# Patient Record
Sex: FEMALE | Race: WHITE | Employment: FULL TIME | URBAN - METROPOLITAN AREA
[De-identification: names, ages, dates, MRNs, and addresses within clinical notes are randomized per-mention and may not be internally consistent; named-entity substitution may affect disease eponyms.]

---

## 2021-08-12 ENCOUNTER — HOSPITAL ENCOUNTER (INPATIENT)
Age: 61
LOS: 9 days | DRG: 870 | End: 2021-08-25
Attending: EMERGENCY MEDICINE | Admitting: INTERNAL MEDICINE
Payer: COMMERCIAL

## 2021-08-12 DIAGNOSIS — R74.01 ELEVATED TRANSAMINASE LEVEL: ICD-10-CM

## 2021-08-12 DIAGNOSIS — R41.82 ALTERED MENTAL STATUS, UNSPECIFIED ALTERED MENTAL STATUS TYPE: ICD-10-CM

## 2021-08-12 DIAGNOSIS — U07.1 2019 NOVEL CORONAVIRUS-INFECTED PNEUMONIA (NCIP): Primary | ICD-10-CM

## 2021-08-12 DIAGNOSIS — R82.4 KETONURIA: ICD-10-CM

## 2021-08-12 DIAGNOSIS — R10.12 LEFT UPPER QUADRANT ABDOMINAL PAIN: ICD-10-CM

## 2021-08-12 DIAGNOSIS — R00.0 TACHYCARDIA: ICD-10-CM

## 2021-08-12 DIAGNOSIS — J12.82 2019 NOVEL CORONAVIRUS-INFECTED PNEUMONIA (NCIP): Primary | ICD-10-CM

## 2021-08-12 DIAGNOSIS — R11.2 NAUSEA AND VOMITING, INTRACTABILITY OF VOMITING NOT SPECIFIED, UNSPECIFIED VOMITING TYPE: ICD-10-CM

## 2021-08-12 DIAGNOSIS — E86.0 DEHYDRATION: ICD-10-CM

## 2021-08-12 DIAGNOSIS — K76.0 HEPATIC STEATOSIS: ICD-10-CM

## 2021-08-12 LAB
A/G RATIO: 1.2 (ref 1.1–2.2)
ALBUMIN SERPL-MCNC: 4.2 G/DL (ref 3.4–5)
ALP BLD-CCNC: 98 U/L (ref 40–129)
ALT SERPL-CCNC: 73 U/L (ref 10–40)
AMORPHOUS: ABNORMAL /HPF
ANION GAP SERPL CALCULATED.3IONS-SCNC: 16 MMOL/L (ref 3–16)
AST SERPL-CCNC: 90 U/L (ref 15–37)
BACTERIA: ABNORMAL /HPF
BASOPHILS ABSOLUTE: 0 K/UL (ref 0–0.2)
BASOPHILS RELATIVE PERCENT: 0.7 %
BILIRUB SERPL-MCNC: 0.3 MG/DL (ref 0–1)
BILIRUBIN URINE: ABNORMAL
BLOOD, URINE: ABNORMAL
BUN BLDV-MCNC: 12 MG/DL (ref 7–20)
CALCIUM SERPL-MCNC: 8.5 MG/DL (ref 8.3–10.6)
CHLORIDE BLD-SCNC: 98 MMOL/L (ref 99–110)
CLARITY: ABNORMAL
CO2: 21 MMOL/L (ref 21–32)
COARSE CASTS, UA: ABNORMAL /LPF (ref 0–2)
COLOR: YELLOW
CREAT SERPL-MCNC: 1.1 MG/DL (ref 0.6–1.2)
EOSINOPHILS ABSOLUTE: 0 K/UL (ref 0–0.6)
EOSINOPHILS RELATIVE PERCENT: 0 %
EPITHELIAL CELLS, UA: ABNORMAL /HPF (ref 0–5)
FINE CASTS, UA: ABNORMAL /LPF (ref 0–2)
GFR AFRICAN AMERICAN: >60
GFR NON-AFRICAN AMERICAN: 51
GLOBULIN: 3.6 G/DL
GLUCOSE BLD-MCNC: 124 MG/DL (ref 70–99)
GLUCOSE URINE: NEGATIVE MG/DL
HCT VFR BLD CALC: 41.4 % (ref 36–48)
HEMOGLOBIN: 14.2 G/DL (ref 12–16)
KETONES, URINE: 40 MG/DL
LEUKOCYTE ESTERASE, URINE: NEGATIVE
LYMPHOCYTES ABSOLUTE: 0.6 K/UL (ref 1–5.1)
LYMPHOCYTES RELATIVE PERCENT: 24.5 %
MCH RBC QN AUTO: 31.6 PG (ref 26–34)
MCHC RBC AUTO-ENTMCNC: 34.3 G/DL (ref 31–36)
MCV RBC AUTO: 92.1 FL (ref 80–100)
MICROSCOPIC EXAMINATION: YES
MONOCYTES ABSOLUTE: 0.2 K/UL (ref 0–1.3)
MONOCYTES RELATIVE PERCENT: 7.1 %
NEUTROPHILS ABSOLUTE: 1.6 K/UL (ref 1.7–7.7)
NEUTROPHILS RELATIVE PERCENT: 67.7 %
NITRITE, URINE: NEGATIVE
PDW BLD-RTO: 13.6 % (ref 12.4–15.4)
PH UA: 5 (ref 5–8)
PLATELET # BLD: 157 K/UL (ref 135–450)
PMV BLD AUTO: 8.1 FL (ref 5–10.5)
POTASSIUM SERPL-SCNC: 3.8 MMOL/L (ref 3.5–5.1)
PROTEIN UA: 100 MG/DL
RBC # BLD: 4.5 M/UL (ref 4–5.2)
RBC UA: ABNORMAL /HPF (ref 0–4)
SODIUM BLD-SCNC: 135 MMOL/L (ref 136–145)
SPECIFIC GRAVITY UA: >=1.03 (ref 1–1.03)
TOTAL PROTEIN: 7.8 G/DL (ref 6.4–8.2)
URINE REFLEX TO CULTURE: YES
URINE TYPE: ABNORMAL
UROBILINOGEN, URINE: 0.2 E.U./DL
WBC # BLD: 2.4 K/UL (ref 4–11)
WBC UA: ABNORMAL /HPF (ref 0–5)

## 2021-08-12 PROCEDURE — 96361 HYDRATE IV INFUSION ADD-ON: CPT

## 2021-08-12 PROCEDURE — 80053 COMPREHEN METABOLIC PANEL: CPT

## 2021-08-12 PROCEDURE — 85025 COMPLETE CBC W/AUTO DIFF WBC: CPT

## 2021-08-12 PROCEDURE — 96374 THER/PROPH/DIAG INJ IV PUSH: CPT

## 2021-08-12 PROCEDURE — 87086 URINE CULTURE/COLONY COUNT: CPT

## 2021-08-12 PROCEDURE — 81001 URINALYSIS AUTO W/SCOPE: CPT

## 2021-08-12 PROCEDURE — 99284 EMERGENCY DEPT VISIT MOD MDM: CPT

## 2021-08-12 ASSESSMENT — PAIN DESCRIPTION - ONSET: ONSET: ON-GOING

## 2021-08-12 ASSESSMENT — PAIN DESCRIPTION - LOCATION: LOCATION: GENERALIZED

## 2021-08-12 ASSESSMENT — PAIN DESCRIPTION - DESCRIPTORS: DESCRIPTORS: ACHING

## 2021-08-12 ASSESSMENT — PAIN SCALES - GENERAL: PAINLEVEL_OUTOF10: 6

## 2021-08-12 ASSESSMENT — PAIN DESCRIPTION - FREQUENCY: FREQUENCY: INTERMITTENT

## 2021-08-12 ASSESSMENT — PAIN DESCRIPTION - PAIN TYPE: TYPE: ACUTE PAIN

## 2021-08-13 ENCOUNTER — APPOINTMENT (OUTPATIENT)
Dept: GENERAL RADIOLOGY | Age: 61
DRG: 870 | End: 2021-08-13

## 2021-08-13 ENCOUNTER — APPOINTMENT (OUTPATIENT)
Dept: CT IMAGING | Age: 61
DRG: 870 | End: 2021-08-13

## 2021-08-13 PROBLEM — U07.1 COVID-19: Status: ACTIVE | Noted: 2021-08-13

## 2021-08-13 LAB
EKG ATRIAL RATE: 119 BPM
EKG DIAGNOSIS: NORMAL
EKG P AXIS: 50 DEGREES
EKG P-R INTERVAL: 156 MS
EKG Q-T INTERVAL: 318 MS
EKG QRS DURATION: 62 MS
EKG QTC CALCULATION (BAZETT): 447 MS
EKG R AXIS: 6 DEGREES
EKG T AXIS: 30 DEGREES
EKG VENTRICULAR RATE: 119 BPM
LACTIC ACID, SEPSIS: 1.6 MMOL/L (ref 0.4–1.9)
SARS-COV-2, NAAT: DETECTED
TROPONIN: <0.01 NG/ML
URINE CULTURE, ROUTINE: NORMAL

## 2021-08-13 PROCEDURE — 96376 TX/PRO/DX INJ SAME DRUG ADON: CPT

## 2021-08-13 PROCEDURE — 96372 THER/PROPH/DIAG INJ SC/IM: CPT

## 2021-08-13 PROCEDURE — 99222 1ST HOSP IP/OBS MODERATE 55: CPT | Performed by: INTERNAL MEDICINE

## 2021-08-13 PROCEDURE — 2580000003 HC RX 258: Performed by: INTERNAL MEDICINE

## 2021-08-13 PROCEDURE — 87449 NOS EACH ORGANISM AG IA: CPT

## 2021-08-13 PROCEDURE — 96375 TX/PRO/DX INJ NEW DRUG ADDON: CPT

## 2021-08-13 PROCEDURE — 94761 N-INVAS EAR/PLS OXIMETRY MLT: CPT

## 2021-08-13 PROCEDURE — 6360000002 HC RX W HCPCS: Performed by: NURSE PRACTITIONER

## 2021-08-13 PROCEDURE — 2700000000 HC OXYGEN THERAPY PER DAY

## 2021-08-13 PROCEDURE — 96361 HYDRATE IV INFUSION ADD-ON: CPT

## 2021-08-13 PROCEDURE — 93010 ELECTROCARDIOGRAM REPORT: CPT | Performed by: INTERNAL MEDICINE

## 2021-08-13 PROCEDURE — 93005 ELECTROCARDIOGRAM TRACING: CPT | Performed by: NURSE PRACTITIONER

## 2021-08-13 PROCEDURE — 70450 CT HEAD/BRAIN W/O DYE: CPT

## 2021-08-13 PROCEDURE — 6370000000 HC RX 637 (ALT 250 FOR IP): Performed by: INTERNAL MEDICINE

## 2021-08-13 PROCEDURE — 87635 SARS-COV-2 COVID-19 AMP PRB: CPT

## 2021-08-13 PROCEDURE — G0378 HOSPITAL OBSERVATION PER HR: HCPCS

## 2021-08-13 PROCEDURE — 6360000002 HC RX W HCPCS: Performed by: INTERNAL MEDICINE

## 2021-08-13 PROCEDURE — 74177 CT ABD & PELVIS W/CONTRAST: CPT

## 2021-08-13 PROCEDURE — 2580000003 HC RX 258: Performed by: NURSE PRACTITIONER

## 2021-08-13 PROCEDURE — 84484 ASSAY OF TROPONIN QUANT: CPT

## 2021-08-13 PROCEDURE — 83605 ASSAY OF LACTIC ACID: CPT

## 2021-08-13 PROCEDURE — 96374 THER/PROPH/DIAG INJ IV PUSH: CPT

## 2021-08-13 PROCEDURE — 87324 CLOSTRIDIUM AG IA: CPT

## 2021-08-13 PROCEDURE — 71045 X-RAY EXAM CHEST 1 VIEW: CPT

## 2021-08-13 PROCEDURE — 6360000004 HC RX CONTRAST MEDICATION: Performed by: NURSE PRACTITIONER

## 2021-08-13 RX ORDER — BENZONATATE 100 MG/1
200 CAPSULE ORAL 2 TIMES DAILY PRN
COMMUNITY

## 2021-08-13 RX ORDER — 0.9 % SODIUM CHLORIDE 0.9 %
1000 INTRAVENOUS SOLUTION INTRAVENOUS ONCE
Status: COMPLETED | OUTPATIENT
Start: 2021-08-13 | End: 2021-08-13

## 2021-08-13 RX ORDER — ACETAMINOPHEN 325 MG/1
650 TABLET ORAL EVERY 6 HOURS PRN
Status: DISCONTINUED | OUTPATIENT
Start: 2021-08-13 | End: 2021-08-25 | Stop reason: HOSPADM

## 2021-08-13 RX ORDER — ONDANSETRON 4 MG/1
4 TABLET, ORALLY DISINTEGRATING ORAL EVERY 8 HOURS PRN
Status: DISCONTINUED | OUTPATIENT
Start: 2021-08-13 | End: 2021-08-25 | Stop reason: HOSPADM

## 2021-08-13 RX ORDER — MORPHINE SULFATE 4 MG/ML
4 INJECTION, SOLUTION INTRAMUSCULAR; INTRAVENOUS EVERY 30 MIN PRN
Status: DISCONTINUED | OUTPATIENT
Start: 2021-08-13 | End: 2021-08-13 | Stop reason: HOSPADM

## 2021-08-13 RX ORDER — POLYETHYLENE GLYCOL 3350 17 G/17G
17 POWDER, FOR SOLUTION ORAL DAILY PRN
Status: DISCONTINUED | OUTPATIENT
Start: 2021-08-13 | End: 2021-08-25 | Stop reason: HOSPADM

## 2021-08-13 RX ORDER — SODIUM CHLORIDE 0.9 % (FLUSH) 0.9 %
5-40 SYRINGE (ML) INJECTION EVERY 12 HOURS SCHEDULED
Status: DISCONTINUED | OUTPATIENT
Start: 2021-08-13 | End: 2021-08-25

## 2021-08-13 RX ORDER — ACETAMINOPHEN 325 MG/1
650 TABLET ORAL EVERY 6 HOURS PRN
Status: DISCONTINUED | OUTPATIENT
Start: 2021-08-13 | End: 2021-08-17 | Stop reason: SDUPTHER

## 2021-08-13 RX ORDER — MONTELUKAST SODIUM 10 MG/1
10 TABLET ORAL NIGHTLY
Status: DISCONTINUED | OUTPATIENT
Start: 2021-08-13 | End: 2021-08-24

## 2021-08-13 RX ORDER — PAROXETINE HYDROCHLORIDE 20 MG/1
40 TABLET, FILM COATED ORAL DAILY
Status: DISCONTINUED | OUTPATIENT
Start: 2021-08-13 | End: 2021-08-25

## 2021-08-13 RX ORDER — SODIUM CHLORIDE 0.9 % (FLUSH) 0.9 %
5-40 SYRINGE (ML) INJECTION PRN
Status: DISCONTINUED | OUTPATIENT
Start: 2021-08-13 | End: 2021-08-25 | Stop reason: HOSPADM

## 2021-08-13 RX ORDER — ACETAMINOPHEN 650 MG/1
650 SUPPOSITORY RECTAL EVERY 6 HOURS PRN
Status: DISCONTINUED | OUTPATIENT
Start: 2021-08-13 | End: 2021-08-25 | Stop reason: HOSPADM

## 2021-08-13 RX ORDER — SODIUM CHLORIDE 9 MG/ML
25 INJECTION, SOLUTION INTRAVENOUS PRN
Status: DISCONTINUED | OUTPATIENT
Start: 2021-08-13 | End: 2021-08-25

## 2021-08-13 RX ORDER — SODIUM CHLORIDE 9 MG/ML
INJECTION, SOLUTION INTRAVENOUS CONTINUOUS
Status: DISCONTINUED | OUTPATIENT
Start: 2021-08-13 | End: 2021-08-14

## 2021-08-13 RX ORDER — ZOLPIDEM TARTRATE 5 MG/1
10 TABLET ORAL NIGHTLY PRN
Status: DISCONTINUED | OUTPATIENT
Start: 2021-08-13 | End: 2021-08-24

## 2021-08-13 RX ORDER — VITAMIN B COMPLEX
2000 TABLET ORAL DAILY
Status: DISCONTINUED | OUTPATIENT
Start: 2021-08-14 | End: 2021-08-25

## 2021-08-13 RX ORDER — ACETAMINOPHEN 650 MG/1
650 SUPPOSITORY RECTAL EVERY 6 HOURS PRN
Status: DISCONTINUED | OUTPATIENT
Start: 2021-08-13 | End: 2021-08-17 | Stop reason: SDUPTHER

## 2021-08-13 RX ORDER — ONDANSETRON 2 MG/ML
4 INJECTION INTRAMUSCULAR; INTRAVENOUS EVERY 30 MIN PRN
Status: DISCONTINUED | OUTPATIENT
Start: 2021-08-13 | End: 2021-08-13

## 2021-08-13 RX ORDER — PANTOPRAZOLE SODIUM 40 MG/1
40 TABLET, DELAYED RELEASE ORAL
Status: DISCONTINUED | OUTPATIENT
Start: 2021-08-13 | End: 2021-08-17

## 2021-08-13 RX ORDER — ONDANSETRON 2 MG/ML
INJECTION INTRAMUSCULAR; INTRAVENOUS
Status: DISPENSED
Start: 2021-08-13 | End: 2021-08-13

## 2021-08-13 RX ORDER — METHYLPREDNISOLONE SODIUM SUCCINATE 40 MG/ML
40 INJECTION, POWDER, LYOPHILIZED, FOR SOLUTION INTRAMUSCULAR; INTRAVENOUS EVERY 12 HOURS
Status: COMPLETED | OUTPATIENT
Start: 2021-08-13 | End: 2021-08-22

## 2021-08-13 RX ORDER — ONDANSETRON 2 MG/ML
4 INJECTION INTRAMUSCULAR; INTRAVENOUS EVERY 6 HOURS PRN
Status: DISCONTINUED | OUTPATIENT
Start: 2021-08-13 | End: 2021-08-25 | Stop reason: HOSPADM

## 2021-08-13 RX ADMIN — ACETAMINOPHEN 650 MG: 325 TABLET ORAL at 09:05

## 2021-08-13 RX ADMIN — ENOXAPARIN SODIUM 30 MG: 30 INJECTION SUBCUTANEOUS at 17:18

## 2021-08-13 RX ADMIN — ENOXAPARIN SODIUM 40 MG: 40 INJECTION SUBCUTANEOUS at 09:05

## 2021-08-13 RX ADMIN — SODIUM CHLORIDE: 9 INJECTION, SOLUTION INTRAVENOUS at 14:49

## 2021-08-13 RX ADMIN — SODIUM CHLORIDE: 9 INJECTION, SOLUTION INTRAVENOUS at 06:50

## 2021-08-13 RX ADMIN — IOPAMIDOL 75 ML: 755 INJECTION, SOLUTION INTRAVENOUS at 01:41

## 2021-08-13 RX ADMIN — SODIUM CHLORIDE, PRESERVATIVE FREE 10 ML: 5 INJECTION INTRAVENOUS at 21:59

## 2021-08-13 RX ADMIN — SODIUM CHLORIDE 1000 ML: 9 INJECTION, SOLUTION INTRAVENOUS at 00:57

## 2021-08-13 RX ADMIN — METHYLPREDNISOLONE SODIUM SUCCINATE 40 MG: 40 INJECTION, POWDER, FOR SOLUTION INTRAMUSCULAR; INTRAVENOUS at 21:58

## 2021-08-13 RX ADMIN — ONDANSETRON 4 MG: 4 TABLET, ORALLY DISINTEGRATING ORAL at 14:38

## 2021-08-13 RX ADMIN — ENOXAPARIN SODIUM 30 MG: 30 INJECTION SUBCUTANEOUS at 21:59

## 2021-08-13 RX ADMIN — METHYLPREDNISOLONE SODIUM SUCCINATE 40 MG: 40 INJECTION, POWDER, FOR SOLUTION INTRAMUSCULAR; INTRAVENOUS at 10:35

## 2021-08-13 RX ADMIN — PAROXETINE HYDROCHLORIDE 40 MG: 20 TABLET, FILM COATED ORAL at 09:06

## 2021-08-13 RX ADMIN — MORPHINE SULFATE 4 MG: 4 INJECTION, SOLUTION INTRAMUSCULAR; INTRAVENOUS at 00:56

## 2021-08-13 RX ADMIN — PANTOPRAZOLE SODIUM 40 MG: 40 TABLET, DELAYED RELEASE ORAL at 06:55

## 2021-08-13 RX ADMIN — MONTELUKAST SODIUM 10 MG: 10 TABLET ORAL at 21:59

## 2021-08-13 RX ADMIN — SODIUM CHLORIDE, PRESERVATIVE FREE 10 ML: 5 INJECTION INTRAVENOUS at 09:09

## 2021-08-13 ASSESSMENT — PAIN SCALES - GENERAL
PAINLEVEL_OUTOF10: 0
PAINLEVEL_OUTOF10: 3
PAINLEVEL_OUTOF10: 0

## 2021-08-13 NOTE — ED PROVIDER NOTES
I independently performed a history and physical on Debbi Aiken. All diagnostic, treatment, and disposition decisions were made by myself in conjunction with the advanced practice provider. For further details of Claudene Kung emergency department encounter, please see the JAMES/resident's documentation. Briefly, this is a 61-year-old female with history of anxiety, depression who presents emergency department for evaluation of altered mental status. Patient was recently diagnosed with coronavirus at an urgent care. She has had nausea, vomiting. According to family she has had altered mental status. On exam, patient is tachycardic to the 110s. She is afebrile. SPO2 100% on room air. She is alert and slowly answers questions. She has no focal logical deficits. She does have 10-20 white blood cells on urinalysis however this appears contaminated. CT of the head was unremarkable. Because of continued altered mental status is likely secondary to COVID-19, she will require admission. She has no exam findings that are concerning for meningitis, no meningismus, full range of motion of the neck and she is afebrile here. EKG  The Ekg interpreted by myself in the emergency department in the absence of a cardiologist.  sinus tachycardia, pxpi=116 with a rate of 119  Axis is   Normal  QTc is  within an acceptable range  Intervals and Durations are unremarkable. No specific ST-T wave changes appreciated. Nonspecific ST flattening in lead I, aVL  No evidence of acute ischemia.    No significant change from prior EKG dated 4/16/13       Sai Abdi MD  08/13/21 5429       Sai Abdi MD  08/13/21 8811

## 2021-08-13 NOTE — PROGRESS NOTES
4 Eyes Skin Assessment     The patient is being assess for  Admission    I agree that 2 RN's have performed a thorough Head to Toe Skin Assessment on the patient. ALL assessment sites listed below have been assessed. Areas assessed by both nurses:   [x]   Head, Face, and Ears   [x]   Shoulders, Back, and Chest  [x]   Arms, Elbows, and Hands   [x]   Coccyx, Sacrum, and IschIum  [x]   Legs, Feet, and Heels        Does the Patient have Skin Breakdown?   No         Ravindra Prevention initiated:  No   Wound Care Orders initiated:  No      [facility] nurse consulted for Pressure Injury (Stage 3,4, Unstageable, DTI, NWPT, and Complex wounds), New and Established Ostomies:  No      Nurse 1 eSignature: Electronically signed by Marzena Crowder RN on 8/13/21 at 4:10 PM EDT    **SHARE this note so that the co-signing nurse is able to place an eSignature**    Nurse 2 eSignature: {Esignature:[id number]}

## 2021-08-13 NOTE — PROGRESS NOTES
Pt admitted from ED to R 331. A&Ox4. VSS- RA. Pt oriented to room and asked to call for assistance. Verbalizes understanding. Admission completed. Pt started on fluids. Denies pain or other needs. Call light within reach, bed in lowest position, wheels locked. Bed alarm on and audible.

## 2021-08-13 NOTE — H&P
MOUTH EVERY NIGHT AT BEDTIME FOR ALLERGIES 10/31/13   Dianelys Newton MD   omeprazole (PRILOSEC) 40 MG capsule TAKE ONE CAPSULE BY MOUTH EVERY DAY FOR REFLUX 10/10/13   Dianelys Newton MD       Allergies:  Erythromycin and Sulphur [colloidal sulfur]    Social History:      The patient currently lives at home    TOBACCO:   reports that she has quit smoking. She has never used smokeless tobacco.  ETOH:   reports no history of alcohol use. E-Cigarettes/Vaping Use     Questions Responses    E-Cigarette/Vaping Use Never User    Start Date     Passive Exposure     Quit Date     Counseling Given     Comments             Family History:       Reviewed in detail and negative for DM, CAD, Cancer, CVA. Positive as follows:        Problem Relation Age of Onset    Anxiety Disorder Mother     Diabetes Mother     High Blood Pressure Father     Heart Failure Father     Heart Surgery Father     Heart Attack Father     Anxiety Disorder Sister     Diabetes Sister     High Blood Pressure Brother     Stroke Maternal Grandmother        REVIEW OF SYSTEMS COMPLETED:   Pertinent positives as noted in the HPI. All other systems reviewed and negative. PHYSICAL EXAM PERFORMED:    BP (!) 156/81   Pulse 113   Temp 98.6 °F (37 °C) (Oral)   Resp 27   Ht 5' (1.524 m)   Wt 185 lb (83.9 kg)   LMP 04/15/2012   SpO2 91%   BMI 36.13 kg/m²     General appearance:  mild distress/malaise noted, appears stated age and cooperative. Lethargic but awake  HEENT:  Normal cephalic, atraumatic without obvious deformity. Pupils equal, round, and reactive to light. Extra ocular muscles intact. Conjunctivae/corneas clear. Neck: Supple, with full range of motion. No jugular venous distention. Trachea midline. Respiratory:  inc'd respiratory effort. Clear to auscultation, bilaterally without Wheezes/Rhonchi. Mild bibas rales noted  Cardiovascular:  Regular rate and rhythm with normal S1/S2 without murmurs, rubs or gallops.   Abdomen: Soft, non-tender, non-distended with normal bowel sounds. Musculoskeletal:  No clubbing, cyanosis or edema bilaterally. Full range of motion without deformity. Skin: Skin color, texture, turgor normal.  No rashes or lesions. Neurologic:  Neurovascularly intact without any focal sensory/motor deficits. Cranial nerves: II-XII intact, grossly non-focal.  Psychiatric:  Alert and oriented, thought content appropriate, normal insight  Capillary Refill: Brisk,3 seconds, normal  Peripheral Pulses: +2 palpable, equal bilaterally       Labs:     Recent Labs     08/12/21 1910   WBC 2.4*   HGB 14.2   HCT 41.4        Recent Labs     08/12/21 1910   *   K 3.8   CL 98*   CO2 21   BUN 12   CREATININE 1.1   CALCIUM 8.5     Recent Labs     08/12/21 1910   AST 90*   ALT 73*   BILITOT 0.3   ALKPHOS 98     No results for input(s): INR in the last 72 hours. Recent Labs     08/13/21  0100   TROPONINI <0.01       Urinalysis:      Lab Results   Component Value Date    NITRU Negative 08/12/2021    WBCUA 10-20 08/12/2021    BACTERIA 1+ 08/12/2021    RBCUA 3-4 08/12/2021    BLOODU TRACE-INTACT 08/12/2021    SPECGRAV >=1.030 08/12/2021    GLUCOSEU Negative 08/12/2021       Radiology:     EKG:  I have reviewed the EKG with the following interpretation: sinus tach, 119, nl axis, no gross ischemic changes noted    CT Head WO Contrast   Final Result   No acute intracranial abnormality. CT ABDOMEN PELVIS W IV CONTRAST Additional Contrast? None   Final Result   1. Mucosal thickening and enhancement of the terminal ileum, proximal colon   and sigmoid colon/rectum. Pattern is nonspecific and suggests underlying   enterocolitis. Infectious or inflammatory etiologies may be considered. 2. No pattern of bowel obstruction. 3. Hepatic steatosis. 4. Airspace changes in the lower chest likely represent underlying viral   pneumonia.          XR CHEST PORTABLE   Final Result   Perihilar opacities could represent COVID pneumonia given indication. Correlate with presentation to exclude superimposed congestive heart failure. ASSESSMENT:    Active Hospital Problems    Diagnosis Date Noted    COVID-19 [U07.1] 08/13/2021         PLAN:    Sepsis- due to covid, with tachypnea/tachycardia and +rapid test, pt was recently diagnosed on 8/11  -supportive care  -ivfs given  -lactate was wnl  -no abx given viral etiology    Acute encephalopathy- seems improved with ivfs, due to ?covid, CThead no acute abn  -tele  -neurochecks    Acute resp failure- with sats 88% ra in ER per staff, likely from covid  -iv solumedrol q12h ordered  -supportive care  -tele  -ID consulted, ?need for antivirals or mab. N/v/abd pain/diarrhea- likely from covid, CTa/p done(suggestive of enterocolitis, no obstruction, noted hepatiac steatosis)  -continue supportive care    Asthma- on home singulair    Anxiety-continued paxil      DVT Prophylaxis: lovenox bid  Diet: ADULT DIET; Regular  Code Status: Full Code    PT/OT Eval Status: not ordered    Dispo - pending improvement       Toribio Clement MD    Thank you No primary care provider on file. for the opportunity to be involved in this patient's care. If you have any questions or concerns please feel free to contact me at [phone].

## 2021-08-13 NOTE — CONSULTS
Infectious Diseases   Consult Note      Reason for Consult:  COVID    Requesting Physician:  Dr. Alexander Marroquin      Date of Admission: 8/12/2021  Subjective:   CHIEF COMPLAINT:   None given       HPI:    Kaveh Barrett is a 57yoF with history of obesity, asthma, anxiety. Not vaccinated for COVI D  She developed flu-like symptoms ~8/7/21  Had +COVID test at urgent care this week. +loose stools, cough, fever, encephalopathy prompting ED evaluation today. WBC low at 2.4,   LFTs elevated   CT head negative   CT abd pelvis with mucosal thickening of terminal ileum, prox colon and sigmoid suggesting underlying enterocolitis. Unclear if she has ever had endoscopic evaluation. CXR with perihilar opacities. She was hypoxemic and is admitted for evaluation. SO at home also has COVID    Currently 92% on RA at rest.                    Current abx:  None        Past Surgical History:       Diagnosis Date    Anxiety     Asthma     COVID-19 08/11/2021    Haemorrhoids in the puerperium, delivered, with mention of postpartum complication     Iron deficiency     Menorrhagia          Procedure Laterality Date    BREAST REDUCTION SURGERY      LAPAROSCOPY      TONSILLECTOMY      ULNA OSTEOTOMY         Social History:    TOBACCO:   reports that she has quit smoking. She has never used smokeless tobacco.  ETOH:   reports no history of alcohol use. There is no history of illicit drug use or other significant epidemiologic exposures.       Family History:       Problem Relation Age of Onset    Anxiety Disorder Mother     Diabetes Mother     High Blood Pressure Father     Heart Failure Father     Heart Surgery Father     Heart Attack Father     Anxiety Disorder Sister     Diabetes Sister     High Blood Pressure Brother     Stroke Maternal Grandmother        Current Medications:    Current Facility-Administered Medications: montelukast (SINGULAIR) tablet 10 mg, 10 mg, Oral, Nightly  pantoprazole (PROTONIX) tablet 40 mg, 40 mg, Oral, QAM AC  PARoxetine (PAXIL) tablet 40 mg, 40 mg, Oral, Daily  zolpidem (AMBIEN) tablet 10 mg, 10 mg, Oral, Nightly PRN  sodium chloride flush 0.9 % injection 5-40 mL, 5-40 mL, Intravenous, 2 times per day  sodium chloride flush 0.9 % injection 5-40 mL, 5-40 mL, Intravenous, PRN  0.9 % sodium chloride infusion, 25 mL, Intravenous, PRN  ondansetron (ZOFRAN-ODT) disintegrating tablet 4 mg, 4 mg, Oral, Q8H PRN **OR** ondansetron (ZOFRAN) injection 4 mg, 4 mg, Intravenous, Q6H PRN  polyethylene glycol (GLYCOLAX) packet 17 g, 17 g, Oral, Daily PRN  acetaminophen (TYLENOL) tablet 650 mg, 650 mg, Oral, Q6H PRN **OR** acetaminophen (TYLENOL) suppository 650 mg, 650 mg, Rectal, Q6H PRN  0.9 % sodium chloride infusion, , Intravenous, Continuous  acetaminophen (TYLENOL) tablet 650 mg, 650 mg, Oral, Q6H PRN **OR** acetaminophen (TYLENOL) suppository 650 mg, 650 mg, Rectal, Q6H PRN  methylPREDNISolone sodium (SOLU-MEDROL) injection 40 mg, 40 mg, Intravenous, Q12H  [START ON 8/14/2021] Vitamin D (CHOLECALCIFEROL) tablet 2,000 Units, 2,000 Units, Oral, Daily  enoxaparin (LOVENOX) injection 30 mg, 30 mg, Subcutaneous, BID      Allergies   Allergen Reactions    Erythromycin     Sulphur [Colloidal Sulfur]         REVIEW OF SYSTEMS:    CONSTITUTIONAL:   Per HPI   EYES:  negative for blurred vision, eye discharge, visual disturbance and icterus  HEENT:  negative for hearing loss, tinnitus, ear drainage, sinus pressure, nasal congestion, epistaxis and snoring  RESPIRATORY:   Per HPI   CARDIOVASCULAR:  negative for chest pain, palpitations, exertional chest pressure/discomfort, edema, syncope  GASTROINTESTINAL:    Had 1 non-bloody loose BM today   GENITOURINARY:  negative for frequency, dysuria, urinary incontinence, decreased urine volume, and hematuria  HEMATOLOGIC/LYMPHATIC:  negative for easy bruising, bleeding and lymphadenopathy  ALLERGIC/IMMUNOLOGIC:  negative for recurrent infections, angioedema, anaphylaxis and drug reactions  ENDOCRINE:  negative for weight changes and diabetic symptoms including polyuria, polydipsia and polyphagia  MUSCULOSKELETAL:  negative for acute joint swelling, decreased range of motion and muscle weakness  NEUROLOGICAL:  negative for headaches, slurred speech, unilateral weakness  PSYCHIATRIC/BEHAVIORAL: negative for hallucinations, behavioral problems, confusion and agitation. Objective:   PHYSICAL EXAM:      VITALS:  /79   Pulse 109   Temp 97.7 °F (36.5 °C) (Oral)   Resp 18   Ht 5' (1.524 m)   Wt 185 lb (83.9 kg)   LMP 04/15/2012   SpO2 92%   BMI 36.13 kg/m²      24HR INTAKE/OUTPUT:      Intake/Output Summary (Last 24 hours) at 8/13/2021 1505  Last data filed at 8/13/2021 0905  Gross per 24 hour   Intake 1010 ml   Output    Net 1010 ml     CONSTITUTIONAL:  Awake, alert, cooperative, no apparent distress, and appears stated age  [de-identified]: NCAT, PERRL, EOMI. Sclera white, conjunctiva full. OP with moist mucosal membranes, no thrush, tongue protrudes midline  NECK:  Supple, symmetrical, trachea midline, no adenopathy  LUNGS:  no increased work of breathing  ABDOMEN:  normal bowel sounds, soft, NT   PSYCHIATRIC: Oriented to person place and time. No obvious depression or anxiety. MUSCULOSKELETAL: No obvious misalignment or effusion of the joints. No clubbing, cyanosis of the digits. SKIN:  normal skin color, texture, turgor and no redness, warmth, or swelling.  No palpable nodules or stigmata of embolic phenomenon  NEUROLOGIC: nonfocal exam  ACCESS:   IV site ok       DATA:    Old records have been reviewed    CBC:  Recent Labs     08/12/21 1910   WBC 2.4*   RBC 4.50   HGB 14.2   HCT 41.4      MCV 92.1   MCH 31.6   MCHC 34.3   RDW 13.6      BMP:  Recent Labs     08/12/21 1910   *   K 3.8   CL 98*   CO2 21   BUN 12   CREATININE 1.1   CALCIUM 8.5   GLUCOSE 124*        Cultures:   8/12 UC NGTD  8/13 COVID NAAT +       Radiology Review: All pertinent images / reports were reviewed as a part of this visit. CT a/p   Impression   1. Mucosal thickening and enhancement of the terminal ileum, proximal colon   and sigmoid colon/rectum.  Pattern is nonspecific and suggests underlying   enterocolitis.  Infectious or inflammatory etiologies may be considered. 2. No pattern of bowel obstruction. 3. Hepatic steatosis. 4. Airspace changes in the lower chest likely represent underlying viral   pneumonia. Assessment:     Patient Active Problem List   Diagnosis    Anxiety and depression    Chest pain    COVID-19       COVID-19   Unvaccinated host  Date of symptom onset 8/7/21  Date of diagnosis and admission 8/13/21 (date of outpt +test unclear)  Severe disease with resting O2 sat 92% on RA   Increased risk of complication due to age, obesity, comorbid conditions   -start steroid   -if condition were to significantly worsen, could consider IL6 or JK inhibitor  -isolation as ordered    CT evidence of possible enterocolitis  -check C diff  -clarify timing of any prior endoscopic evaluation    Discussed with patient, questions addressed         Enoc Hurst M.D. Thank you for the opportunity to participate in the care of your patient.     Please do not hesitate to contact me:   [phone] office

## 2021-08-13 NOTE — ED PROVIDER NOTES
Evaluated by Advanced Practice Provider    EMERGENCY DEPARTMENT ENCOUNTER      CHIEFCOMPLAINT  Nausea & Vomiting (Pt dx'd with Covid yesterday and Urgent Care sent in for N/V.) and Positive For Covid-19    HPI    Michael Das is a 61 y.o. female who presents to the emergency department with complaints of nausea and vomiting. Yesterday had a COVID test and it was positive. She was sent here for nausea and vomiting. Patient can not give me much history, she is confused and loosing track of her thoughts. Starts to tell me something and wanders off. Can not discern when her symptoms started. PAST MEDICAL HISTORY    Past Medical History:   Diagnosis Date    Anxiety     Asthma     COVID-19 08/11/2021    Haemorrhoids in the puerperium, delivered, with mention of postpartum complication     Iron deficiency     Menorrhagia        SURGICAL HISTORY    Past Surgical History:   Procedure Laterality Date    BREAST REDUCTION SURGERY      LAPAROSCOPY      TONSILLECTOMY      ULNA OSTEOTOMY         CURRENT MEDICATIONS    Current Outpatient Rx   Medication Sig Dispense Refill    zolpidem (AMBIEN) 10 MG tablet Take 1 tablet by mouth nightly as needed. 90 tablet 0    piroxicam (FELDENE) 20 MG capsule Take 1 capsule by mouth daily. For pain 90 capsule 0    PARoxetine (PAXIL) 40 MG tablet TAKE ONE TABLET BY MOUTH EVERY MORNING 90 tablet 0    triamcinolone (KENALOG) 0.1 % cream Apply topically 2 times daily. 60 g 3    diazepam (VALIUM) 5 MG tablet Take 1 tablet by mouth nightly as needed for Anxiety.  30 tablet 0    montelukast (SINGULAIR) 10 MG tablet TAKE ONE TABLET BY MOUTH EVERY NIGHT AT BEDTIME FOR ALLERGIES 30 tablet 5    omeprazole (PRILOSEC) 40 MG capsule TAKE ONE CAPSULE BY MOUTH EVERY DAY FOR REFLUX 30 capsule 5       ALLERGIES    Allergies   Allergen Reactions    Erythromycin     Sulphur [Colloidal Sulfur]        FAMILY HISTORY    Family History   Problem Relation Age of Onset    Anxiety Disorder Mother     Diabetes Mother     High Blood Pressure Father     Heart Failure Father     Heart Surgery Father     Heart Attack Father     Anxiety Disorder Sister     Diabetes Sister     High Blood Pressure Brother     Stroke Maternal Grandmother        SOCIAL HISTORY    Social History     Socioeconomic History    Marital status: Single     Spouse name: None    Number of children: None    Years of education: None    Highest education level: None   Occupational History    None   Tobacco Use    Smoking status: Former Smoker    Smokeless tobacco: Never Used   Vaping Use    Vaping Use: Never used   Substance and Sexual Activity    Alcohol use: No    Drug use: No    Sexual activity: Yes     Partners: Male   Other Topics Concern    None   Social History Narrative    None     Social Determinants of Health     Financial Resource Strain:     Difficulty of Paying Living Expenses:    Food Insecurity:     Worried About Running Out of Food in the Last Year:     Ran Out of Food in the Last Year:    Transportation Needs:     Lack of Transportation (Medical):      Lack of Transportation (Non-Medical):    Physical Activity:     Days of Exercise per Week:     Minutes of Exercise per Session:    Stress:     Feeling of Stress :    Social Connections:     Frequency of Communication with Friends and Family:     Frequency of Social Gatherings with Friends and Family:     Attends Synagogue Services:     Active Member of Clubs or Organizations:     Attends Club or Organization Meetings:     Marital Status:    Intimate Partner Violence:     Fear of Current or Ex-Partner:     Emotionally Abused:     Physically Abused:     Sexually Abused:        REVIEW OF SYSTEMS    10 systems reviewed, pertinent positives per HPI otherwise noted to be negative    PHYSICAL EXAM  Physical Exam  Vitals:    08/13/21 0310   BP: (!) 140/73   Pulse: 119   Resp: 17   Temp:    SpO2: 96%       GENERAL: Patient is well-developed, obese. Awake and alert. Cooperative. Resting in bed. Confused, trailing off with her thoughts. HEENT:  Normocephalic, atraumatic. Conjunctiva appear normal. Sclera is non-icteric. External ears are normal.    NECK: Supple with normal ROM. Trachea midline  LUNGS: Equal and symmetric chest rise. Breathing is unlabored. Speaking comfortably in full sentences. Lungs are clear bilaterally to auscultation. Without wheezing, rales, or rhonchi. CADIOVASCULAR: Tachycardic rate and rhythm. Normal S1-S2 sounds. No murmurs, rubs, or gallops. Capillary refill is brisk in all 4extremities. Bilateral lower extremities are equal in size, there is no swelling observed. There is no tenderness to palpation. There is no erythema observed or warmth palpated. GI: Soft, nontender, nondistended with positive bowelsounds. No rebound tenderness, guarding or any peritoneal signs. No masses or hepatosplenomegaly. When she leaned forward she had sudden onset of LUQ abdominal pain. MUSCULOSKELETAL:  No gross deformities or trauma noted. Moving allextremities equally and appropriately. Normal ROM. SKIN: Warm/dry. Skin is intact. Norashes/lesions noted. PSYCHIATRIC: Mood and affect appropriate. Speech is clear andarticulate. NEUROLOGIC: Alert and oriented to person, place and time. Can not complete a thought/sentence when she starts, not answering questions as she trails off. Following commands. No focal motor or sensory deficits. LABS  I havereviewed all labs for this visit.    Results for orders placed or performed during the hospital encounter of 08/12/21   COVID-19, Rapid    Specimen: Nasopharyngeal Swab; Nasal   Result Value Ref Range    SARS-CoV-2, NAAT DETECTED (AA) Not Detected   CBC Auto Differential   Result Value Ref Range    WBC 2.4 (L) 4.0 - 11.0 K/uL    RBC 4.50 4.00 - 5.20 M/uL    Hemoglobin 14.2 12.0 - 16.0 g/dL    Hematocrit 41.4 36.0 - 48.0 %    MCV 92.1 80.0 - 100.0 fL    MCH 31.6 26.0 - 34.0 pg    MCHC 34.3 31.0 - 36.0 g/dL    RDW 13.6 12.4 - 15.4 %    Platelets 598 489 - 060 K/uL    MPV 8.1 5.0 - 10.5 fL    Neutrophils % 67.7 %    Lymphocytes % 24.5 %    Monocytes % 7.1 %    Eosinophils % 0.0 %    Basophils % 0.7 %    Neutrophils Absolute 1.6 (L) 1.7 - 7.7 K/uL    Lymphocytes Absolute 0.6 (L) 1.0 - 5.1 K/uL    Monocytes Absolute 0.2 0.0 - 1.3 K/uL    Eosinophils Absolute 0.0 0.0 - 0.6 K/uL    Basophils Absolute 0.0 0.0 - 0.2 K/uL   Comprehensive metabolic panel   Result Value Ref Range    Sodium 135 (L) 136 - 145 mmol/L    Potassium 3.8 3.5 - 5.1 mmol/L    Chloride 98 (L) 99 - 110 mmol/L    CO2 21 21 - 32 mmol/L    Anion Gap 16 3 - 16    Glucose 124 (H) 70 - 99 mg/dL    BUN 12 7 - 20 mg/dL    CREATININE 1.1 0.6 - 1.2 mg/dL    GFR Non- 51 (A) >60    GFR African American >60 >60    Calcium 8.5 8.3 - 10.6 mg/dL    Total Protein 7.8 6.4 - 8.2 g/dL    Albumin 4.2 3.4 - 5.0 g/dL    Albumin/Globulin Ratio 1.2 1.1 - 2.2    Total Bilirubin 0.3 0.0 - 1.0 mg/dL    Alkaline Phosphatase 98 40 - 129 U/L    ALT 73 (H) 10 - 40 U/L    AST 90 (H) 15 - 37 U/L    Globulin 3.6 g/dL   Urine, reflex to culture    Specimen: Urine, clean catch   Result Value Ref Range    Color, UA Yellow Straw/Yellow    Clarity, UA SL CLOUDY (A) Clear    Glucose, Ur Negative Negative mg/dL    Bilirubin Urine MODERATE (A) Negative    Ketones, Urine 40 (A) Negative mg/dL    Specific Gravity, UA >=1.030 1.005 - 1.030    Blood, Urine TRACE-INTACT (A) Negative    pH, UA 5.0 5.0 - 8.0    Protein,  (A) Negative mg/dL    Urobilinogen, Urine 0.2 <2.0 E.U./dL    Nitrite, Urine Negative Negative    Leukocyte Esterase, Urine Negative Negative    Microscopic Examination YES     Urine Type NotGiven     Urine Reflex to Culture Yes    Microscopic Urinalysis   Result Value Ref Range    Fine Casts, UA 3-5 (A) 0 - 2 /LPF    Coarse Casts, UA 0-2 0 - 2 /LPF    WBC, UA 10-20 (A) 0 - 5 /HPF    RBC, UA 3-4 0 - 4 /HPF    Epithelial Cells, UA 11-20 (A) 0 - 5 /HPF Bacteria, UA 1+ (A) None Seen /HPF    Amorphous, UA 1+ /HPF   Lactate, Sepsis   Result Value Ref Range    Lactic Acid, Sepsis 1.6 0.4 - 1.9 mmol/L   Troponin   Result Value Ref Range    Troponin <0.01 <0.01 ng/mL   EKG 12 Lead   Result Value Ref Range    Ventricular Rate 119 BPM    Atrial Rate 119 BPM    P-R Interval 156 ms    QRS Duration 62 ms    Q-T Interval 318 ms    QTc Calculation (Bazett) 447 ms    P Axis 50 degrees    R Axis 6 degrees    T Axis 30 degrees    Diagnosis       Sinus tachycardiaOtherwise normal ECGWhen compared with ECG of 07-MAR-2013 14:28,Previous ECG has undetermined rhythm, needs review     RADIOLOGY    CT Head WO Contrast    Result Date: 8/13/2021  EXAMINATION: CT OF THE HEAD WITHOUT CONTRAST  8/13/2021 1:12 am TECHNIQUE: CT of the head was performed without the administration of intravenous contrast. Dose modulation, iterative reconstruction, and/or weight based adjustment of the mA/kV was utilized to reduce the radiation dose to as low as reasonably achievable. COMPARISON: None. HISTORY: ORDERING SYSTEM PROVIDED HISTORY: confusion FINDINGS: BRAIN/VENTRICLES: There is no acute intracranial hemorrhage, mass effect or midline shift. No abnormal extra-axial fluid collection. The gray-white differentiation is maintained without evidence of an acute infarct. There is no evidence of hydrocephalus. ORBITS: The visualized portion of the orbits demonstrate no acute abnormality. SINUSES: The visualized paranasal sinuses and mastoid air cells demonstrate no acute abnormality. SOFT TISSUES/SKULL:  No acute abnormality of the visualized skull or soft tissues. No acute intracranial abnormality.      CT ABDOMEN PELVIS W IV CONTRAST Additional Contrast? None    Result Date: 8/13/2021  EXAMINATION: CT OF THE ABDOMEN AND PELVIS WITH CONTRAST 8/13/2021 1:40 am TECHNIQUE: CT of the abdomen and pelvis was performed with the administration of intravenous contrast. Multiplanar reformatted images are nonspecific and suggests underlying enterocolitis. Infectious or inflammatory etiologies may be considered. 2. No pattern of bowel obstruction. 3. Hepatic steatosis. 4. Airspace changes in the lower chest likely represent underlying viral pneumonia. XR CHEST PORTABLE    Result Date: 8/13/2021  EXAMINATION: ONE XRAY VIEW OF THE CHEST 8/13/2021 12:42 am COMPARISON: Chest x-ray 03/07/2013. HISTORY: ORDERING SYSTEM PROVIDED HISTORY: +COVID TECHNOLOGIST PROVIDED HISTORY: Reason for exam:->+COVID Reason for Exam: covid positive FINDINGS: Cardiomediastinal silhouette is enlarged and suboptimally evaluated due to rotated projection with low lung volumes. Perihilar opacities. Patchy left basilar opacity. No pleural effusion on this projection. No pneumothorax appreciated on this projection. Perihilar opacities could represent COVID pneumonia given indication. Correlate with presentation to exclude superimposed congestive heart failure. ED COURSE/MDM  Patient seen and evaluated. Old records reviewed. Diagnostic testing reviewed and results discussed. This patient was also seen and evaluated by Marcelo Trent MD. We thoroughly discussed thehistory, physical exam, diagnostic testing and emergency department course. Gita Belcher presented to the ED with the above noted complaints. Physical exam reveals patient to be confused/poor historian. She is alert and oriented to person, place and time. She however can not provide history. She starts to tell me something/answer a question and trails off. Can not give me specifics on why she is here. Follows commands. There is a leukopenia as WBC are low at 2.4, absolute neutrophils low at 1.6. Absolute lymphocytes low at 0.6. No further differential shift. No anemia. No significant electrolyte abnormality. No evidence of acute kidney injury. There is a transaminitis as ALT and AST are elevated at 73/90. Urinalysis is without evidence of infection.

## 2021-08-13 NOTE — ED NOTES
Called floor RN to get estimate for transfer to floor, floor RN stated at lease 30 minutes, charge nurse made aware      Mai Michaels RN  08/13/21 [phone]

## 2021-08-13 NOTE — ED NOTES
PS Infectious Disease @ 1238. Re; covid, resp failure, early in course, candidate for remdesivir/tocilizumab? No callback required.        Mitsi Guadarrama  08/13/21 [id number]

## 2021-08-14 LAB
C DIFF TOXIN/ANTIGEN: NORMAL
FERRITIN: 687.5 NG/ML (ref 15–150)
FIBRINOGEN: 304 MG/DL (ref 200–397)
VITAMIN D 25-HYDROXY: 11.4 NG/ML

## 2021-08-14 PROCEDURE — 2580000003 HC RX 258: Performed by: INTERNAL MEDICINE

## 2021-08-14 PROCEDURE — 6370000000 HC RX 637 (ALT 250 FOR IP): Performed by: NURSE PRACTITIONER

## 2021-08-14 PROCEDURE — 82306 VITAMIN D 25 HYDROXY: CPT

## 2021-08-14 PROCEDURE — 6370000000 HC RX 637 (ALT 250 FOR IP): Performed by: INTERNAL MEDICINE

## 2021-08-14 PROCEDURE — 85384 FIBRINOGEN ACTIVITY: CPT

## 2021-08-14 PROCEDURE — 6360000002 HC RX W HCPCS: Performed by: INTERNAL MEDICINE

## 2021-08-14 PROCEDURE — 82728 ASSAY OF FERRITIN: CPT

## 2021-08-14 PROCEDURE — 96376 TX/PRO/DX INJ SAME DRUG ADON: CPT

## 2021-08-14 PROCEDURE — G0378 HOSPITAL OBSERVATION PER HR: HCPCS

## 2021-08-14 PROCEDURE — 96372 THER/PROPH/DIAG INJ SC/IM: CPT

## 2021-08-14 RX ORDER — GUAIFENESIN/DEXTROMETHORPHAN 100-10MG/5
5 SYRUP ORAL EVERY 4 HOURS PRN
Status: DISCONTINUED | OUTPATIENT
Start: 2021-08-14 | End: 2021-08-25 | Stop reason: HOSPADM

## 2021-08-14 RX ORDER — GUAIFENESIN 600 MG/1
600 TABLET, EXTENDED RELEASE ORAL 2 TIMES DAILY
Status: DISCONTINUED | OUTPATIENT
Start: 2021-08-14 | End: 2021-08-18

## 2021-08-14 RX ADMIN — ENOXAPARIN SODIUM 30 MG: 30 INJECTION SUBCUTANEOUS at 20:18

## 2021-08-14 RX ADMIN — Medication 2000 UNITS: at 08:16

## 2021-08-14 RX ADMIN — SODIUM CHLORIDE, PRESERVATIVE FREE 10 ML: 5 INJECTION INTRAVENOUS at 08:16

## 2021-08-14 RX ADMIN — GUAIFENESIN 600 MG: 600 TABLET, EXTENDED RELEASE ORAL at 20:17

## 2021-08-14 RX ADMIN — METHYLPREDNISOLONE SODIUM SUCCINATE 40 MG: 40 INJECTION, POWDER, FOR SOLUTION INTRAMUSCULAR; INTRAVENOUS at 20:19

## 2021-08-14 RX ADMIN — MONTELUKAST SODIUM 10 MG: 10 TABLET ORAL at 20:17

## 2021-08-14 RX ADMIN — PAROXETINE HYDROCHLORIDE 40 MG: 20 TABLET, FILM COATED ORAL at 08:16

## 2021-08-14 RX ADMIN — ENOXAPARIN SODIUM 30 MG: 30 INJECTION SUBCUTANEOUS at 08:16

## 2021-08-14 RX ADMIN — METHYLPREDNISOLONE SODIUM SUCCINATE 40 MG: 40 INJECTION, POWDER, FOR SOLUTION INTRAMUSCULAR; INTRAVENOUS at 08:16

## 2021-08-14 RX ADMIN — SODIUM CHLORIDE, PRESERVATIVE FREE 10 ML: 5 INJECTION INTRAVENOUS at 20:20

## 2021-08-14 RX ADMIN — GUAIFENESIN AND DEXTROMETHORPHAN 5 ML: 100; 10 SYRUP ORAL at 18:37

## 2021-08-14 RX ADMIN — PANTOPRAZOLE SODIUM 40 MG: 40 TABLET, DELAYED RELEASE ORAL at 06:32

## 2021-08-14 RX ADMIN — SODIUM CHLORIDE: 9 INJECTION, SOLUTION INTRAVENOUS at 06:34

## 2021-08-14 RX ADMIN — GUAIFENESIN 600 MG: 600 TABLET, EXTENDED RELEASE ORAL at 01:20

## 2021-08-14 RX ADMIN — GUAIFENESIN 600 MG: 600 TABLET, EXTENDED RELEASE ORAL at 08:16

## 2021-08-14 NOTE — PROGRESS NOTES
Hospitalist Progress Note      PCP: No primary care provider on file. Date of Admission: 8/12/2021    Chief Complaint:   covid/dehydration    Hospital Course: reviewed     Subjective: feels better today       Medications:  Reviewed    Infusion Medications    sodium chloride      sodium chloride 75 mL/hr at 08/14/21 9463     Scheduled Medications    guaiFENesin  600 mg Oral BID    montelukast  10 mg Oral Nightly    pantoprazole  40 mg Oral QAM AC    PARoxetine  40 mg Oral Daily    sodium chloride flush  5-40 mL Intravenous 2 times per day    methylPREDNISolone  40 mg Intravenous Q12H    Vitamin D  2,000 Units Oral Daily    enoxaparin  30 mg Subcutaneous BID     PRN Meds: guaiFENesin-dextromethorphan, benzocaine-menthol, zolpidem, sodium chloride flush, sodium chloride, ondansetron **OR** ondansetron, polyethylene glycol, acetaminophen **OR** acetaminophen, acetaminophen **OR** acetaminophen      Intake/Output Summary (Last 24 hours) at 8/14/2021 0828  Last data filed at 8/14/2021 0719  Gross per 24 hour   Intake 330 ml   Output 800 ml   Net -470 ml       Physical Exam Performed:    /75   Pulse 100   Temp 98 °F (36.7 °C) (Oral)   Resp 18   Ht 5' (1.524 m)   Wt 185 lb (83.9 kg)   LMP 04/15/2012   SpO2 93%   BMI 36.13 kg/m²   General appearance:  no distress today, appears stated age and cooperative. more awake  HEENT:  Normal cephalic, atraumatic without obvious deformity. Pupils equal, round, and reactive to light. Extra ocular muscles intact. Conjunctivae/corneas clear. Neck: Supple, with full range of motion. No jugular venous distention. Trachea midline. Respiratory:  inc'd respiratory effort. Clear to auscultation, bilaterally without Wheezes/Rhonchi. Mild bibas rales seem improved  Cardiovascular:  Regular rate and rhythm with normal S1/S2 without murmurs, rubs or gallops. Abdomen: Soft, non-tender, non-distended with normal bowel sounds.   Musculoskeletal:  No clubbing, cyanosis or edema bilaterally. Full range of motion without deformity. Skin: Skin color, texture, turgor normal.  No rashes or lesions. Neurologic:  Neurovascularly intact without any focal sensory/motor deficits. Cranial nerves: II-XII intact, grossly non-focal.  Psychiatric:  Alert and oriented, thought content appropriate, normal insight  Capillary Refill: Brisk,3 seconds, normal  Peripheral Pulses: +2 palpable, equal bilaterally      Labs:   Recent Labs     08/12/21 1910   WBC 2.4*   HGB 14.2   HCT 41.4        Recent Labs     08/12/21 1910   *   K 3.8   CL 98*   CO2 21   BUN 12   CREATININE 1.1   CALCIUM 8.5     Recent Labs     08/12/21 1910   AST 90*   ALT 73*   BILITOT 0.3   ALKPHOS 98     No results for input(s): INR in the last 72 hours. Recent Labs     08/13/21  0100   TROPONINI <0.01       Urinalysis:      Lab Results   Component Value Date    NITRU Negative 08/12/2021    WBCUA 10-20 08/12/2021    BACTERIA 1+ 08/12/2021    RBCUA 3-4 08/12/2021    BLOODU TRACE-INTACT 08/12/2021    SPECGRAV >=1.030 08/12/2021    GLUCOSEU Negative 08/12/2021       Radiology:  CT Head WO Contrast   Final Result   No acute intracranial abnormality. CT ABDOMEN PELVIS W IV CONTRAST Additional Contrast? None   Final Result   1. Mucosal thickening and enhancement of the terminal ileum, proximal colon   and sigmoid colon/rectum. Pattern is nonspecific and suggests underlying   enterocolitis. Infectious or inflammatory etiologies may be considered. 2. No pattern of bowel obstruction. 3. Hepatic steatosis. 4. Airspace changes in the lower chest likely represent underlying viral   pneumonia. XR CHEST PORTABLE   Final Result   Perihilar opacities could represent COVID pneumonia given indication. Correlate with presentation to exclude superimposed congestive heart failure.                  Assessment/Plan:    Active Hospital Problems    Diagnosis     COVID-19 [U07.1]          Sepsis- due to covid, with tachypnea/tachycardia and +rapid test, pt was recently diagnosed on 8/11  -supportive care  -ivfs given  -lactate was wnl  -no abx given viral etiology     Acute encephalopathy- seems improved with ivfs, due to ?covid, CThead no acute abn  -tele  -neurochecks     Acute resp failure- with sats 88% ra in ER per staff, likely from covid  -iv solumedrol q12h ordered  -supportive care  -tele  -ID consulted, apprec recs, considered IL6 or JK inhibitor if worsens     N/v/abd pain/diarrhea- likely from covid, CTa/p done(suggestive of enterocolitis, no obstruction, noted hepatiac steatosis)  -continue supportive care  - cdiff negative  -ua was abn and ucx > 50k mixed mike, no UTI at this time    Asthma- on home singulair     Anxiety-continued paxil        DVT Prophylaxis: lovenox bid  Diet: ADULT DIET;  Regular  Code Status: Full Code    PT/OT Eval Status: not ordered     Dispo - pending improvement, apprec ID recs, likely tomorrow if remains stable    Heather Murry MD

## 2021-08-14 NOTE — PLAN OF CARE
Problem: Isolation Precautions - Risk of Spread of Infection  Goal: Prevent transmission of infection  Outcome: Met This Shift     Problem: Airway Clearance - Ineffective  Goal: Achieve or maintain patent airway  Outcome: Ongoing     Problem: Gas Exchange - Impaired  Goal: Absence of hypoxia  Outcome: Ongoing  Goal: Promote optimal lung function  Outcome: Ongoing     Problem: Breathing Pattern - Ineffective  Goal: Ability to achieve and maintain a regular respiratory rate  Outcome: Ongoing     Problem:  Body Temperature -  Risk of, Imbalanced  Goal: Ability to maintain a body temperature within defined limits  Outcome: Ongoing     Problem: Nutrition Deficits  Goal: Optimize nutritional status  Outcome: Ongoing     Problem: Risk for Fluid Volume Deficit  Goal: Maintain normal heart rhythm  Outcome: Ongoing

## 2021-08-14 NOTE — PROGRESS NOTES
Pt A&Ox4. VSS- on RA. Shift assessment completed. Denies other needs. Call light within reach, bed in lowest position, wheels locked.

## 2021-08-15 PROCEDURE — 6370000000 HC RX 637 (ALT 250 FOR IP): Performed by: INTERNAL MEDICINE

## 2021-08-15 PROCEDURE — 96372 THER/PROPH/DIAG INJ SC/IM: CPT

## 2021-08-15 PROCEDURE — 6360000002 HC RX W HCPCS: Performed by: INTERNAL MEDICINE

## 2021-08-15 PROCEDURE — 2580000003 HC RX 258: Performed by: INTERNAL MEDICINE

## 2021-08-15 PROCEDURE — 94761 N-INVAS EAR/PLS OXIMETRY MLT: CPT

## 2021-08-15 PROCEDURE — G0378 HOSPITAL OBSERVATION PER HR: HCPCS

## 2021-08-15 PROCEDURE — 2700000000 HC OXYGEN THERAPY PER DAY

## 2021-08-15 PROCEDURE — 96376 TX/PRO/DX INJ SAME DRUG ADON: CPT

## 2021-08-15 PROCEDURE — 6370000000 HC RX 637 (ALT 250 FOR IP): Performed by: NURSE PRACTITIONER

## 2021-08-15 RX ADMIN — ENOXAPARIN SODIUM 30 MG: 30 INJECTION SUBCUTANEOUS at 21:28

## 2021-08-15 RX ADMIN — PANTOPRAZOLE SODIUM 40 MG: 40 TABLET, DELAYED RELEASE ORAL at 06:01

## 2021-08-15 RX ADMIN — METHYLPREDNISOLONE SODIUM SUCCINATE 40 MG: 40 INJECTION, POWDER, FOR SOLUTION INTRAMUSCULAR; INTRAVENOUS at 09:18

## 2021-08-15 RX ADMIN — Medication 1 LOZENGE: at 21:36

## 2021-08-15 RX ADMIN — GUAIFENESIN 600 MG: 600 TABLET, EXTENDED RELEASE ORAL at 08:18

## 2021-08-15 RX ADMIN — SODIUM CHLORIDE, PRESERVATIVE FREE 10 ML: 5 INJECTION INTRAVENOUS at 08:18

## 2021-08-15 RX ADMIN — SODIUM CHLORIDE, PRESERVATIVE FREE 10 ML: 5 INJECTION INTRAVENOUS at 21:24

## 2021-08-15 RX ADMIN — GUAIFENESIN 600 MG: 600 TABLET, EXTENDED RELEASE ORAL at 21:22

## 2021-08-15 RX ADMIN — GUAIFENESIN AND DEXTROMETHORPHAN 5 ML: 100; 10 SYRUP ORAL at 21:30

## 2021-08-15 RX ADMIN — MONTELUKAST SODIUM 10 MG: 10 TABLET ORAL at 21:22

## 2021-08-15 RX ADMIN — Medication 2000 UNITS: at 08:18

## 2021-08-15 RX ADMIN — ENOXAPARIN SODIUM 30 MG: 30 INJECTION SUBCUTANEOUS at 08:18

## 2021-08-15 RX ADMIN — METHYLPREDNISOLONE SODIUM SUCCINATE 40 MG: 40 INJECTION, POWDER, FOR SOLUTION INTRAMUSCULAR; INTRAVENOUS at 21:24

## 2021-08-15 RX ADMIN — PAROXETINE HYDROCHLORIDE 40 MG: 20 TABLET, FILM COATED ORAL at 08:18

## 2021-08-15 NOTE — PROGRESS NOTES
Hospitalist Progress Note      PCP: No primary care provider on file. Date of Admission: 8/12/2021    Chief Complaint:   covid/dehydration     Hospital Course: reviewed      Subjective:  again hypoxic and back on oxygen, does feel improved from admission       Medications:  Reviewed    Infusion Medications    sodium chloride       Scheduled Medications    guaiFENesin  600 mg Oral BID    montelukast  10 mg Oral Nightly    pantoprazole  40 mg Oral QAM AC    PARoxetine  40 mg Oral Daily    sodium chloride flush  5-40 mL Intravenous 2 times per day    methylPREDNISolone  40 mg Intravenous Q12H    Vitamin D  2,000 Units Oral Daily    enoxaparin  30 mg Subcutaneous BID     PRN Meds: guaiFENesin-dextromethorphan, benzocaine-menthol, zolpidem, sodium chloride flush, sodium chloride, ondansetron **OR** ondansetron, polyethylene glycol, acetaminophen **OR** acetaminophen, acetaminophen **OR** acetaminophen      Intake/Output Summary (Last 24 hours) at 8/15/2021 0956  Last data filed at 8/15/2021 0602  Gross per 24 hour   Intake 2885.49 ml   Output 1750 ml   Net 1135.49 ml       Physical Exam Performed:    /76   Pulse 100   Temp 97.9 °F (36.6 °C) (Oral)   Resp 18   Ht 5' (1.524 m)   Wt 185 lb (83.9 kg)   LMP 04/15/2012   SpO2 91%   BMI 36.13 kg/m²       General appearance:  no distress today, appears stated age and cooperative. more awake  HEENT:  Normal cephalic, atraumatic without obvious deformity. Pupils equal, round, and reactive to light.  Extra ocular muscles intact. Conjunctivae/corneas clear. Neck: Supple, with full range of motion. No jugular venous distention. Trachea midline. Respiratory:  inc'd respiratory effort. Clear to auscultation, bilaterally without Wheezes/Rhonchi. Mild bibas rales seem improved  Cardiovascular:  Regular rate and rhythm with normal S1/S2 without murmurs, rubs or gallops.   Abdomen: Soft, non-tender, non-distended with normal bowel sounds. Musculoskeletal:  No clubbing, cyanosis or edema bilaterally.  Full range of motion without deformity. Skin: Skin color, texture, turgor normal.  No rashes or lesions. Neurologic:  Neurovascularly intact without any focal sensory/motor deficits. Cranial nerves: II-XII intact, grossly non-focal.  Psychiatric:  Alert and oriented, thought content appropriate, normal insight  Capillary Refill: Brisk,3 seconds, normal  Peripheral Pulses: +2 palpable, equal bilaterally     Labs:   Recent Labs     08/12/21 1910   WBC 2.4*   HGB 14.2   HCT 41.4        Recent Labs     08/12/21 1910   *   K 3.8   CL 98*   CO2 21   BUN 12   CREATININE 1.1   CALCIUM 8.5     Recent Labs     08/12/21 1910   AST 90*   ALT 73*   BILITOT 0.3   ALKPHOS 98     No results for input(s): INR in the last 72 hours. Recent Labs     08/13/21  0100   TROPONINI <0.01       Urinalysis:      Lab Results   Component Value Date    NITRU Negative 08/12/2021    WBCUA 10-20 08/12/2021    BACTERIA 1+ 08/12/2021    RBCUA 3-4 08/12/2021    BLOODU TRACE-INTACT 08/12/2021    SPECGRAV >=1.030 08/12/2021    GLUCOSEU Negative 08/12/2021       Radiology:  CT Head WO Contrast   Final Result   No acute intracranial abnormality. CT ABDOMEN PELVIS W IV CONTRAST Additional Contrast? None   Final Result   1. Mucosal thickening and enhancement of the terminal ileum, proximal colon   and sigmoid colon/rectum. Pattern is nonspecific and suggests underlying   enterocolitis. Infectious or inflammatory etiologies may be considered. 2. No pattern of bowel obstruction. 3. Hepatic steatosis. 4. Airspace changes in the lower chest likely represent underlying viral   pneumonia. XR CHEST PORTABLE   Final Result   Perihilar opacities could represent COVID pneumonia given indication. Correlate with presentation to exclude superimposed congestive heart failure.                  Assessment/Plan:    Active Hospital Problems    Diagnosis     COVID-19 [U07.1]        Sepsis- due to covid, with tachypnea/tachycardia and +rapid test, pt was recently diagnosed on 8/11  -supportive care  -ivfs given  -lactate was wnl  -no abx given viral etiology     Acute encephalopathy- seems improved with ivfs, due to ?covid, CThead no acute abn  -tele  -neurochecks     Acute resp failure- with sats 88% ra in ER per staff, likely from covid  -iv solumedrol q12h ordered  -supportive care  -tele  -ID consulted, apprec recs, considered IL6 or JK inhibitor if worsens     N/v/abd pain/diarrhea- likely from covid, CTa/p done(suggestive of enterocolitis, no obstruction, noted hepatiac steatosis)  -continue supportive care  - cdiff negative  -ua was abn and ucx > 50k mixed mike, no UTI at this time     Asthma- on home singulair     Anxiety-continued paxil        DVT Prophylaxis: lovenox bid  Diet: ADULT DIET;  Regular  Code Status: Full Code     PT/OT Eval Status: not ordered     Dispo - pending improvement, off oxygen, apprec ID recs, likely Monday if remains stable off Dacia Griggs MD

## 2021-08-15 NOTE — PROGRESS NOTES
Writer entered room to check on pt, pt found with O2 out of nose, O2 checked- 84% on room air. 2.5L NC applied, O2 now 92%. Pt resting comfortably in bed.

## 2021-08-15 NOTE — PROGRESS NOTES
Pt alert and oriented. VSS, O2 94% on 2L NC. Pt states SOB has improved. Assessment completed as charted. Denies any pain or further needs at present time. Resting comfortably in bed, bedside table and call light within reach. Bed locked and in lowest position.

## 2021-08-16 ENCOUNTER — APPOINTMENT (OUTPATIENT)
Dept: GENERAL RADIOLOGY | Age: 61
DRG: 870 | End: 2021-08-16

## 2021-08-16 LAB
A/G RATIO: 1.1 (ref 1.1–2.2)
ALBUMIN SERPL-MCNC: 3.4 G/DL (ref 3.4–5)
ALP BLD-CCNC: 58 U/L (ref 40–129)
ALT SERPL-CCNC: 43 U/L (ref 10–40)
ANION GAP SERPL CALCULATED.3IONS-SCNC: 12 MMOL/L (ref 3–16)
AST SERPL-CCNC: 50 U/L (ref 15–37)
BASE EXCESS VENOUS: 0.5 MMOL/L (ref -3–3)
BILIRUB SERPL-MCNC: 0.4 MG/DL (ref 0–1)
BUN BLDV-MCNC: 17 MG/DL (ref 7–20)
CALCIUM SERPL-MCNC: 8.3 MG/DL (ref 8.3–10.6)
CARBOXYHEMOGLOBIN: 1.5 % (ref 0–1.5)
CHLORIDE BLD-SCNC: 102 MMOL/L (ref 99–110)
CO2: 23 MMOL/L (ref 21–32)
CREAT SERPL-MCNC: 0.9 MG/DL (ref 0.6–1.2)
GFR AFRICAN AMERICAN: >60
GFR NON-AFRICAN AMERICAN: >60
GLOBULIN: 3.2 G/DL
GLUCOSE BLD-MCNC: 137 MG/DL (ref 70–99)
HCO3 VENOUS: 24.7 MMOL/L (ref 23–29)
HCT VFR BLD CALC: 36.2 % (ref 36–48)
HEMOGLOBIN: 12.6 G/DL (ref 12–16)
MAGNESIUM: 2.3 MG/DL (ref 1.8–2.4)
MCH RBC QN AUTO: 31.3 PG (ref 26–34)
MCHC RBC AUTO-ENTMCNC: 34.7 G/DL (ref 31–36)
MCV RBC AUTO: 90.2 FL (ref 80–100)
METHEMOGLOBIN VENOUS: 0.3 %
O2 SAT, VEN: 86 %
O2 THERAPY: ABNORMAL
PCO2, VEN: 38.4 MMHG (ref 40–50)
PDW BLD-RTO: 13.6 % (ref 12.4–15.4)
PH VENOUS: 7.43 (ref 7.35–7.45)
PLATELET # BLD: 175 K/UL (ref 135–450)
PMV BLD AUTO: 8.6 FL (ref 5–10.5)
PO2, VEN: 50.6 MMHG (ref 25–40)
POTASSIUM REFLEX MAGNESIUM: 3.4 MMOL/L (ref 3.5–5.1)
RBC # BLD: 4.02 M/UL (ref 4–5.2)
SODIUM BLD-SCNC: 137 MMOL/L (ref 136–145)
TCO2 CALC VENOUS: 26 MMOL/L
TOTAL PROTEIN: 6.6 G/DL (ref 6.4–8.2)
WBC # BLD: 3.2 K/UL (ref 4–11)

## 2021-08-16 PROCEDURE — 1200000000 HC SEMI PRIVATE

## 2021-08-16 PROCEDURE — 2580000003 HC RX 258: Performed by: INTERNAL MEDICINE

## 2021-08-16 PROCEDURE — 80053 COMPREHEN METABOLIC PANEL: CPT

## 2021-08-16 PROCEDURE — 99232 SBSQ HOSP IP/OBS MODERATE 35: CPT | Performed by: INTERNAL MEDICINE

## 2021-08-16 PROCEDURE — G0378 HOSPITAL OBSERVATION PER HR: HCPCS

## 2021-08-16 PROCEDURE — 99222 1ST HOSP IP/OBS MODERATE 55: CPT | Performed by: INTERNAL MEDICINE

## 2021-08-16 PROCEDURE — 6360000002 HC RX W HCPCS: Performed by: INTERNAL MEDICINE

## 2021-08-16 PROCEDURE — 6370000000 HC RX 637 (ALT 250 FOR IP): Performed by: INTERNAL MEDICINE

## 2021-08-16 PROCEDURE — 71045 X-RAY EXAM CHEST 1 VIEW: CPT

## 2021-08-16 PROCEDURE — 6370000000 HC RX 637 (ALT 250 FOR IP): Performed by: NURSE PRACTITIONER

## 2021-08-16 PROCEDURE — 94761 N-INVAS EAR/PLS OXIMETRY MLT: CPT

## 2021-08-16 PROCEDURE — 82803 BLOOD GASES ANY COMBINATION: CPT

## 2021-08-16 PROCEDURE — 96372 THER/PROPH/DIAG INJ SC/IM: CPT

## 2021-08-16 PROCEDURE — 83735 ASSAY OF MAGNESIUM: CPT

## 2021-08-16 PROCEDURE — 85027 COMPLETE CBC AUTOMATED: CPT

## 2021-08-16 PROCEDURE — 36415 COLL VENOUS BLD VENIPUNCTURE: CPT

## 2021-08-16 PROCEDURE — 2700000000 HC OXYGEN THERAPY PER DAY

## 2021-08-16 RX ADMIN — GUAIFENESIN 600 MG: 600 TABLET, EXTENDED RELEASE ORAL at 20:15

## 2021-08-16 RX ADMIN — MONTELUKAST SODIUM 10 MG: 10 TABLET ORAL at 20:15

## 2021-08-16 RX ADMIN — ENOXAPARIN SODIUM 30 MG: 30 INJECTION SUBCUTANEOUS at 20:15

## 2021-08-16 RX ADMIN — PANTOPRAZOLE SODIUM 40 MG: 40 TABLET, DELAYED RELEASE ORAL at 08:13

## 2021-08-16 RX ADMIN — SODIUM CHLORIDE, PRESERVATIVE FREE 10 ML: 5 INJECTION INTRAVENOUS at 08:12

## 2021-08-16 RX ADMIN — PAROXETINE HYDROCHLORIDE 40 MG: 20 TABLET, FILM COATED ORAL at 08:13

## 2021-08-16 RX ADMIN — METHYLPREDNISOLONE SODIUM SUCCINATE 40 MG: 40 INJECTION, POWDER, FOR SOLUTION INTRAMUSCULAR; INTRAVENOUS at 20:15

## 2021-08-16 RX ADMIN — Medication 2000 UNITS: at 08:12

## 2021-08-16 RX ADMIN — TOCILIZUMAB 648 MG: 180 INJECTION, SOLUTION SUBCUTANEOUS at 12:37

## 2021-08-16 RX ADMIN — SODIUM CHLORIDE, PRESERVATIVE FREE 10 ML: 5 INJECTION INTRAVENOUS at 20:15

## 2021-08-16 RX ADMIN — METHYLPREDNISOLONE SODIUM SUCCINATE 40 MG: 40 INJECTION, POWDER, FOR SOLUTION INTRAMUSCULAR; INTRAVENOUS at 10:39

## 2021-08-16 RX ADMIN — GUAIFENESIN 600 MG: 600 TABLET, EXTENDED RELEASE ORAL at 08:13

## 2021-08-16 RX ADMIN — ENOXAPARIN SODIUM 30 MG: 30 INJECTION SUBCUTANEOUS at 08:13

## 2021-08-16 ASSESSMENT — PAIN SCALES - GENERAL
PAINLEVEL_OUTOF10: 0

## 2021-08-16 NOTE — PROGRESS NOTES
Shift assessments completed and charted. Pt a/o x4, VSS, saturations fluctuating between 90-93% on HFNC @ 15LPM + NON-RE BREATHER mask. Rhonchi heard upon auscultation on upper lobes. Dyspnea at rest, labored breathing, instructed not to get OOB, minimal exertion to decrease O2 demand. Will closely monitor.

## 2021-08-16 NOTE — PROGRESS NOTES
08/16/21 0837   Oxygen Therapy/Pulse Ox   O2 Therapy Oxygen   $Oxygen $Daily Charge   O2 Device High flow nasal cannula  (PLUS A NRB MASK AT 15 LPM)   O2 Flow Rate (L/min) 15 L/min   Resp 22   SpO2 93 %   $Pulse Oximeter $Spot check (multiple/continuous)

## 2021-08-16 NOTE — PROGRESS NOTES
Pt alert and oriented. Pt desats with coughing, and O2 continues to drop to mid 80s. High flow NC placed at 10L, pt O2 now 91-92%. Pt placed on continuous pulse ox. NP made aware. Assessment completed as charted. Diminished lung sounds throughout. PRN cough syrup and lozenges given per MAR. Pt states SOB slowly improving. Encouraged pt to prone, pt denied at present time. Resting in bed, call light and bedside table within reach. Bed locked and in lowest position.

## 2021-08-16 NOTE — PROGRESS NOTES
Infectious Disease Follow up Notes    CC :  COVID      Antibiotics:   None    Solumedrol 40 q12    Admit Date:   8/12/2021  Hospital Day: 5    Subjective:   She remains AF. Increased oxygen requirement, now on 15L HFNC    Objective:     Patient Vitals for the past 8 hrs:   BP Temp Temp src Pulse Resp SpO2   08/16/21 1123      96 %   08/16/21 1047    92  91 %   08/16/21 1044 126/69 98.2 °F (36.8 °C) Oral 85 22 (!) 86 %   08/16/21 0837     22 93 %   08/16/21 0803      90 %   08/16/21 0800 122/75 98.5 °F (36.9 °C) Oral 98 28 (!) 88 %       EXAM:  Obese. Resting comfortably in NAD  Skin without rash exposed area  Breathing non-labored      LINE: IV site ok       Scheduled Meds:   guaiFENesin  600 mg Oral BID    montelukast  10 mg Oral Nightly    pantoprazole  40 mg Oral QAM AC    PARoxetine  40 mg Oral Daily    sodium chloride flush  5-40 mL Intravenous 2 times per day    methylPREDNISolone  40 mg Intravenous Q12H    Vitamin D  2,000 Units Oral Daily    enoxaparin  30 mg Subcutaneous BID       Continuous Infusions:   sodium chloride            Data Review:    Lab Results   Component Value Date    WBC 3.2 (L) 08/16/2021    HGB 12.6 08/16/2021    HCT 36.2 08/16/2021    MCV 90.2 08/16/2021     08/16/2021     Lab Results   Component Value Date    CREATININE 0.9 08/16/2021    BUN 17 08/16/2021     08/16/2021    K 3.4 (L) 08/16/2021     08/16/2021    CO2 23 08/16/2021       Hepatic Function Panel:   Lab Results   Component Value Date    ALKPHOS 58 08/16/2021    ALT 43 08/16/2021    AST 50 08/16/2021    PROT 6.6 08/16/2021    PROT 7.0 03/08/2013    BILITOT 0.4 08/16/2021    LABALBU 3.4 08/16/2021         MICRO:  8/12     UC NGTD  8/13     COVID NAAT +    C diff neg       IMAGING:  CT a/p   Impression   1.  Mucosal thickening and enhancement of the terminal ileum, proximal colon   and sigmoid colon/rectum. Colen Myton is nonspecific and suggests underlying   enterocolitis.  Infectious or inflammatory etiologies may be considered. 2. No pattern of bowel obstruction. 3. Hepatic steatosis.    4. Airspace changes in the lower chest likely represent underlying viral   pneumonia.        Assessment:     Patient Active Problem List    Diagnosis Date Noted    Chest pain 03/07/2013     Priority: High    Acute respiratory failure with hypoxia (HCC)     Altered mental status     Mild intermittent asthma without complication     CINDY on CPAP     Elevated transaminase level     Hepatic steatosis     Obesity (BMI 35.0-39.9 without comorbidity)     COVID-19 08/13/2021    Anxiety and depression 08/15/2012       COVID-19   Unvaccinated host  Date of symptom onset 8/7/21  Date of diagnosis and admission 8/13/21 (date of outpt +test unclear)  Severe disease with worsening hypoxemic respiratory failure   -continue steroid   -I ordered a dose of IL6 inh  -isolation as ordered  -supportive care      CT evidence of possible enterocolitis  -C diff test negative             Jean Mcelroy MD  Phone: [phone]   Fax : [phone]

## 2021-08-16 NOTE — PROGRESS NOTES
Secure message to [address], MD:    Do you want vapotherm or just continue doing high flow + nonrebreather for now. O2 currently 91% on both      Order to continue to do both; vapotherm if desats. O2 now 92%.

## 2021-08-16 NOTE — PROGRESS NOTES
08/16/21 1123   Oxygen Therapy/Pulse Ox   O2 Therapy Oxygen humidified   O2 Device High flow nasal cannula  (PLUS A NRB MASK AT 15LPM)   O2 Flow Rate (L/min) 15 L/min   SpO2 96 %

## 2021-08-16 NOTE — CARE COORDINATION
Patient in droplet precautions for covid. Attempt to assess via phone with no answer. Chart reviewed. Care managed per pulmonology, ID and IM. Patient with no insurance provider and no PCP listed. Currently on 15LHF O2. Per pulm note has CPAP at home. Will continue to follow and assess as able. Winston Gamboa RN

## 2021-08-16 NOTE — CONSULTS
INPATIENT PULMONARY CRITICAL CARE CONSULT NOTE      Chief Complaint/Referring Provider:  Patient is being seen at the request of Dr. Hunter Lr for a consultation for worsening respiratory failure, Covid     Presenting HPI: Pippa Nolan is a 70-year-old female past history of asthma, iron deficiency anemia, menorrhagia, hemorrhoids during puerperium and anxiety. She is a former smoker. The patient presented to the ER on 8/13/21. She was seen at an urgent care with nausea and vomiting, had a positive Covid test on 8/12/2021. When seen in the ER, he was confused, could not maintain her train of thought. She was hemodynamically stable, had unlabored breathing. She was tachycardic. Labs showed leukopenia, hyponatremia, mildly elevated glucose, elevated ALT and AST. CT head was unremarkable. CT of the abdomen and pelvis showed mucosal thickening and enhancement of the terminal ileum, proximal colon, sigmoid colon/rectum suggesting enterocolitis. There was no bowel obstruction. She had hepatic steatosis and airspace changes in the lower chest likely representing viral pneumonia. CXR showed bilateral patchy infiltrates. He was given IV fluids, Zofran, morphine. On admission she desaturated, was on oxygen at 3 LPM.  She was hospitalized, seen by Dr. Gerardo Cody from [facility]. The patient has remained afebrile and hemodynamically stable, his oxygen requirement since the evening of 8/14. The patient was examined today. She is pleasant, ill-appearing. She does not know her , moved from [place] a few years ago. She works in customer service. She quit smoking more than 20 years ago. She has CINDY, is on CPAP, her sleep physician was in [place]. She has gained about 20 pounds over the last few years. She has no children. Presently, the patient says she is doing \"all right\", but as per nursing she desaturates when she is taken off 100% nonrebreather mask.   He is on both high flow nasal cannula and 100% nonrebreather mask at present. He has minimal cough, non productive. She denies phlegm. She says her appetite is slowly improving. She has no GI or  complaints. She denies leg edema. At home she rarely needs the inhaler. The rest of her ROS was negative. Patient Active Problem List    Diagnosis Date Noted    Chest pain 03/07/2013    COVID-19 08/13/2021    Anxiety and depression 08/15/2012       Past Medical History:   Diagnosis Date    Anxiety     Asthma     COVID-19 08/11/2021    Haemorrhoids in the puerperium, delivered, with mention of postpartum complication     Iron deficiency     Menorrhagia         Past Surgical History:   Procedure Laterality Date    BREAST REDUCTION SURGERY      LAPAROSCOPY      TONSILLECTOMY      ULNA OSTEOTOMY          Family History   Problem Relation Age of Onset    Anxiety Disorder Mother     Diabetes Mother     High Blood Pressure Father     Heart Failure Father     Heart Surgery Father     Heart Attack Father     Anxiety Disorder Sister     Diabetes Sister     High Blood Pressure Brother     Stroke Maternal Grandmother         Social History     Tobacco Use    Smoking status: Former Smoker    Smokeless tobacco: Never Used   Substance Use Topics    Alcohol use: No        Allergies   Allergen Reactions    Erythromycin     Sulphur [Colloidal Sulfur]      Physical Exam:  Blood pressure 126/69, pulse 92, temperature 98.2 °F (36.8 °C), temperature source Oral, resp. rate 22, height 5' (1.524 m), weight 185 lb (83.9 kg), last menstrual period 04/15/2012, SpO2 91 %.'   Constitutional: Pleasant, short, overweight. Mildly ill-appearing no acute distress. HENT:  Oropharynx is clear and moist.  Class III/IV airway, no oral lesions. Eyes:  Conjunctivae are normal. Pupils equal, round, and reactive to light. No scleral icterus. Neck: Short neck, no JVD. No tracheal deviation present. No obvious thyroid mass.    Cardiovascular: Normal rate, regular rhythm, normal heart sounds. No lower extremity edema. Pulmonary/Chest: No wheezes. Right infrascapular coarse rales. No accessory muscle usage or stridor. Abdominal: Soft, fatty and protuberant. Bowel sounds present. No distension or possibility tenderness. Musculoskeletal: No cyanosis. No clubbing. No obvious joint deformity. Lymphadenopathy: No cervical or supraclavicular adenopathy. Skin: Skin is warm and dry. No rash or nodules on the exposed extremities. Psychiatric: Normal mood and affect. Behavior is normal.  No anxiety. Neurologic: Alert, awake and oriented. PERRL. Speech fluent. No obvious deficit, detailed exam not performed      Imaging:  I have reviewed radiology images personally. CT Head WO Contrast   Final Result   No acute intracranial abnormality. CT ABDOMEN PELVIS W IV CONTRAST Additional Contrast? None   Final Result   1. Mucosal thickening and enhancement of the terminal ileum, proximal colon   and sigmoid colon/rectum. Pattern is nonspecific and suggests underlying   enterocolitis. Infectious or inflammatory etiologies may be considered. 2. No pattern of bowel obstruction. 3. Hepatic steatosis. 4. Airspace changes in the lower chest likely represent underlying viral   pneumonia. XR CHEST PORTABLE   Final Result   Perihilar opacities could represent COVID pneumonia given indication. Correlate with presentation to exclude superimposed congestive heart failure. CT Head WO Contrast    Result Date: 8/13/2021  EXAMINATION: CT OF THE HEAD WITHOUT CONTRAST  8/13/2021 1:12 am TECHNIQUE: CT of the head was performed without the administration of intravenous contrast. Dose modulation, iterative reconstruction, and/or weight based adjustment of the mA/kV was utilized to reduce the radiation dose to as low as reasonably achievable. COMPARISON: None.  HISTORY: ORDERING SYSTEM PROVIDED HISTORY: confusion FINDINGS: BRAIN/VENTRICLES: There is no acute intracranial hemorrhage, mass effect or midline shift. No abnormal extra-axial fluid collection. The gray-white differentiation is maintained without evidence of an acute infarct. There is no evidence of hydrocephalus. ORBITS: The visualized portion of the orbits demonstrate no acute abnormality. SINUSES: The visualized paranasal sinuses and mastoid air cells demonstrate no acute abnormality. SOFT TISSUES/SKULL:  No acute abnormality of the visualized skull or soft tissues. No acute intracranial abnormality. CT ABDOMEN PELVIS W IV CONTRAST Additional Contrast? None    Result Date: 8/13/2021  EXAMINATION: CT OF THE ABDOMEN AND PELVIS WITH CONTRAST 8/13/2021 1:40 am TECHNIQUE: CT of the abdomen and pelvis was performed with the administration of intravenous contrast. Multiplanar reformatted images are provided for review. Dose modulation, iterative reconstruction, and/or weight based adjustment of the mA/kV was utilized to reduce the radiation dose to as low as reasonably achievable. COMPARISON: None. HISTORY: ORDERING SYSTEM PROVIDED HISTORY: sudden onset pain in the left side of the abdomen upon sitting up, +COVID and confused TECHNOLOGIST PROVIDED HISTORY: Reason for exam:->sudden onset pain in the left side of the abdomen upon sitting up, +COVID and confused Additional Contrast?->None Decision Support Exception - unselect if not a suspected or confirmed emergency medical condition->Emergency Medical Condition (MA) Reason for Exam: Nausea & Vomiting (Pt dx'd with Covid yesterday and Urgent Care sent in for N/V.) Acuity: Acute Type of Exam: Initial FINDINGS: Lower Chest:  Patchy airspace opacities are partially visualized in the lower chest suspected to represent viral pneumonia given provided history. The base of the heart is normal. Organs: Diffuse hepatic steatosis with no focal lesion. There is a single cyst measuring 8 mm in the left hepatic lobe anteriorly.   The gallbladder, biliary ducts, pancreas and spleen are normal.  Kidneys and adrenal glands are normal. GI/Bowel: Stomach, duodenum and jejunum are normal.  There are nondistended fluid-filled loops of distal ileum. The terminal ileum demonstrates mild mucosal thickening and enhancement suggesting mild inflammation. There is also a short segment of mucosal thickening and enhancement at the hepatic flexure of the colon. Questionable mucosal thickening of the sigmoid colon and rectum. A normal appendix is visualized. Pelvis: The bladder is unremarkable. The uterus and adnexa are normal. Peritoneum/Retroperitoneum: The aorta tapers normally. No lymph node enlargement. Bones/Soft Tissues: No significant skeletal abnormalities. 1. Mucosal thickening and enhancement of the terminal ileum, proximal colon and sigmoid colon/rectum. Pattern is nonspecific and suggests underlying enterocolitis. Infectious or inflammatory etiologies may be considered. 2. No pattern of bowel obstruction. 3. Hepatic steatosis. 4. Airspace changes in the lower chest likely represent underlying viral pneumonia. XR CHEST PORTABLE    Result Date: 8/13/2021  EXAMINATION: ONE XRAY VIEW OF THE CHEST 8/13/2021 12:42 am COMPARISON: Chest x-ray 03/07/2013. HISTORY: ORDERING SYSTEM PROVIDED HISTORY: +COVID TECHNOLOGIST PROVIDED HISTORY: Reason for exam:->+COVID Reason for Exam: covid positive FINDINGS: Cardiomediastinal silhouette is enlarged and suboptimally evaluated due to rotated projection with low lung volumes. Perihilar opacities. Patchy left basilar opacity. No pleural effusion on this projection. No pneumothorax appreciated on this projection. Perihilar opacities could represent COVID pneumonia given indication. Correlate with presentation to exclude superimposed congestive heart failure. Echocardiogram 3/7/2013:  Normal EF, LVH, no sig valvular abnormality. PFT: None in EMR        Assessment and plan:  Acute respiratory failure, hypoxemic. Significant increase in oxygen requirement, could be worsening in spite of the fact that she clinically says she feels better. Will repeat CXR. I discussed with her about intubation and mechanical ventilation if the need arises, she is agreeable. Aggressive incentive spirometry, discussed with nursing. Acute COVID-19 pneumonia. Presented with GI symptoms and confusion, those have resolved but her respiratory status is worsening. ID following, placed on Actemra today. On appropriate dose of steroid. Acute encephalopathy. Improved  Abnormal LFT. Did have steatosis on CT, could be related to COVID-19 infection as well. We will repeat LFT. Hyperglycemia. May need to be monitored and placed on SSI  Leukopenia. Likely due to her Covid infection. CINDY. On CPAP at home. Details not available. Bronchial asthma. Mild intermittent as per the patient. Presently no wheezing. On Singulair, will add as needed bronchodilator  Obesity. Address when she improves. DVT prophylaxis. On escalated dose of Lovenox at 30 mg SQ every 12 hours    I have examined the patient, reviewed labs, images and medical records. My impression and recommendations are as above. Discussed with patient, nursing. We will follow with you, thank you for the consultation. Discussed with Dr. Madisyn Gomez.       Electronically signed by:  Citlaly Staley MD    8/16/2021    11:17 AM.

## 2021-08-16 NOTE — PROGRESS NOTES
Hospitalist Progress Note      PCP: No primary care provider on file. Date of Admission: 8/12/2021    Chief Complaint:   covid/dehydration     Hospital Course: reviewed      Subjective:  again hypoxic and back on oxygen, ongoing malaise today, now on NRB/HFNC    Medications:  Reviewed    Infusion Medications    sodium chloride       Scheduled Medications    guaiFENesin  600 mg Oral BID    montelukast  10 mg Oral Nightly    pantoprazole  40 mg Oral QAM AC    PARoxetine  40 mg Oral Daily    sodium chloride flush  5-40 mL Intravenous 2 times per day    methylPREDNISolone  40 mg Intravenous Q12H    Vitamin D  2,000 Units Oral Daily    enoxaparin  30 mg Subcutaneous BID     PRN Meds: guaiFENesin-dextromethorphan, benzocaine-menthol, zolpidem, sodium chloride flush, sodium chloride, ondansetron **OR** ondansetron, polyethylene glycol, acetaminophen **OR** acetaminophen, acetaminophen **OR** acetaminophen      Intake/Output Summary (Last 24 hours) at 8/16/2021 0847  Last data filed at 8/16/2021 0123  Gross per 24 hour   Intake    Output 1150 ml   Net -1150 ml       Physical Exam Performed:    /75   Pulse 98   Temp 98.5 °F (36.9 °C) (Oral)   Resp 22   Ht 5' (1.524 m)   Wt 185 lb (83.9 kg)   LMP 04/15/2012   SpO2 93%   BMI 36.13 kg/m²     General appearance:  no distress today, appears stated age and cooperative. remains more awake  HEENT:  Normal cephalic, atraumatic without obvious deformity. Pupils equal, round, and reactive to light.  Extra ocular muscles intact. Conjunctivae/corneas clear. Neck: Supple, with full range of motion. No jugular venous distention. Trachea midline. Respiratory:  inc'd respiratory effort. Clear to auscultation, bilaterally without Wheezes/Rhonchi. Mild bibas rales  Cardiovascular:  Regular rate and rhythm with normal S1/S2 without murmurs, rubs or gallops. Abdomen: Soft, non-tender, non-distended with normal bowel sounds.   Musculoskeletal:  No clubbing, cyanosis or edema bilaterally.  Full range of motion without deformity. Skin: Skin color, texture, turgor normal.  No rashes or lesions. Neurologic:  Neurovascularly intact without any focal sensory/motor deficits. Cranial nerves: II-XII intact, grossly non-focal.  Psychiatric:  Alert and oriented, thought content appropriate, normal insight  Capillary Refill: Brisk,3 seconds, normal  Peripheral Pulses: +2 palpable, equal bilaterally     Labs:   No results for input(s): WBC, HGB, HCT, PLT in the last 72 hours. No results for input(s): NA, K, CL, CO2, BUN, CREATININE, CALCIUM, PHOS in the last 72 hours. Invalid input(s): MAGNES  No results for input(s): AST, ALT, BILIDIR, BILITOT, ALKPHOS in the last 72 hours. No results for input(s): INR in the last 72 hours. No results for input(s): Gertrude Radha in the last 72 hours. Urinalysis:      Lab Results   Component Value Date    NITRU Negative 08/12/2021    WBCUA 10-20 08/12/2021    BACTERIA 1+ 08/12/2021    RBCUA 3-4 08/12/2021    BLOODU TRACE-INTACT 08/12/2021    SPECGRAV >=1.030 08/12/2021    GLUCOSEU Negative 08/12/2021       Radiology:  CT Head WO Contrast   Final Result   No acute intracranial abnormality. CT ABDOMEN PELVIS W IV CONTRAST Additional Contrast? None   Final Result   1. Mucosal thickening and enhancement of the terminal ileum, proximal colon   and sigmoid colon/rectum. Pattern is nonspecific and suggests underlying   enterocolitis. Infectious or inflammatory etiologies may be considered. 2. No pattern of bowel obstruction. 3. Hepatic steatosis. 4. Airspace changes in the lower chest likely represent underlying viral   pneumonia. XR CHEST PORTABLE   Final Result   Perihilar opacities could represent COVID pneumonia given indication. Correlate with presentation to exclude superimposed congestive heart failure.                  Assessment/Plan:    Active Hospital Problems    Diagnosis     COVID-19 [U07.1] Sepsis- due to covid, with tachypnea/tachycardia and +rapid test, pt was recently diagnosed on 8/11  -supportive care  -ivfs given  -lactate was wnl  -no abx given viral etiology     Acute encephalopathy- seems improved with ivfs, due to ?covid, CThead no acute abn  -tele  -neurochecks     Acute resp failure- with sats 88% ra in ER per staff, likely from covid, pt is unvaccinated  -iv solumedrol q12h ordered  -supportive care  -tele  -ID consulted, apprec recs, Per ID note( Date of symptom onset 8/7/21, Date of diagnosis and admission 8/13/21 (date of outpt +test unclear))  - started on Actemra on 8/16   -pulm consulted given worsening    N/v/abd pain/diarrhea- likely from covid, CTa/p done(suggestive of enterocolitis, no obstruction, noted hepatiac steatosis)  -continue supportive care  - cdiff negative  -ua was abn and ucx > 50k mixed mike, no UTI at this time     Asthma- on home singulair     Anxiety-continued paxil        DVT Prophylaxis: lovenox bid  Diet: ADULT DIET;  Regular  Code Status: Full Code       PT/OT Eval Status: not ordered     Dispo - pending improvement, off oxygen, apprec ID recs, pulm consulted    Heather Murry MD

## 2021-08-16 NOTE — PROGRESS NOTES
Secure message placed to Ilya Quiles NP:    ABHISHEK: pt here with COVID, was on 3.5L at start of shift, now on 10L high flow. Sat now 94%. placed on continuous O2. Pt on Solumedrol Q12. NP states: \"Ask patient to prone as often as they can\"    Will continue to encourage pt to prone.

## 2021-08-16 NOTE — PROGRESS NOTES
Pt O2 continuing to drop to 85-88%. 15L high flow + non-rebreather placed. O2 now 92%. MD made aware.

## 2021-08-17 ENCOUNTER — APPOINTMENT (OUTPATIENT)
Dept: GENERAL RADIOLOGY | Age: 61
DRG: 870 | End: 2021-08-17

## 2021-08-17 LAB
ANION GAP SERPL CALCULATED.3IONS-SCNC: 12 MMOL/L (ref 3–16)
BASE EXCESS ARTERIAL: -1.4 MMOL/L (ref -3–3)
BASE EXCESS ARTERIAL: -2 (ref -3–3)
BASE EXCESS ARTERIAL: 0 (ref -3–3)
BASOPHILS ABSOLUTE: 0 K/UL (ref 0–0.2)
BASOPHILS RELATIVE PERCENT: 0.2 %
BUN BLDV-MCNC: 30 MG/DL (ref 7–20)
C-REACTIVE PROTEIN: 34.8 MG/L (ref 0–5.1)
CALCIUM SERPL-MCNC: 8 MG/DL (ref 8.3–10.6)
CARBOXYHEMOGLOBIN ARTERIAL: 0.3 % (ref 0–1.5)
CHLORIDE BLD-SCNC: 104 MMOL/L (ref 99–110)
CO2: 24 MMOL/L (ref 21–32)
CREAT SERPL-MCNC: 1 MG/DL (ref 0.6–1.2)
EOSINOPHILS ABSOLUTE: 0 K/UL (ref 0–0.6)
EOSINOPHILS RELATIVE PERCENT: 0 %
FERRITIN: 912.8 NG/ML (ref 15–150)
GFR AFRICAN AMERICAN: >60
GFR NON-AFRICAN AMERICAN: 56
GLUCOSE BLD-MCNC: 120 MG/DL (ref 70–99)
HCO3 ARTERIAL: 23.4 MMOL/L (ref 21–29)
HCO3 ARTERIAL: 25.1 MMOL/L (ref 21–29)
HCO3 ARTERIAL: 25.8 MMOL/L (ref 21–29)
HCT VFR BLD CALC: 34.8 % (ref 36–48)
HEMOGLOBIN, ART, EXTENDED: 13.4 G/DL (ref 12–16)
HEMOGLOBIN: 12.2 G/DL (ref 12–16)
LYMPHOCYTES ABSOLUTE: 0.4 K/UL (ref 1–5.1)
LYMPHOCYTES RELATIVE PERCENT: 12 %
MCH RBC QN AUTO: 31.7 PG (ref 26–34)
MCHC RBC AUTO-ENTMCNC: 34.9 G/DL (ref 31–36)
MCV RBC AUTO: 90.7 FL (ref 80–100)
METHEMOGLOBIN ARTERIAL: 0.5 %
MONOCYTES ABSOLUTE: 0.2 K/UL (ref 0–1.3)
MONOCYTES RELATIVE PERCENT: 7 %
NEUTROPHILS ABSOLUTE: 2.8 K/UL (ref 1.7–7.7)
NEUTROPHILS RELATIVE PERCENT: 80.8 %
O2 SAT, ARTERIAL: 87 % (ref 93–100)
O2 SAT, ARTERIAL: 94 % (ref 93–100)
O2 SAT, ARTERIAL: 99 %
O2 THERAPY: ABNORMAL
PCO2 ARTERIAL: 40 MM HG (ref 35–45)
PCO2 ARTERIAL: 47.7 MM HG (ref 35–45)
PCO2 ARTERIAL: 49 MMHG (ref 35–45)
PDW BLD-RTO: 13.6 % (ref 12.4–15.4)
PERFORMED ON: ABNORMAL
PERFORMED ON: ABNORMAL
PH ARTERIAL: 7.33 (ref 7.35–7.45)
PH ARTERIAL: 7.34 (ref 7.35–7.45)
PH ARTERIAL: 7.38 (ref 7.35–7.45)
PLATELET # BLD: 211 K/UL (ref 135–450)
PMV BLD AUTO: 8.5 FL (ref 5–10.5)
PO2 ARTERIAL: 298.9 MMHG (ref 75–108)
PO2 ARTERIAL: 54.2 MM HG (ref 75–108)
PO2 ARTERIAL: 77.4 MM HG (ref 75–108)
POC FIO2: 100
POC SAMPLE TYPE: ABNORMAL
POC SAMPLE TYPE: ABNORMAL
POTASSIUM SERPL-SCNC: 3.4 MMOL/L (ref 3.5–5.1)
RBC # BLD: 3.84 M/UL (ref 4–5.2)
SODIUM BLD-SCNC: 140 MMOL/L (ref 136–145)
TCO2 ARTERIAL: 25 MMOL/L
TCO2 ARTERIAL: 26.6 MMOL/L
TCO2 ARTERIAL: 27 MMOL/L
WBC # BLD: 3.5 K/UL (ref 4–11)

## 2021-08-17 PROCEDURE — 94002 VENT MGMT INPAT INIT DAY: CPT

## 2021-08-17 PROCEDURE — 51702 INSERT TEMP BLADDER CATH: CPT

## 2021-08-17 PROCEDURE — APPNB60 APP NON BILLABLE TIME 46-60 MINS: Performed by: NURSE PRACTITIONER

## 2021-08-17 PROCEDURE — 74018 RADEX ABDOMEN 1 VIEW: CPT

## 2021-08-17 PROCEDURE — 94660 CPAP INITIATION&MGMT: CPT

## 2021-08-17 PROCEDURE — 6360000002 HC RX W HCPCS: Performed by: NURSE PRACTITIONER

## 2021-08-17 PROCEDURE — 86140 C-REACTIVE PROTEIN: CPT

## 2021-08-17 PROCEDURE — 31500 INSERT EMERGENCY AIRWAY: CPT

## 2021-08-17 PROCEDURE — 2000000000 HC ICU R&B

## 2021-08-17 PROCEDURE — 6360000002 HC RX W HCPCS: Performed by: INTERNAL MEDICINE

## 2021-08-17 PROCEDURE — 71045 X-RAY EXAM CHEST 1 VIEW: CPT

## 2021-08-17 PROCEDURE — 6360000002 HC RX W HCPCS

## 2021-08-17 PROCEDURE — 82803 BLOOD GASES ANY COMBINATION: CPT

## 2021-08-17 PROCEDURE — 94761 N-INVAS EAR/PLS OXIMETRY MLT: CPT

## 2021-08-17 PROCEDURE — 85025 COMPLETE CBC W/AUTO DIFF WBC: CPT

## 2021-08-17 PROCEDURE — 0BH17EZ INSERTION OF ENDOTRACHEAL AIRWAY INTO TRACHEA, VIA NATURAL OR ARTIFICIAL OPENING: ICD-10-PCS | Performed by: INTERNAL MEDICINE

## 2021-08-17 PROCEDURE — 6370000000 HC RX 637 (ALT 250 FOR IP): Performed by: INTERNAL MEDICINE

## 2021-08-17 PROCEDURE — 36620 INSERTION CATHETER ARTERY: CPT

## 2021-08-17 PROCEDURE — 02HV33Z INSERTION OF INFUSION DEVICE INTO SUPERIOR VENA CAVA, PERCUTANEOUS APPROACH: ICD-10-PCS | Performed by: INTERNAL MEDICINE

## 2021-08-17 PROCEDURE — 2580000003 HC RX 258: Performed by: INTERNAL MEDICINE

## 2021-08-17 PROCEDURE — 2500000003 HC RX 250 WO HCPCS: Performed by: NURSE PRACTITIONER

## 2021-08-17 PROCEDURE — 2700000000 HC OXYGEN THERAPY PER DAY

## 2021-08-17 PROCEDURE — 36556 INSERT NON-TUNNEL CV CATH: CPT

## 2021-08-17 PROCEDURE — 6370000000 HC RX 637 (ALT 250 FOR IP): Performed by: NURSE PRACTITIONER

## 2021-08-17 PROCEDURE — 2580000003 HC RX 258: Performed by: NURSE PRACTITIONER

## 2021-08-17 PROCEDURE — 36556 INSERT NON-TUNNEL CV CATH: CPT | Performed by: NURSE PRACTITIONER

## 2021-08-17 PROCEDURE — 80048 BASIC METABOLIC PNL TOTAL CA: CPT

## 2021-08-17 PROCEDURE — 36600 WITHDRAWAL OF ARTERIAL BLOOD: CPT

## 2021-08-17 PROCEDURE — 5A1955Z RESPIRATORY VENTILATION, GREATER THAN 96 CONSECUTIVE HOURS: ICD-10-PCS | Performed by: INTERNAL MEDICINE

## 2021-08-17 PROCEDURE — 99291 CRITICAL CARE FIRST HOUR: CPT | Performed by: INTERNAL MEDICINE

## 2021-08-17 PROCEDURE — 82728 ASSAY OF FERRITIN: CPT

## 2021-08-17 PROCEDURE — 36592 COLLECT BLOOD FROM PICC: CPT

## 2021-08-17 PROCEDURE — 31500 INSERT EMERGENCY AIRWAY: CPT | Performed by: NURSE PRACTITIONER

## 2021-08-17 RX ORDER — PROPOFOL 10 MG/ML
5-30 INJECTION, EMULSION INTRAVENOUS
Status: DISCONTINUED | OUTPATIENT
Start: 2021-08-17 | End: 2021-08-23

## 2021-08-17 RX ORDER — CHLORHEXIDINE GLUCONATE 0.12 MG/ML
15 RINSE ORAL 2 TIMES DAILY
Status: DISCONTINUED | OUTPATIENT
Start: 2021-08-17 | End: 2021-08-25

## 2021-08-17 RX ORDER — CARBOXYMETHYLCELLULOSE SODIUM 10 MG/ML
1 GEL OPHTHALMIC EVERY 4 HOURS
Status: DISCONTINUED | OUTPATIENT
Start: 2021-08-17 | End: 2021-08-25

## 2021-08-17 RX ORDER — PANTOPRAZOLE SODIUM 40 MG/10ML
40 INJECTION, POWDER, LYOPHILIZED, FOR SOLUTION INTRAVENOUS DAILY
Status: DISCONTINUED | OUTPATIENT
Start: 2021-08-18 | End: 2021-08-25

## 2021-08-17 RX ORDER — PROPOFOL 10 MG/ML
INJECTION, EMULSION INTRAVENOUS
Status: COMPLETED
Start: 2021-08-17 | End: 2021-08-17

## 2021-08-17 RX ORDER — LORAZEPAM 2 MG/ML
0.5 INJECTION INTRAMUSCULAR ONCE
Status: COMPLETED | OUTPATIENT
Start: 2021-08-17 | End: 2021-08-17

## 2021-08-17 RX ORDER — POTASSIUM CHLORIDE 29.8 MG/ML
20 INJECTION INTRAVENOUS PRN
Status: DISCONTINUED | OUTPATIENT
Start: 2021-08-17 | End: 2021-08-25

## 2021-08-17 RX ADMIN — PROPOFOL 15 MCG/KG/MIN: 10 INJECTION, EMULSION INTRAVENOUS at 12:03

## 2021-08-17 RX ADMIN — METHYLPREDNISOLONE SODIUM SUCCINATE 40 MG: 40 INJECTION, POWDER, FOR SOLUTION INTRAMUSCULAR; INTRAVENOUS at 15:08

## 2021-08-17 RX ADMIN — METHYLPREDNISOLONE SODIUM SUCCINATE 40 MG: 40 INJECTION, POWDER, FOR SOLUTION INTRAMUSCULAR; INTRAVENOUS at 20:20

## 2021-08-17 RX ADMIN — GUAIFENESIN 600 MG: 600 TABLET, EXTENDED RELEASE ORAL at 20:20

## 2021-08-17 RX ADMIN — MONTELUKAST SODIUM 10 MG: 10 TABLET ORAL at 20:20

## 2021-08-17 RX ADMIN — CARBOXYMETHYLCELLULOSE SODIUM 1 DROP: 10 GEL OPHTHALMIC at 20:20

## 2021-08-17 RX ADMIN — ONDANSETRON 4 MG: 2 INJECTION INTRAMUSCULAR; INTRAVENOUS at 01:14

## 2021-08-17 RX ADMIN — LORAZEPAM 0.5 MG: 2 INJECTION INTRAMUSCULAR; INTRAVENOUS at 04:41

## 2021-08-17 RX ADMIN — CARBOXYMETHYLCELLULOSE SODIUM 1 DROP: 10 GEL OPHTHALMIC at 15:00

## 2021-08-17 RX ADMIN — Medication 15 ML: at 14:00

## 2021-08-17 RX ADMIN — FENTANYL CITRATE 50 MCG/HR: 0.05 INJECTION, SOLUTION INTRAMUSCULAR; INTRAVENOUS at 12:34

## 2021-08-17 RX ADMIN — POTASSIUM CHLORIDE 20 MEQ: 29.8 INJECTION, SOLUTION INTRAVENOUS at 21:01

## 2021-08-17 RX ADMIN — SODIUM CHLORIDE, PRESERVATIVE FREE 10 ML: 5 INJECTION INTRAVENOUS at 20:20

## 2021-08-17 RX ADMIN — CISATRACURIUM BESYLATE 1 MCG/KG/MIN: 10 INJECTION INTRAVENOUS at 13:58

## 2021-08-17 RX ADMIN — Medication 15 ML: at 20:49

## 2021-08-17 RX ADMIN — SODIUM CHLORIDE, PRESERVATIVE FREE 10 ML: 5 INJECTION INTRAVENOUS at 10:20

## 2021-08-17 RX ADMIN — ENOXAPARIN SODIUM 30 MG: 30 INJECTION SUBCUTANEOUS at 20:20

## 2021-08-17 RX ADMIN — FENTANYL CITRATE 100 MCG/HR: 0.05 INJECTION, SOLUTION INTRAMUSCULAR; INTRAVENOUS at 22:15

## 2021-08-17 RX ADMIN — CARBOXYMETHYLCELLULOSE SODIUM 1 DROP: 10 GEL OPHTHALMIC at 23:05

## 2021-08-17 RX ADMIN — POTASSIUM CHLORIDE 20 MEQ: 29.8 INJECTION, SOLUTION INTRAVENOUS at 22:17

## 2021-08-17 RX ADMIN — DEXTROSE MONOHYDRATE 2 MCG/MIN: 50 INJECTION, SOLUTION INTRAVENOUS at 15:00

## 2021-08-17 RX ADMIN — PROPOFOL 30 MCG/KG/MIN: 10 INJECTION, EMULSION INTRAVENOUS at 20:11

## 2021-08-17 RX ADMIN — Medication 10 ML: at 15:08

## 2021-08-17 ASSESSMENT — PULMONARY FUNCTION TESTS
PIF_VALUE: 31
PIF_VALUE: 34
PIF_VALUE: 32
PIF_VALUE: 35
PIF_VALUE: 34
PIF_VALUE: 34
PIF_VALUE: 35
PIF_VALUE: 32
PIF_VALUE: 31
PIF_VALUE: 31
PIF_VALUE: 34
PIF_VALUE: 35
PIF_VALUE: 29
PIF_VALUE: 32
PIF_VALUE: 34
PIF_VALUE: 34
PIF_VALUE: 33
PIF_VALUE: 32
PIF_VALUE: 34

## 2021-08-17 ASSESSMENT — PAIN SCALES - GENERAL
PAINLEVEL_OUTOF10: 0
PAINLEVEL_OUTOF10: 0
PAINLEVEL_OUTOF10: 4

## 2021-08-17 NOTE — PROGRESS NOTES
Hospitalist Progress Note      PCP: No primary care provider on file. Date of Admission: 8/12/2021    Chief Complaint:   covid/dehydration     Hospital Course: reviewed     Subjective: transferred to icu given worsening resp status, now intubated       Medications:  Reviewed    Infusion Medications    propofol 20 mcg/kg/min (08/17/21 1400)    fentaNYL (SUBLIMAZE) infusion 75 mcg/hr (08/17/21 1340)    cisatracurium (NIMBEX) infusion 1 mcg/kg/min (08/17/21 1358)    norepinephrine      sodium chloride       Scheduled Medications    [START ON 8/18/2021] pantoprazole  40 mg Intravenous Daily    carboxymethylcellulose PF  1 drop Both Eyes Q4H    chlorhexidine  15 mL Mouth/Throat BID    guaiFENesin  600 mg Oral BID    montelukast  10 mg Oral Nightly    PARoxetine  40 mg Oral Daily    sodium chloride flush  5-40 mL Intravenous 2 times per day    methylPREDNISolone  40 mg Intravenous Q12H    Vitamin D  2,000 Units Oral Daily    enoxaparin  30 mg Subcutaneous BID     PRN Meds: guaiFENesin-dextromethorphan, benzocaine-menthol, zolpidem, sodium chloride flush, sodium chloride, ondansetron **OR** ondansetron, polyethylene glycol, acetaminophen **OR** acetaminophen      Intake/Output Summary (Last 24 hours) at 8/17/2021 1412  Last data filed at 8/17/2021 1313  Gross per 24 hour   Intake 340 ml   Output 1350 ml   Net -1010 ml       Physical Exam Performed:    BP (!) 103/92   Pulse 95   Temp 99.3 °F (37.4 °C) (Bladder)   Resp 22   Ht 5' (1.524 m)   Wt 185 lb (83.9 kg)   LMP 04/15/2012   SpO2 (P) 97%   BMI 36.13 kg/m²     General appearance:  no distress today, appears stated age and cooperative. intubated  HEENT:  Normal cephalic, atraumatic without obvious deformity. Pupils equal, round, and reactive to light.  Extra ocular muscles intact. Conjunctivae/corneas clear. Neck: Supple, with full range of motion. No jugular venous distention. Trachea midline. Respiratory:  inc'd respiratory effort. Clear to auscultation, bilaterally without Wheezes/Rhonchi. Mild bibas rales  Cardiovascular:  Regular rate and rhythm with normal S1/S2 without murmurs, rubs or gallops. Abdomen: Soft, non-tender, non-distended with normal bowel sounds. Musculoskeletal:  No clubbing, cyanosis or edema bilaterally.  Full range of motion without deformity. Skin: Skin color, texture, turgor normal.  No rashes or lesions. Neurologic:  Neurovascularly intact without any focal sensory/motor deficits. Cranial nerves: II-XII intact, grossly non-focal.  Psychiatric:  Alert and oriented x0, sedated  Capillary Refill: Brisk,3 seconds, normal  Peripheral Pulses: +2 palpable, equal bilaterally       Labs:   Recent Labs     08/16/21  1309   WBC 3.2*   HGB 12.6   HCT 36.2        Recent Labs     08/16/21  1309      K 3.4*      CO2 23   BUN 17   CREATININE 0.9   CALCIUM 8.3     Recent Labs     08/16/21  1309   AST 50*   ALT 43*   BILITOT 0.4   ALKPHOS 58     No results for input(s): INR in the last 72 hours. No results for input(s): Gene Beery in the last 72 hours. Urinalysis:      Lab Results   Component Value Date    NITRU Negative 08/12/2021    WBCUA 10-20 08/12/2021    BACTERIA 1+ 08/12/2021    RBCUA 3-4 08/12/2021    BLOODU TRACE-INTACT 08/12/2021    SPECGRAV >=1.030 08/12/2021    GLUCOSEU Negative 08/12/2021       Radiology:  XR CHEST PORTABLE   Final Result   Diffuse bilateral airspace disease. Aeration is improved compared to prior   study. XR CHEST PORTABLE   Final Result   Significant worsening in appearance of bilateral pneumonia         CT Head WO Contrast   Final Result   No acute intracranial abnormality. CT ABDOMEN PELVIS W IV CONTRAST Additional Contrast? None   Final Result   1. Mucosal thickening and enhancement of the terminal ileum, proximal colon   and sigmoid colon/rectum. Pattern is nonspecific and suggests underlying   enterocolitis.   Infectious or inflammatory etiologies may be considered. 2. No pattern of bowel obstruction. 3. Hepatic steatosis. 4. Airspace changes in the lower chest likely represent underlying viral   pneumonia. XR CHEST PORTABLE   Final Result   Perihilar opacities could represent COVID pneumonia given indication. Correlate with presentation to exclude superimposed congestive heart failure.          XR CHEST PORTABLE    (Results Pending)   XR ABDOMEN FOR NG/OG/NE TUBE PLACEMENT    (Results Pending)           Assessment/Plan:    Active Hospital Problems    Diagnosis     Acute respiratory failure with hypoxia (HCC) [J96.01]     Altered mental status [R41.82]     Mild intermittent asthma without complication [C46.83]     CINDY on CPAP [G47.33, Z99.89]     Elevated transaminase level [R74.01]     Hepatic steatosis [K76.0]     Obesity (BMI 35.0-39.9 without comorbidity) [E66.9]     COVID-19 [U07.1]        Sepsis- due to covid, with tachypnea/tachycardia and +rapid test, pt was recently diagnosed on 8/11  -supportive care  -ivfs given  -lactate was wnl  -no abx given viral etiology     Acute encephalopathy- seemed to have improved with ivfs, due to ?covid, CThead no acute abn  -tele  -neurochecks     Acute resp failure- with sats 88% ra in ER per staff, likely from covid, pt is unvaccinated  -iv solumedrol q12h ordered  -supportive care  -tele  -ID consulted, apprec recs, Per ID note( Date of symptom onset 8/7/21, Date of diagnosis and admission 8/13/21 (date of outpt +test unclear))  - started on Actemra on 8/16   -pulm consulted given worsening   -intubated 8/17    N/v/abd pain/diarrhea- likely from covid, CTa/p done(suggestive of enterocolitis, no obstruction, noted hepatiac steatosis)  -continue supportive care  - cdiff negative  -ua was abn and ucx > 50k mixed mike, no UTI at this time     Asthma- on home singulair     Anxiety-continued paxil        DVT Prophylaxis: lovenox bid  Diet: Diet NPO  Code Status: Full Code    PT/OT Eval Status: not

## 2021-08-17 NOTE — PROGRESS NOTES
08/17/21 0758   Oxygen Therapy/Pulse Ox   FiO2  100 %   Resp (!) 40   Blood Gas  Performed? Yes   $ABG $ABG   Bull's Test #1 Pos   Site #1 Left Radial   Site Prepped #1 Yes   Number of Attempts #1 1   Pressure Held #1 Yes   Complications #1 None   Post-procedure #1 Standard   Specimen Status #1 Point of care   How Tolerated?  Tolerated well

## 2021-08-17 NOTE — FLOWSHEET NOTE
Responded to rapid response, Pt busy with care, no family present.  offered silent prayer outside room.  Will follow up at a later time

## 2021-08-17 NOTE — FLOWSHEET NOTE
Bedside report given by Sarath Olivo RN, Skin assessment completed. Pt was moved to C4 after RR this AM between 0700 and 0800 on BiPAP 100% FiO2. Pt was not making great improvement and had increased WOB, pulmonology made the decision to bring pt to ICU to be intubated. Planning to place CVC, ART line, and prone pt this afternoon. Shift assessment completed and documented; see flowsheets for details and vent settings. Pt resting in bed, VSS, Lines checked and verified, alarms on and audible. Repositioned patient, sacral Mepilex in place, call light within reach, will continue to closely monitor. Recent Labs     08/17/21  1519   WBC 3.5*   HGB 12.2   HCT 34.8*   MCV 90.7        Recent Labs     08/16/21  1309 08/16/21  1309 08/17/21  1519      < > 140   K 3.4*  --  3.4*      < > 104   CO2 23   < > 24   GLUCOSE 137*   < > 120*   MG 2.30  --   --    BUN 17   < > 30*   CREATININE 0.9   < > 1.0   CALCIUM 8.3   < > 8.0*   LABGLOM >60   < > 56*   GFRAA >60   < > >60    < > = values in this interval not displayed. Mio Henao RN 8/17/2021 7:58 PM  Estimated Creatinine Clearance: 58 mL/min (based on SCr of 1 mg/dL). DVT Prophylaxis: enoxaparin (Lovenox) 40 mg SQ Once daily.     GI Prophylaxis: None Ordered

## 2021-08-17 NOTE — PROGRESS NOTES
08/17/21 0800   Oxygen Therapy/Pulse Ox   O2 Therapy Oxygen   $Oxygen $Daily Charge   O2 Device PAP (positive airway pressure)   FiO2  100 %   Resp (!) 40   SpO2 91 %   $Pulse Oximeter $Spot check (multiple/continuous)

## 2021-08-17 NOTE — PROGRESS NOTES
Pulmonary & Critical Care Medicine ICU Progress Note      Reason for visit: Acute respiratory failure, severe COVID-19 pneumonia. Past history of asthma, anxiety, iron deficiency anemia. Events of Last 24 hours: The patient has progressively worsened, has remained afebrile and hemodynamically stable. SaO2 dropping into the 80s in spite of high flow nasal cannula, Vapotherm and BiPAP trial with FiO2 100%. Respiratory rate in the 40s, on BiPAP 12/6, did not drop below the high 30s. Underwent ABG from RT. Invasive Lines:   CVC RIJ 8/17  Art Line 8/17        MV: 8/17/2021    Recent Labs     08/17/21  0744 08/17/21  1247   PHART 7.376 7.340*   RUU4HMQ 40.0 47.7*   PO2ART 54.2* 77.4       MV Settings:  Vent Mode: AC/VC Rate Set: 20 bmp/Vt Ordered: 400 mL/ [email or web address])    IV:   propofol 25 mcg/kg/min (08/17/21 1630)    fentaNYL (SUBLIMAZE) infusion 100 mcg/hr (08/17/21 1630)    cisatracurium (NIMBEX) infusion 1.5 mcg/kg/min (08/17/21 1630)    norepinephrine 2 mcg/min (08/17/21 1500)    sodium chloride         Vitals:  BP (!) 113/58   Pulse 72   Temp 98.1 °F (36.7 °C) (Bladder)   Resp 20   Ht 5' (1.524 m)   Wt 185 lb (83.9 kg)   LMP 04/15/2012   SpO2 100%   BMI 36.13 kg/m²          Intake/Output Summary (Last 24 hours) at 8/17/2021 1827  Last data filed at 8/17/2021 1313  Gross per 24 hour   Intake    Output 650 ml   Net -650 ml       EXAM:  Due to the current efforts to prevent transmission of COVID-19 and also the need to preserve PPE for other caregivers, a face-to-face encounter with the patient was not performed. That being said, all relevant records and diagnostic tests were reviewed, including laboratory results and imaging. Please reference any relevant documentation elsewhere. Care will be coordinated with the primary service.       Medications:  Scheduled Meds:   [START ON 8/18/2021] pantoprazole  40 mg Intravenous Daily    carboxymethylcellulose PF  1 drop Both Eyes Q4H    chlorhexidine  15 mL Mouth/Throat BID    guaiFENesin  600 mg Oral BID    montelukast  10 mg Oral Nightly    PARoxetine  40 mg Oral Daily    sodium chloride flush  5-40 mL Intravenous 2 times per day    methylPREDNISolone  40 mg Intravenous Q12H    Vitamin D  2,000 Units Oral Daily    enoxaparin  30 mg Subcutaneous BID       PRN Meds:  potassium chloride, guaiFENesin-dextromethorphan, benzocaine-menthol, zolpidem, sodium chloride flush, sodium chloride, ondansetron **OR** ondansetron, polyethylene glycol, acetaminophen **OR** acetaminophen    Results:  CBC:   Recent Labs     08/16/21  1309 08/17/21  1519   WBC 3.2* 3.5*   HGB 12.6 12.2   HCT 36.2 34.8*   MCV 90.2 90.7    211     BMP:   Recent Labs     08/16/21  1309 08/17/21  1519    140   K 3.4* 3.4*    104   CO2 23 24   BUN 17 30*   CREATININE 0.9 1.0     LIVER PROFILE:   Recent Labs     08/16/21  1309   AST 50*   ALT 43*   BILITOT 0.4   ALKPHOS 58     Cultures:      Films:  CXR reviewed by me       Assessment and plan:  Acute respiratory failure, hypoxemic. Significant increase in oxygen requirement, failing high flow oxygen as well as BiPAP. I had discussed with her about intubation and mechanical ventilation if the need arises, she was agreeable. The patient was moved to ICU, discussed with Dolph Prader, agreed to intubation. She was intubated, proned. A central line and arterial line placed  Acute COVID-19 pneumonia. Presented with GI symptoms and confusion, those have resolved but her respiratory status has worsened. ID following, received Actemra. On appropriate dose of steroid. Acute encephalopathy. Improved  Abnormal LFT. Did have steatosis on CT, could be related to COVID-19 infection as well. Slightly better on repeat LFT. Hyperglycemia. May need to be monitored and placed on SSI  Leukopenia. Likely due to her Covid infection. Hypokalemia. Replace and monitor  CINDY. On CPAP at home. Details not available. Address when she improves  Bronchial asthma. Mild intermittent as per the patient. Presently no wheezing. On Singulair, added as needed bronchodilator  Obesity. Address when she improves. DVT prophylaxis. On escalated dose of Lovenox at 30 mg SQ every 12 hours      Critical care time spent reviewing labs/films, examining patient, collaborating with other physicians but excluding procedures for life threatening organ failure is 37 minutes.         Electronically signed by:  Xavier Duverney, MD    8/17/2021    6:27 PM.

## 2021-08-17 NOTE — PROCEDURES
Intubation Procedure Note    Indication: impending respiratory failure and hypoxia    Consent: The patient provided verbal consent for this procedure. Medications Used: etomidate intravenously, midazolam intravenously and rocuronium intravenously    Procedure: The patient was placed in the appropriate position. Cricoid pressure was utilized. Intubation was performed by direct laryngoscopy using a laryngoscope and a 7.5 cuffed endotracheal tube. The cuff was then inflated and the tube was secured appropriately at a distance of 23 cm to the dental ridge. Initial confirmation of placement included bilateral breath sounds, an end tidal CO2 detector and adequate pulse oximetry reading. A chest x-ray to verify correct placement of the tube showed appropriate tube position. The patient tolerated the procedure well. Patient with known COVID infection in droplet isolation and full PPE from all staff members utilized. Complications: None    Michelle Costa, APRN-CNP

## 2021-08-17 NOTE — PROGRESS NOTES
08/17/21 1556   Vent Information   Vent Type 980   Vent Mode AC/VC   Vt Ordered 400 mL   Rate Set 20 bmp   Peak Flow 40 L/min   Pressure Support 0 cmH20   FiO2  100 %   SpO2 100 %   SpO2/FiO2 ratio 100   Sensitivity 3   PEEP/CPAP 14   Humidification Source HME   Vent Patient Data   Peak Inspiratory Pressure 34 cmH2O   Mean Airway Pressure 21 cmH20   Rate Measured 20 br/min   Vt Exhaled 403 mL   Minute Volume 8.05 Liters   I:E Ratio 1:1.70   Spontaneous Breathing Trial (SBT) RT Doc   Pulse 66   Additional Respiratory  Assessments   Resp 20   Alarm Settings   High Pressure Alarm 45 cmH2O   Low Minute Volume Alarm 2 L/min   Apnea (secs) 20 secs   High Respiratory Rate 40 br/min   ETT (adult)   Placement Date/Time: 08/17/21 1216   Tube Size: 7.5 mm  Laryngoscope: GlideScope  Blade Size: 4  Location: Oral  Placement Verified By[de-identified] Colorimetric EtCO2 device  Secured at: 23 cm  Placed By: Licensed provider   Secured at 23 cm   Measured From Lips   ET Placement Right   Secured By Commercial tube stuart   Site Condition Dry   Cuff Pressure 30 cm H2O

## 2021-08-17 NOTE — PROGRESS NOTES
Patient arrived from C4 to room 229. Mika Brumfield, CNP at bedside to intubate patient. Patient agreeable to intubation. 1155: 2 mg Versed given  1156: 10 mg Etomidate   1157: 50 mg Rocuronium  1159:  Intubated with a # 7.5 ETT @ 26 cm @ lip

## 2021-08-17 NOTE — PROGRESS NOTES
[address] admitted 8/12/2021  7:13 PM FOR nausea/vomiting and recent dx 8/12 +COVID. Increased oxygen needs overnight, RR called for respiratory status, placed on AVAPS and transferred to PCU. Patient transferred to ICU with declining respiratory status and need for intubation. Past Medical History:   Diagnosis Date    Anxiety     Asthma     COVID-19 08/11/2021    Haemorrhoids in the puerperium, delivered, with mention of postpartum complication     Iron deficiency     Menorrhagia       Past Surgical History:   Procedure Laterality Date    BREAST REDUCTION SURGERY      LAPAROSCOPY      TONSILLECTOMY      ULNA OSTEOTOMY          BP (!) 103/92   Pulse 95   Temp 99.3 °F (37.4 °C) (Bladder)   Resp 22   Ht 5' (1.524 m)   Wt 185 lb (83.9 kg)   LMP 04/15/2012   SpO2 97%   BMI 36.13 kg/m²     CVC Triple Lumen 08/17/21 Right Internal jugular (Active)   Placement Date/Time: 08/17/21 1230   Pre-existing: No  Timeout: Patient;Procedure;Site/Side;Appropriate Equipment; Consent Confirmed;Sterile Technique using full body drape;Site prepped with chlorhexidine;Sterile drsg with biopatch; Availability of Impl. .. ETT (adult) (Active)   Placement Date/Time: 08/17/21 1216   Tube Size: 7.5 mm  Laryngoscope: GlideScope  Blade Size: 4  Location: Oral  Placement Verified By[de-identified] Colorimetric EtCO2 device  Secured at: 23 cm  Placed By: Licensed provider       Arterial Line 08/17/21 Left Radial (Active)   Placement Date/Time: 08/17/21 1230   Allens Test: Yes  Timeout: Patient;Procedure;Site/Side;Appropriate Equipment; Consent Confirmed;Sterile Technique using full body drape;Site prepped with chlorhexidine;Sterile drsg with biopatch; Availability of Impl. .. Plan:  Patient transferred to ICU in need of intubation, patient in agreement, please see procedure note. FiO2 100/14, ABG post intubation: 7.34/47/77/25. Will prone/paralyze per protocol, trend ABG q6  Sedated with propofol/fentanyl.  Levophed for hemodynamic support. Central line, arterial line placed. CXR images reviewed. Continue IV steroids for severe COVID, received toci 8/16. Call placed to patient's significant other, Cheryl Membreno and clinical update provided. Droplet isolation as ordered. Discussed with Chandler San and [address] [place] [address] today. DVT ppx: lovenox  GI ppx: protonix    Michelle Higuera, APRN-CNP    Addendum:  Patient manually proned with RN/RT at 16:30  Keep in prone position for 16 hours with every 2 hour head/arm turns  Serial ABG to wean FiO2, currently 100% PEEP 14  Supine trial tomorrow 8/18 0830

## 2021-08-17 NOTE — PROGRESS NOTES
08/17/21 1214   Vent Information   $Ventilation $Initial Day   Vent Type 980   Vent Mode AC/VC   Vt Ordered 400 mL   Rate Set 20 bmp   Peak Flow 40 L/min   Pressure Support 0 cmH20   FiO2  100 %   SpO2 95 %   SpO2/FiO2 ratio 95   Sensitivity 3   PEEP/CPAP 14   Humidification Source HME   Vent Patient Data   Peak Inspiratory Pressure 32 cmH2O   Mean Airway Pressure 20 cmH20   Rate Measured 20 br/min   Vt Exhaled 404 mL   Minute Volume 8.06 Liters   I:E Ratio 1:1.70   Plateau Pressure 29 SNA25   Static Compliance 21 mL/cmH2O   Cough/Sputum   Sputum How Obtained Oral   Sputum Amount Moderate   Sputum Color Clear   Spontaneous Breathing Trial (SBT) RT Doc   Pulse 108   Breath Sounds   Right Upper Lobe Diminished   Right Middle Lobe Diminished   Right Lower Lobe Crackles   Left Upper Lobe Diminished   Left Lower Lobe Crackles   Additional Respiratory  Assessments   Resp 20   Position Supine   Alarm Settings   High Pressure Alarm 45 cmH2O   Low Minute Volume Alarm 2 L/min   High Respiratory Rate 40 br/min   ETT (adult)   Placement Date/Time: 08/17/21 1216   Tube Size: 7.5 mm  Laryngoscope: GlideScope  Blade Size: 4  Location: Oral  Placement Verified By[de-identified] Colorimetric EtCO2 device  Secured at: 23 cm  Placed By: Licensed provider   $ Intubation emergent  $ Yes   Secured at 23 cm   Measured From Lips   ET Placement Right   Secured By Commercial tube stuart   Site Condition Dry   Cuff Pressure 30 cm H2O     Assisted NP with intubation and placement on mechanical ventilator  Ambu/sx hob

## 2021-08-17 NOTE — PROGRESS NOTES
08/17/21 0758   NIV Type   NIV Started/Stopped On   Equipment Type v60   Mode AVAPS   Mask Type Full face mask   Mask Size Medium   Settings/Measurements   CPAP/EPAP 15 cmH2O   IPAP Min 20 cmH2O   IPAP Max 30 cmH2O   Vt Ordered 500 mL   Rate Ordered 20   Resp (!) 40   Insp Rise Time (%) 2 %   FiO2  100 %   I Time/ I Time % 1 s   Vt Exhaled 495 mL   Minute Volume 15.9 Liters   Mask Leak (lpm) 12 lpm   Comfort Level Fair   Using Accessory Muscles Yes   SpO2 92

## 2021-08-17 NOTE — PROGRESS NOTES
08/17/21 0348   NIV Type   $NIV $Daily Charge   NIV Started/Stopped On   Equipment Type v60   Mode Bilevel   Mask Type Full face mask   Mask Size Medium   Settings/Measurements   IPAP 12 cmH20   CPAP/EPAP 6 cmH2O   Resp 28   FiO2  100 %   Vt Exhaled 465 mL   Mask Leak (lpm) 20 lpm   Comfort Level Fair   Using Accessory Muscles Yes   SpO2 92   Patient Observation   Observations mepilex on nose   Alarm Settings   Alarms On Y   Press Low Alarm 6 cmH2O   High Pressure Alarm 20 cmH2O

## 2021-08-18 PROBLEM — J12.82 2019 NOVEL CORONAVIRUS-INFECTED PNEUMONIA (NCIP): Status: ACTIVE | Noted: 2021-08-13

## 2021-08-18 LAB
ANION GAP SERPL CALCULATED.3IONS-SCNC: 12 MMOL/L (ref 3–16)
BASE EXCESS ARTERIAL: -2.3 MMOL/L (ref -3–3)
BASE EXCESS ARTERIAL: -4.5 MMOL/L (ref -3–3)
BASE EXCESS ARTERIAL: 0 (ref -3–3)
BUN BLDV-MCNC: 31 MG/DL (ref 7–20)
CALCIUM SERPL-MCNC: 8.2 MG/DL (ref 8.3–10.6)
CARBOXYHEMOGLOBIN ARTERIAL: 0 % (ref 0–1.5)
CARBOXYHEMOGLOBIN ARTERIAL: 0.3 % (ref 0–1.5)
CHLORIDE BLD-SCNC: 106 MMOL/L (ref 99–110)
CO2: 22 MMOL/L (ref 21–32)
CREAT SERPL-MCNC: 0.8 MG/DL (ref 0.6–1.2)
GFR AFRICAN AMERICAN: >60
GFR NON-AFRICAN AMERICAN: >60
GLUCOSE BLD-MCNC: 137 MG/DL (ref 70–99)
GLUCOSE BLD-MCNC: 140 MG/DL (ref 70–99)
GLUCOSE BLD-MCNC: 142 MG/DL (ref 70–99)
GLUCOSE BLD-MCNC: 177 MG/DL (ref 70–99)
GLUCOSE BLD-MCNC: 185 MG/DL (ref 70–99)
HCO3 ARTERIAL: 21.8 MMOL/L (ref 21–29)
HCO3 ARTERIAL: 23.5 MMOL/L (ref 21–29)
HCO3 ARTERIAL: 27.5 MMOL/L (ref 21–29)
HCT VFR BLD CALC: 37.2 % (ref 36–48)
HEMOGLOBIN, ART, EXTENDED: 13.2 G/DL (ref 12–16)
HEMOGLOBIN, ART, EXTENDED: 13.3 G/DL (ref 12–16)
HEMOGLOBIN: 13.2 G/DL (ref 12–16)
MCH RBC QN AUTO: 32.3 PG (ref 26–34)
MCHC RBC AUTO-ENTMCNC: 35.5 G/DL (ref 31–36)
MCV RBC AUTO: 91.1 FL (ref 80–100)
METHEMOGLOBIN ARTERIAL: 0.5 %
METHEMOGLOBIN ARTERIAL: 0.5 %
O2 SAT, ARTERIAL: 87 % (ref 93–100)
O2 SAT, ARTERIAL: 95.9 %
O2 SAT, ARTERIAL: 96.3 %
O2 THERAPY: ABNORMAL
O2 THERAPY: ABNORMAL
PCO2 ARTERIAL: 44.2 MMHG (ref 35–45)
PCO2 ARTERIAL: 45.2 MMHG (ref 35–45)
PCO2 ARTERIAL: 64.7 MM HG (ref 35–45)
PDW BLD-RTO: 13.4 % (ref 12.4–15.4)
PERFORMED ON: ABNORMAL
PH ARTERIAL: 7.24 (ref 7.35–7.45)
PH ARTERIAL: 7.3 (ref 7.35–7.45)
PH ARTERIAL: 7.34 (ref 7.35–7.45)
PLATELET # BLD: 278 K/UL (ref 135–450)
PMV BLD AUTO: 7.9 FL (ref 5–10.5)
PO2 ARTERIAL: 63.3 MM HG (ref 75–108)
PO2 ARTERIAL: 92.8 MMHG (ref 75–108)
PO2 ARTERIAL: 94.1 MMHG (ref 75–108)
POC SAMPLE TYPE: ABNORMAL
POTASSIUM SERPL-SCNC: 4.4 MMOL/L (ref 3.5–5.1)
RBC # BLD: 4.08 M/UL (ref 4–5.2)
SODIUM BLD-SCNC: 140 MMOL/L (ref 136–145)
TCO2 ARTERIAL: 23.2 MMOL/L
TCO2 ARTERIAL: 24.8 MMOL/L
TCO2 ARTERIAL: 30 MMOL/L
TRIGL SERPL-MCNC: 811 MG/DL (ref 0–150)
WBC # BLD: 5 K/UL (ref 4–11)

## 2021-08-18 PROCEDURE — 94003 VENT MGMT INPAT SUBQ DAY: CPT

## 2021-08-18 PROCEDURE — 2700000000 HC OXYGEN THERAPY PER DAY

## 2021-08-18 PROCEDURE — C9113 INJ PANTOPRAZOLE SODIUM, VIA: HCPCS | Performed by: NURSE PRACTITIONER

## 2021-08-18 PROCEDURE — 84478 ASSAY OF TRIGLYCERIDES: CPT

## 2021-08-18 PROCEDURE — 2580000003 HC RX 258: Performed by: INTERNAL MEDICINE

## 2021-08-18 PROCEDURE — 6370000000 HC RX 637 (ALT 250 FOR IP): Performed by: INTERNAL MEDICINE

## 2021-08-18 PROCEDURE — 36620 INSERTION CATHETER ARTERY: CPT

## 2021-08-18 PROCEDURE — 80048 BASIC METABOLIC PNL TOTAL CA: CPT

## 2021-08-18 PROCEDURE — 36620 INSERTION CATHETER ARTERY: CPT | Performed by: NURSE PRACTITIONER

## 2021-08-18 PROCEDURE — 2000000000 HC ICU R&B

## 2021-08-18 PROCEDURE — 6360000002 HC RX W HCPCS: Performed by: INTERNAL MEDICINE

## 2021-08-18 PROCEDURE — 94761 N-INVAS EAR/PLS OXIMETRY MLT: CPT

## 2021-08-18 PROCEDURE — APPNB45 APP NON BILLABLE 31-45 MINUTES: Performed by: NURSE PRACTITIONER

## 2021-08-18 PROCEDURE — 2580000003 HC RX 258: Performed by: NURSE PRACTITIONER

## 2021-08-18 PROCEDURE — 82803 BLOOD GASES ANY COMBINATION: CPT

## 2021-08-18 PROCEDURE — 99291 CRITICAL CARE FIRST HOUR: CPT | Performed by: INTERNAL MEDICINE

## 2021-08-18 PROCEDURE — 6370000000 HC RX 637 (ALT 250 FOR IP): Performed by: NURSE PRACTITIONER

## 2021-08-18 PROCEDURE — 37799 UNLISTED PX VASCULAR SURGERY: CPT

## 2021-08-18 PROCEDURE — 85027 COMPLETE CBC AUTOMATED: CPT

## 2021-08-18 PROCEDURE — 6360000002 HC RX W HCPCS: Performed by: NURSE PRACTITIONER

## 2021-08-18 RX ORDER — SENNA AND DOCUSATE SODIUM 50; 8.6 MG/1; MG/1
2 TABLET, FILM COATED ORAL DAILY
Status: DISCONTINUED | OUTPATIENT
Start: 2021-08-18 | End: 2021-08-24

## 2021-08-18 RX ORDER — NICOTINE POLACRILEX 4 MG
15 LOZENGE BUCCAL PRN
Status: DISCONTINUED | OUTPATIENT
Start: 2021-08-18 | End: 2021-08-25

## 2021-08-18 RX ORDER — DEXTROSE MONOHYDRATE 50 MG/ML
100 INJECTION, SOLUTION INTRAVENOUS PRN
Status: DISCONTINUED | OUTPATIENT
Start: 2021-08-18 | End: 2021-08-25

## 2021-08-18 RX ORDER — DEXTROSE MONOHYDRATE 25 G/50ML
12.5 INJECTION, SOLUTION INTRAVENOUS PRN
Status: DISCONTINUED | OUTPATIENT
Start: 2021-08-18 | End: 2021-08-25

## 2021-08-18 RX ADMIN — Medication 15 ML: at 22:21

## 2021-08-18 RX ADMIN — Medication 15 ML: at 09:36

## 2021-08-18 RX ADMIN — PAROXETINE HYDROCHLORIDE 40 MG: 20 TABLET, FILM COATED ORAL at 09:35

## 2021-08-18 RX ADMIN — PROPOFOL 45 MCG/KG/MIN: 10 INJECTION, EMULSION INTRAVENOUS at 21:21

## 2021-08-18 RX ADMIN — ENOXAPARIN SODIUM 30 MG: 30 INJECTION SUBCUTANEOUS at 09:35

## 2021-08-18 RX ADMIN — CARBOXYMETHYLCELLULOSE SODIUM 1 DROP: 10 GEL OPHTHALMIC at 16:04

## 2021-08-18 RX ADMIN — INSULIN LISPRO 1 UNITS: 100 INJECTION, SOLUTION INTRAVENOUS; SUBCUTANEOUS at 16:05

## 2021-08-18 RX ADMIN — ENOXAPARIN SODIUM 30 MG: 30 INJECTION SUBCUTANEOUS at 22:21

## 2021-08-18 RX ADMIN — CARBOXYMETHYLCELLULOSE SODIUM 1 DROP: 10 GEL OPHTHALMIC at 22:21

## 2021-08-18 RX ADMIN — SODIUM CHLORIDE, PRESERVATIVE FREE 10 ML: 5 INJECTION INTRAVENOUS at 22:22

## 2021-08-18 RX ADMIN — METHYLPREDNISOLONE SODIUM SUCCINATE 40 MG: 40 INJECTION, POWDER, FOR SOLUTION INTRAMUSCULAR; INTRAVENOUS at 09:34

## 2021-08-18 RX ADMIN — FENTANYL CITRATE 200 MCG/HR: 0.05 INJECTION, SOLUTION INTRAMUSCULAR; INTRAVENOUS at 23:45

## 2021-08-18 RX ADMIN — PANTOPRAZOLE SODIUM 40 MG: 40 INJECTION, POWDER, FOR SOLUTION INTRAVENOUS at 09:34

## 2021-08-18 RX ADMIN — DOCUSATE SODIUM 50 MG AND SENNOSIDES 8.6 MG 2 TABLET: 8.6; 5 TABLET, FILM COATED ORAL at 09:34

## 2021-08-18 RX ADMIN — MUPIROCIN: 20 OINTMENT TOPICAL at 12:15

## 2021-08-18 RX ADMIN — MONTELUKAST SODIUM 10 MG: 10 TABLET ORAL at 22:21

## 2021-08-18 RX ADMIN — INSULIN LISPRO 1 UNITS: 100 INJECTION, SOLUTION INTRAVENOUS; SUBCUTANEOUS at 12:16

## 2021-08-18 RX ADMIN — PROPOFOL 30 MCG/KG/MIN: 10 INJECTION, EMULSION INTRAVENOUS at 03:13

## 2021-08-18 RX ADMIN — CARBOXYMETHYLCELLULOSE SODIUM 1 DROP: 10 GEL OPHTHALMIC at 09:34

## 2021-08-18 RX ADMIN — PROPOFOL 45 MCG/KG/MIN: 10 INJECTION, EMULSION INTRAVENOUS at 15:58

## 2021-08-18 RX ADMIN — METHYLPREDNISOLONE SODIUM SUCCINATE 40 MG: 40 INJECTION, POWDER, FOR SOLUTION INTRAMUSCULAR; INTRAVENOUS at 22:21

## 2021-08-18 RX ADMIN — CARBOXYMETHYLCELLULOSE SODIUM 1 DROP: 10 GEL OPHTHALMIC at 04:57

## 2021-08-18 RX ADMIN — FENTANYL CITRATE 125 MCG/HR: 0.05 INJECTION, SOLUTION INTRAMUSCULAR; INTRAVENOUS at 17:51

## 2021-08-18 RX ADMIN — PROPOFOL 25 MCG/KG/MIN: 10 INJECTION, EMULSION INTRAVENOUS at 09:33

## 2021-08-18 RX ADMIN — INSULIN LISPRO 1 UNITS: 100 INJECTION, SOLUTION INTRAVENOUS; SUBCUTANEOUS at 09:43

## 2021-08-18 RX ADMIN — CARBOXYMETHYLCELLULOSE SODIUM 1 DROP: 10 GEL OPHTHALMIC at 12:15

## 2021-08-18 RX ADMIN — Medication 2000 UNITS: at 09:35

## 2021-08-18 RX ADMIN — FENTANYL CITRATE 100 MCG/HR: 0.05 INJECTION, SOLUTION INTRAMUSCULAR; INTRAVENOUS at 10:18

## 2021-08-18 RX ADMIN — MUPIROCIN: 20 OINTMENT TOPICAL at 22:21

## 2021-08-18 ASSESSMENT — PULMONARY FUNCTION TESTS
PIF_VALUE: 35
PIF_VALUE: 31
PIF_VALUE: 31
PIF_VALUE: 29
PIF_VALUE: 27
PIF_VALUE: 28
PIF_VALUE: 24
PIF_VALUE: 27
PIF_VALUE: 19
PIF_VALUE: 28
PIF_VALUE: 28
PIF_VALUE: 30
PIF_VALUE: 29
PIF_VALUE: 32
PIF_VALUE: 29
PIF_VALUE: 24
PIF_VALUE: 27
PIF_VALUE: 28
PIF_VALUE: 31
PIF_VALUE: 19
PIF_VALUE: 28
PIF_VALUE: 28
PIF_VALUE: 29
PIF_VALUE: 32
PIF_VALUE: 28
PIF_VALUE: 28
PIF_VALUE: 29
PIF_VALUE: 31
PIF_VALUE: 35
PIF_VALUE: 30
PIF_VALUE: 28

## 2021-08-18 NOTE — PROGRESS NOTES
08/18/21 0820   Vent Information   Vent Type 980   Vent Mode AC/VC   Vt Ordered 400 mL   Rate Set 20 bmp   Peak Flow 40 L/min   Pressure Support 0 cmH20   FiO2  50 %   SpO2 97 %   SpO2/FiO2 ratio 194   Sensitivity 3   PEEP/CPAP 12   Vent Patient Data   Peak Inspiratory Pressure 28 cmH2O   Mean Airway Pressure 18 cmH20   Rate Measured 20 br/min   Vt Exhaled 405 mL   Minute Volume 8.13 Liters   I:E Ratio 1:1.70   Spontaneous Breathing Trial (SBT) RT Doc   Pulse 66   Additional Respiratory  Assessments   Resp 20   Alarm Settings   High Pressure Alarm 45 cmH2O   Low Minute Volume Alarm 2 L/min   High Respiratory Rate 40 br/min   Low Exhaled Vt  200 mL   Patient Observation   Observations Ambu @ bedside, Pt prone   ETT (adult)   Placement Date/Time: 08/17/21 1216   Tube Size: 7.5 mm  Laryngoscope: GlideScope  Blade Size: 4  Location: Oral  Placement Verified By[de-identified] Colorimetric EtCO2 device  Secured at: 23 cm  Placed By: Licensed provider   Secured at 23 cm   Measured From Lips   ET Placement Left   Secured By Commercial tube stuart   Site Condition Dry   Cuff Pressure 30 cm H2O

## 2021-08-18 NOTE — PROGRESS NOTES
Pt prepared to be proned by applying the protective mepilex dressings and having the protective pads to place once pt is proned. Pt proned per protocol with multiple RNS Michelle ZHU and RT at bedside.

## 2021-08-18 NOTE — PROGRESS NOTES
08/17/21 2017   Vent Information   Skin Assessment Clean, dry, & intact   Vent Type 980   Vent Mode AC/VC   Vt Ordered 400 mL   Rate Set 20 bmp   Peak Flow 40 L/min   Pressure Support 0 cmH20   FiO2  60 %   SpO2 98 %   SpO2/FiO2 ratio 163.33   Sensitivity 3   PEEP/CPAP 14   Vent Patient Data   Peak Inspiratory Pressure 35 cmH2O   Mean Airway Pressure 20 cmH20   Rate Measured 20 br/min   Vt Exhaled 406 mL   Minute Volume 8.11 Liters   I:E Ratio 1:1.70   Cough/Sputum   Sputum How Obtained Oral;Endotracheal   Cough None   Sputum Amount Small   Sputum Color Clear   Tenacity Thin   Spontaneous Breathing Trial (SBT) RT Doc   Pulse 76   Breath Sounds   Right Upper Lobe Clear;Diminished   Right Middle Lobe Diminished   Right Lower Lobe Diminished   Left Upper Lobe Clear;Diminished   Left Lower Lobe Diminished   Additional Respiratory  Assessments   Resp 20   Alarm Settings   High Pressure Alarm 45 cmH2O   Low Minute Volume Alarm 2 L/min   Apnea (secs) 20 secs   High Respiratory Rate 40 br/min   Patient Observation   Observations ambu at bedside   ETT (adult)   Placement Date/Time: 08/17/21 1216   Tube Size: 7.5 mm  Laryngoscope: GlideScope  Blade Size: 4  Location: Oral  Placement Verified By[de-identified] Colorimetric EtCO2 device  Secured at: 23 cm  Placed By: Licensed provider   Secured at 23 cm   Measured From Lips   ET Placement Left   Secured By Commercial tube stuart   Site Condition Dry   Cuff Pressure 30 cm H2O

## 2021-08-18 NOTE — PROGRESS NOTES
08/18/21 1142   Vent Information   Vent Type 980   Vent Mode AC/VC   Vt Ordered 400 mL   Rate Set 20 bmp   Peak Flow 40 L/min   Pressure Support 0 cmH20   FiO2  70 %   SpO2 98 %   SpO2/FiO2 ratio 140   Sensitivity 3   PEEP/CPAP 12   Vent Patient Data   Peak Inspiratory Pressure 30 cmH2O   Mean Airway Pressure 18 cmH20   Rate Measured 20 br/min   Vt Exhaled 407 mL   Minute Volume 8.11 Liters   I:E Ratio 1:1.70   Cough/Sputum   Sputum How Obtained Oral;Endotracheal;Suctioned   Sputum Amount Small   Sputum Color Clear   Tenacity Thin   Spontaneous Breathing Trial (SBT) RT Doc   Pulse 59   Breath Sounds   Right Upper Lobe Diminished   Right Middle Lobe Diminished   Right Lower Lobe Diminished   Left Upper Lobe Diminished   Left Lower Lobe Diminished   Additional Respiratory  Assessments   Resp 20   Alarm Settings   High Pressure Alarm 45 cmH2O   Low Minute Volume Alarm 2 L/min   High Respiratory Rate 40 br/min   Low Exhaled Vt  200 mL   Patient Observation   Observations Ambu @ bedside.  ETT moved to center   ETT (adult)   Placement Date/Time: 08/17/21 1216   Tube Size: 7.5 mm  Laryngoscope: GlideScope  Blade Size: 4  Location: Oral  Placement Verified By[de-identified] Colorimetric EtCO2 device  Secured at: 23 cm  Placed By: Licensed provider   Secured at 23 cm   Measured From [address] [place] [address],[address] By Commercial tube stuart   Site Condition Dry   Cuff Pressure 30 cm H2O

## 2021-08-18 NOTE — PLAN OF CARE
Problem: Nutrition  Goal: Optimal nutrition therapy  Outcome: Ongoing  Note: Nutrition Problem #1: Inadequate oral intake  Intervention: Food and/or Nutrient Delivery: Start Tube Feeding  Nutritional Goals: Tolerate nutrition support at goal this admission without BG, electrolyte or GI disturbances.

## 2021-08-18 NOTE — PROGRESS NOTES
08/18/21 0354   Vent Information   Vent Type 980   Vent Mode AC/VC   Vt Ordered 400 mL   Rate Set 20 bmp   Peak Flow 40 L/min   Pressure Support 0 cmH20   FiO2  50 %   SpO2 97 %   SpO2/FiO2 ratio 194   Sensitivity 3   PEEP/CPAP 12   Vent Patient Data   Peak Inspiratory Pressure 27 cmH2O   Mean Airway Pressure 17 cmH20   Rate Measured 20 br/min   Vt Exhaled 407 mL   Minute Volume 8.15 Liters   I:E Ratio 1:1.70   Spontaneous Breathing Trial (SBT) RT Doc   Pulse 82   Breath Sounds   Right Upper Lobe Diminished   Right Middle Lobe Diminished   Right Lower Lobe Diminished   Left Upper Lobe Diminished   Left Lower Lobe Diminished   Additional Respiratory  Assessments   Resp 20   Alarm Settings   High Pressure Alarm 45 cmH2O   Low Minute Volume Alarm 2 L/min   High Respiratory Rate 40 br/min   ETT (adult)   Placement Date/Time: 08/17/21 1216   Tube Size: 7.5 mm  Laryngoscope: GlideScope  Blade Size: 4  Location: Oral  Placement Verified By[de-identified] Colorimetric EtCO2 device  Secured at: 23 cm  Placed By: Licensed provider   Secured at 23 cm   Measured From Lips   ET Placement Left   Secured By Commercial tube stuart

## 2021-08-18 NOTE — PROGRESS NOTES
Pulmonary & Critical Care Medicine ICU Progress Note      Reason for visit: Acute respiratory failure, severe COVID-19 pneumonia. Past history of asthma, anxiety, iron deficiency anemia. Events of Last 24 hours: The patient had progressively worsened, has remained afebrile and hemodynamically stable. SaO2 dropping into the 80s in spite of high flow nasal cannula, Vapotherm and BiPAP trial with FiO2 100%. Respiratory rate in the 40s, on BiPAP 12/6, did not drop below the high 30s. She was intubated and placed in prone position, central line was placed. She is now supine with FiO2 50%, SaO2 98%. She did have low blood pressure, was on low-dose norepinephrine at 1 mcg/min. Vent settings are rate of 20,  mL, rate of 12, FiO2 was dropped from 70% to 50%. She is on propofol 25, fentanyl 100 and was on Nimbex. Invasive Lines:   CVC RIJ 8/17  Art Line 8/17        MV: 8/17/2021    Recent Labs     08/18/21  0133 08/18/21  0651   PHART 7.302* 7.343*   [id number] 45.2* 44.2   PO2ART 92.8 94.1       MV Settings:  Vent Mode: AC/VC Rate Set: 20 bmp/Vt Ordered: 400 mL/ [email or web address])    IV:   dextrose      propofol 25 mcg/kg/min (08/18/21 0933)    fentaNYL (SUBLIMAZE) infusion 100 mcg/hr (08/18/21 1018)    cisatracurium (NIMBEX) infusion 1 mcg/kg/min (08/18/21 0530)    norepinephrine Stopped (08/17/21 2051)    sodium chloride         Vitals:  BP (!) 108/50   Pulse 66   Temp 96.6 °F (35.9 °C) (Bladder)   Resp 20   Ht 5' (1.524 m)   Wt 185 lb (83.9 kg)   LMP 04/15/2012   SpO2 97%   BMI 36.13 kg/m²          Intake/Output Summary (Last 24 hours) at 8/18/2021 1026  Last data filed at 8/18/2021 0530  Gross per 24 hour   Intake 620.13 ml   Output 1130 ml   Net -509.87 ml       EXAM:  Due to the current efforts to prevent transmission of COVID-19 and also the need to preserve PPE for other caregivers, a face-to-face encounter with the patient was not performed.  That being said, all relevant records and diagnostic tests were reviewed, including laboratory results and imaging. Please reference any relevant documentation elsewhere. Care will be coordinated with the primary service. Medications:  Scheduled Meds:   sennosides-docusate sodium  2 tablet Oral Daily    insulin lispro  0-6 Units Subcutaneous Q4H    mupirocin   Nasal BID    pantoprazole  40 mg Intravenous Daily    carboxymethylcellulose PF  1 drop Both Eyes Q4H    chlorhexidine  15 mL Mouth/Throat BID    montelukast  10 mg Oral Nightly    PARoxetine  40 mg Oral Daily    sodium chloride flush  5-40 mL Intravenous 2 times per day    methylPREDNISolone  40 mg Intravenous Q12H    Vitamin D  2,000 Units Oral Daily    enoxaparin  30 mg Subcutaneous BID       PRN Meds:  glucose, dextrose, glucagon (rDNA), dextrose, potassium chloride, guaiFENesin-dextromethorphan, benzocaine-menthol, zolpidem, sodium chloride flush, sodium chloride, ondansetron **OR** ondansetron, polyethylene glycol, acetaminophen **OR** acetaminophen    Results:  CBC:   Recent Labs     08/16/21  1309 08/17/21  1519 08/18/21  0538   WBC 3.2* 3.5* 5.0   HGB 12.6 12.2 13.2   HCT 36.2 34.8* 37.2   MCV 90.2 90.7 91.1    211 278     BMP:   Recent Labs     08/16/21  1309 08/17/21  1519 08/18/21  0538    140 140   K 3.4* 3.4* 4.4    104 106   CO2 23 24 22   BUN 17 30* 31*   CREATININE 0.9 1.0 0.8     LIVER PROFILE:   Recent Labs     08/16/21  1309   AST 50*   ALT 43*   BILITOT 0.4   ALKPHOS 58     Cultures:      Films:  CXR reviewed by me       Assessment and plan:  Acute respiratory failure, hypoxemic. Failed high flow oxygen as well as BiPAP, 80. Placed on a mechanical ventilation 8/17. She was placed in the prone position, moved back to supine position today with adequate oxygenation. Acute COVID-19 pneumonia. Presented with GI symptoms and confusion, those had resolved but her respiratory status has worsened. ID following, received Actemra.   On appropriate

## 2021-08-18 NOTE — PROGRESS NOTES
08/18/21 1010   Vent Information   Vent Type 980   Vent Mode AC/VC   Vt Ordered 400 mL   Rate Set 20 bmp   Peak Flow 40 L/min   Pressure Support 0 cmH20   FiO2  50 %   SpO2 100 %   SpO2/FiO2 ratio 200   Sensitivity 3   PEEP/CPAP 12   Vent Patient Data   Peak Inspiratory Pressure 28 cmH2O   Mean Airway Pressure 18 cmH20   Rate Measured 21 br/min   Vt Exhaled 386 mL   Minute Volume 8.42 Liters   I:E Ratio 1:1.70   Spontaneous Breathing Trial (SBT) RT Doc   Pulse 97   Alarm Settings   High Pressure Alarm 45 cmH2O   Low Minute Volume Alarm 2 L/min   High Respiratory Rate 40 br/min   Low Exhaled Vt  200 mL   Patient Observation   Observations Pt supined on 100% fio2.

## 2021-08-18 NOTE — PROGRESS NOTES
Rebecca Em admitted 2021  7:13 PM FOR nausea/vomiting and recent dx  +COVID. Increased oxygen needs overnight, RR called for respiratory status, placed on AVAPS and transferred to PCU. Ultimately transferred to ICU requiring intubation  with declining respiratory status. Patient seen and examined today during multidisciplinary ICU rounds  Intubated, sedated/paralyzed and in prone position. Past Medical History:   Diagnosis Date    Anxiety     Asthma     COVID-19 2021    Haemorrhoids in the puerperium, delivered, with mention of postpartum complication     Iron deficiency     Menorrhagia       Past Surgical History:   Procedure Laterality Date    BREAST REDUCTION SURGERY      LAPAROSCOPY      TONSILLECTOMY      ULNA OSTEOTOMY          BP (!) 108/50   Pulse 59   Temp 97.6 °F (36.4 °C) (Bladder)   Resp 20   Ht 5' (1.524 m)   Wt 185 lb (83.9 kg)   LMP 04/15/2012   SpO2 98%   BMI 36.13 kg/m²     CVC Triple Lumen 21 Right Internal jugular (Active)   Placement Date/Time: 21 1230   Pre-existing: No  Timeout: Patient;Procedure;Site/Side;Appropriate Equipment; Consent Confirmed;Sterile Technique using full body drape;Site prepped with chlorhexidine;Sterile drsg with biopatch; Availability of Impl. .. ETT (adult) (Active)   Placement Date/Time: 21 1216   Tube Size: 7.5 mm  Laryngoscope: GlideScope  Blade Size: 4  Location: Oral  Placement Verified By[de-identified] Colorimetric EtCO2 device  Secured at: 23 cm  Placed By: Licensed provider       Arterial Line 21 Left Radial (Active)   Placement Date/Time: 21 1230   Allens Test: Yes  Timeout: Patient;Procedure;Site/Side;Appropriate Equipment; Consent Confirmed;Sterile Technique using full body drape;Site prepped with chlorhexidine;Sterile drsg with biopatch; Availability of Impl. .. Plan:  MV D2 FiO2 50/12 while proned. AB.34/44/94/23  Sedated with propofol/fentanyl with paralytic.  Supine trial today and d/c paralytic if able to maintain sats  Transient hypotension yesterday but not currently requiring pressors  Continue IV steroids for severe COVID, received toci 8/16. Start enteral TF today once supine, LSS insulin ordered  Droplet isolation as ordered. Discussed with Dr. Manjula Garza today    DVT ppx: lovenox  GI ppx: protonix    Michelle Costa, APRN-CNP

## 2021-08-18 NOTE — CARE COORDINATION
Chart reviewed, LOS 2days as Inpatient. Pt intubated on vent, has been prone, on nimbex. CM will continue to follow pt's progress, and coordinate discharge arrangements as appropriate.   CARMEN Perez-AMY

## 2021-08-18 NOTE — PROGRESS NOTES
08/18/21 1629   Vent Information   Vent Type 980   Vent Mode AC/VC   Vt Ordered 400 mL   Rate Set 20 bmp   Peak Flow 40 L/min   Pressure Support 0 cmH20   FiO2  80 %   SpO2 97 %   SpO2/FiO2 ratio 121.25   Sensitivity 3   PEEP/CPAP 12   Vent Patient Data   Peak Inspiratory Pressure 35 cmH2O   Mean Airway Pressure 17 cmH20   Rate Measured 22 br/min   Vt Exhaled 497 mL   Minute Volume 8.53 Liters   I:E Ratio 1:1.70   Cough/Sputum   Sputum How Obtained Endotracheal;Suctioned   Sputum Amount Small   Sputum Color Cloudy; Tan   Tenacity Thin   Spontaneous Breathing Trial (SBT) RT Doc   Pulse 66   Breath Sounds   Right Upper Lobe Diminished   Right Middle Lobe Diminished   Right Lower Lobe Diminished   Left Upper Lobe Diminished   Left Lower Lobe Diminished   Additional Respiratory  Assessments   Resp 16   Alarm Settings   High Pressure Alarm 45 cmH2O   Low Minute Volume Alarm 2 L/min   High Respiratory Rate 40 br/min   Low Exhaled Vt  200 mL   Patient Observation   Observations Ambu @ bedside.     ETT (adult)   Placement Date/Time: 08/17/21 1216   Tube Size: 7.5 mm  Laryngoscope: GlideScope  Blade Size: 4  Location: Oral  Placement Verified By[de-identified] Colorimetric EtCO2 device  Secured at: 23 cm  Placed By: Licensed provider   Secured at 23 cm   Measured From Lips   Secured By Commercial tube stuart   Site Condition Dry   Cuff Pressure 30 cm H2O

## 2021-08-18 NOTE — CONSULTS
Comprehensive Nutrition Assessment    Type and Reason for Visit:  Initial, Consult    Nutrition Recommendations/Plan:   1. Recommend TF initiation. Order: \"Diet: Tube feed continuous/ NPO\". Initiate Vital (High protein formula) at 10 mL/hr and as tolerated, increase by 10 mL/hr q 6 hours until goal of 55 mL/hr is met via N/G  2. Recommend 60 mL H20 flush q 4 hours. Monitor IVF infusion, Na labs and need for adjustments in water flush  3. Recommend 1 Bottle Proteinex P2Go daily via syringe. Flush with 30 mL H20 before and after  4. Consider daily micronutrient supplementation: 2000 IU Vitamin D3, MVM, + Probiotic   5. Monitor TF tolerance (abd distention, bowel habits, N/V, cramping)  6. Check TG while on diprivan infusion  7. Monitor nutrition adequacy, pertinent labs, bowel habits, wt changes, and clinical progress      Nutrition Assessment:  Consult for TF order and mgmt. Pt is a 62 y/o female admitted with COVID-19 infection. Currently intubated, sedated on fentanyl, propofol at 12.6 mL/hr (333 kcals). Paralyzed on nimbex, fentanyl at 100 mcg. + NG in place. +BM 8/16. MDR completed. OK to intiate trophic feeds today. Malnutrition Assessment:  Malnutrition Status: At risk for malnutrition (Comment)    Context:  Acute Illness       Estimated Daily Nutrient Needs:  Energy (kcal):  3784-3035; Weight Used for Energy Requirements:  Ideal (45 kg)     Protein (g):   g; Weight Used for Protein Requirements:  Ideal (2-2.5)        Fluid (ml/day):  1 mL/kcal; Method Used for Fluid Requirements:  1 ml/kcal      Nutrition Related Findings:  Hypoactive bowel sounds. BM regimen added. Proned/paralyzed.       Wounds:  None       Current Nutrition Therapies:    Diet NPO  ADULT TUBE FEEDING; Nasogastric; Peptide Based High Protein; Continuous; 10; Yes; 10; Q 6 hours; 55; 60; Q 4 hours   · Current Tube Feeding (TF) Orders:   · Feeding Route: Nasogastric  · Formula: Peptide Based High Protein  · Goal TF & Flush Orders Provides: Vital high protein at 55 mL/hr x 20 hrs to provide 1100mL TV, 1100 kcals, 96 g protein and 920 mL free water. + 1 proteinex modular for added 104 kcals and 26 g protein. Anthropometric Measures:  · Height: 5' (152.4 cm)  · Current Body Weight: 185 lb (83.9 kg)   · Ideal Body Weight: 100 lbs  · BMI: 36.1  · BMI Categories: Obese Class 2 (BMI 35.0 -39.9)       Nutrition Diagnosis:   · Inadequate oral intake related to acute injury/trauma as evidenced by NPO or clear liquid status due to medical condition, intubation      Nutrition Interventions:   Food and/or Nutrient Delivery:  Start Tube Feeding  Nutrition Education/Counseling:  No recommendation at this time   Coordination of Nutrition Care:  Continue to monitor while inpatient    Goals: Tolerate nutrition support at goal this admission without BG, electrolyte or GI disturbances. Nutrition Monitoring and Evaluation:   Behavioral-Environmental Outcomes:  None Identified   Food/Nutrient Intake Outcomes:  Enteral Nutrition Intake/Tolerance  Physical Signs/Symptoms Outcomes:  GI Status     Discharge Planning:    Enteral Nutrition     Electronically signed by Halina Schuster RD, LD on 8/18/21 at 11:05 AM EDT    Contact: [phone]

## 2021-08-18 NOTE — PROGRESS NOTES
08/17/21 2357   Vent Information   Vent Type 980   Vent Mode AC/VC   Vt Ordered 400 mL   Rate Set 20 bmp   Peak Flow 40 L/min   Pressure Support 0 cmH20   FiO2  50 %   SpO2 97 %   SpO2/FiO2 ratio 194   Sensitivity 3   PEEP/CPAP 14   Humidification Source HME   Vent Patient Data   Peak Inspiratory Pressure 31 cmH2O   Mean Airway Pressure 20 cmH20   Rate Measured 20 br/min   Vt Exhaled 404 mL   Minute Volume 8.08 Liters   I:E Ratio 1:1.70   Spontaneous Breathing Trial (SBT) RT Doc   Pulse 63   Breath Sounds   Right Upper Lobe Diminished   Right Middle Lobe Diminished   Right Lower Lobe Diminished   Left Upper Lobe Diminished   Left Lower Lobe Diminished   Additional Respiratory  Assessments   Resp 20   Alarm Settings   High Pressure Alarm 45 cmH2O   Low Minute Volume Alarm 2 L/min   High Respiratory Rate 40 br/min   ETT (adult)   Placement Date/Time: 08/17/21 1216   Tube Size: 7.5 mm  Laryngoscope: GlideScope  Blade Size: 4  Location: Oral  Placement Verified By[de-identified] Colorimetric EtCO2 device  Secured at: 23 cm  Placed By: Licensed provider   Secured at 23 cm   Measured From Lips   ET Placement Right   Secured By Commercial tube stuart   Site Condition Dry

## 2021-08-18 NOTE — PROGRESS NOTES
Hospitalist Progress Note      PCP: No primary care provider on file. Date of Admission: 8/12/2021         Chief Complaint:   covid/dehydration     Hospital Course: reviewed      Subjective: remains intubated , currently being proned       Medications:  Reviewed    Infusion Medications    dextrose      propofol 25 mcg/kg/min (08/18/21 0530)    fentaNYL (SUBLIMAZE) infusion 100 mcg/hr (08/18/21 0530)    cisatracurium (NIMBEX) infusion 1 mcg/kg/min (08/18/21 0530)    norepinephrine Stopped (08/17/21 2051)    sodium chloride       Scheduled Medications    sennosides-docusate sodium  2 tablet Oral Daily    insulin lispro  0-6 Units Subcutaneous Q4H    pantoprazole  40 mg Intravenous Daily    carboxymethylcellulose PF  1 drop Both Eyes Q4H    chlorhexidine  15 mL Mouth/Throat BID    montelukast  10 mg Oral Nightly    PARoxetine  40 mg Oral Daily    sodium chloride flush  5-40 mL Intravenous 2 times per day    methylPREDNISolone  40 mg Intravenous Q12H    Vitamin D  2,000 Units Oral Daily    enoxaparin  30 mg Subcutaneous BID     PRN Meds: glucose, dextrose, glucagon (rDNA), dextrose, potassium chloride, guaiFENesin-dextromethorphan, benzocaine-menthol, zolpidem, sodium chloride flush, sodium chloride, ondansetron **OR** ondansetron, polyethylene glycol, acetaminophen **OR** acetaminophen      Intake/Output Summary (Last 24 hours) at 8/18/2021 0905  Last data filed at 8/18/2021 0530  Gross per 24 hour   Intake 620.13 ml   Output 1130 ml   Net -509.87 ml       Physical Exam Performed:    BP (!) 108/50   Pulse 66   Temp 96.6 °F (35.9 °C) (Bladder)   Resp 20   Ht 5' (1.524 m)   Wt 185 lb (83.9 kg)   LMP 04/15/2012   SpO2 97%   BMI 36.13 kg/m²     General appearance:  no distress today, appears stated age and cooperative. intubated, proned  HEENT:  Normal cephalic, atraumatic without obvious deformity. Pupils equal, round, and reactive to light.  Extra ocular muscles intact. Conjunctivae/corneas clear. Neck: Supple, with full range of motion. No jugular venous distention. Trachea midline. Respiratory:  inc'd respiratory effort. Clear to auscultation, bilaterally without Wheezes/Rhonchi. Mild bibas rales  Cardiovascular:  Regular rate and rhythm with normal S1/S2 without murmurs, rubs or gallops. Abdomen: Soft, non-tender, non-distended with normal bowel sounds. Musculoskeletal:  No clubbing, cyanosis or edema bilaterally.  Full range of motion without deformity. Skin: Skin color, texture, turgor normal.  No rashes or lesions. Neurologic:  Neurovascularly intact without any focal sensory/motor deficits. Cranial nerves: II-XII intact, grossly non-focal.  Psychiatric:  Alert and oriented x0, sedated  Capillary Refill: Brisk,3 seconds, normal  Peripheral Pulses: +2 palpable, equal bilaterally     Labs:   Recent Labs     08/16/21  1309 08/17/21  1519 08/18/21  0538   WBC 3.2* 3.5* 5.0   HGB 12.6 12.2 13.2   HCT 36.2 34.8* 37.2    211 278     Recent Labs     08/16/21  1309 08/17/21  1519 08/18/21  0538    140 140   K 3.4* 3.4* 4.4    104 106   CO2 23 24 22   BUN 17 30* 31*   CREATININE 0.9 1.0 0.8   CALCIUM 8.3 8.0* 8.2*     Recent Labs     08/16/21  1309   AST 50*   ALT 43*   BILITOT 0.4   ALKPHOS 58     No results for input(s): INR in the last 72 hours. No results for input(s): Connee Matar in the last 72 hours. Urinalysis:      Lab Results   Component Value Date    NITRU Negative 08/12/2021    WBCUA 10-20 08/12/2021    BACTERIA 1+ 08/12/2021    RBCUA 3-4 08/12/2021    BLOODU TRACE-INTACT 08/12/2021    SPECGRAV >=1.030 08/12/2021    GLUCOSEU Negative 08/12/2021       Radiology:  XR ABDOMEN FOR NG/OG/NE TUBE PLACEMENT   Preliminary Result   Status post placement of nasogastric tube with distal tip located in the   region the stomach as described above. XR CHEST PORTABLE   Preliminary Result   1.  Status post placement of endotracheal tube with distal tip located   approximately 3.5 cm above the elsi. 2. Status post placement of right internal jugular central line with distal   tip located near junction of superior vena cava and right atrium. No   evidence of pneumothorax. XR CHEST PORTABLE   Final Result   Diffuse bilateral airspace disease. Aeration is improved compared to prior   study. XR CHEST PORTABLE   Final Result   Significant worsening in appearance of bilateral pneumonia         CT Head WO Contrast   Final Result   No acute intracranial abnormality. CT ABDOMEN PELVIS W IV CONTRAST Additional Contrast? None   Final Result   1. Mucosal thickening and enhancement of the terminal ileum, proximal colon   and sigmoid colon/rectum. Pattern is nonspecific and suggests underlying   enterocolitis. Infectious or inflammatory etiologies may be considered. 2. No pattern of bowel obstruction. 3. Hepatic steatosis. 4. Airspace changes in the lower chest likely represent underlying viral   pneumonia. XR CHEST PORTABLE   Final Result   Perihilar opacities could represent COVID pneumonia given indication. Correlate with presentation to exclude superimposed congestive heart failure.                  Assessment/Plan:    Active Hospital Problems    Diagnosis     Acute respiratory failure with hypoxia (HCC) [J96.01]     Altered mental status [R41.82]     Mild intermittent asthma without complication [S93.70]     CINDY on CPAP [G47.33, Z99.89]     Elevated transaminase level [R74.01]     Hepatic steatosis [K76.0]     Obesity (BMI 35.0-39.9 without comorbidity) [E66.9]     COVID-19 [U07.1]           Sepsis - due to covid, with tachypnea/tachycardia and +rapid test, pt was recently diagnosed on 8/11  -supportive care  -ivfs given  -lactate was wnl  -no abx given viral etiology     Acute encephalopathy- seemed to have improved with ivfs, due to ?covid, CThead no acute abn  -tele  -neurochecks     Acute resp failure- with sats 88% ra in ER per staff, likely from covid, pt is unvaccinated  -iv solumedrol q12h ordered  -supportive care  -tele  -ID consulted, apprec recs, Per ID note( Date of symptom onset 8/7/21, Date of diagnosis and admission 8/13/21 (date of outpt +test unclear))  - started on Actemra on 8/16   -pulm consulted given worsening   -intubated 8/17, proned as well     N/v/abd pain/diarrhea- likely from covid, CTa/p done(suggestive of enterocolitis, no obstruction, noted hepatiac steatosis)  -continue supportive care  - cdiff negative  -ua was abn and ucx > 50k mixed mike, no UTI at this time     Asthma- on home singulair     Anxiety-continued paxil        DVT Prophylaxis: lovenox bid  Diet: Diet NPO  ADULT TUBE FEEDING; Nasogastric; Peptide Based High Protein; Continuous; 10; Yes; 10; Q 6 hours; 55; 60; Q 4 hours  Code Status: Full Code    PT/OT Eval Status: not possible    Dispo -unclear, icu care    Carmen Gonzalez MD

## 2021-08-18 NOTE — PROCEDURES
Arterial Line Placement Procedure Note                     Indication: Need for serial blood work, arterial blood gases and mechanical ventilation    Consent: Unable to be obtained due to the emergent nature of this procedure. Bull's Test: Was not performed    Procedure: The skin over the right brachial artery was prepped with Chloraprep and draped in a sterile fashion. Local anesthesia was not performed due to the emergent nature of this procedure. A 20 gauge angiocath was then inserted, using a modified Seldinger technique, into the vessel. The transducer set was then attached and securely fastened to the skin with sutures and with an adhesive dressing. Waveforms on the monitor were observed and found to be adequate. The patient had good distal perfusion after the procedure. The site was then dressed in a sterile fashion. The patient tolerated the procedure well. Complications: None. EBL less than 2 cc    Michelle Barnes, APRN-CNP

## 2021-08-19 LAB
ALBUMIN SERPL-MCNC: 3.1 G/DL (ref 3.4–5)
ANION GAP SERPL CALCULATED.3IONS-SCNC: 11 MMOL/L (ref 3–16)
BASE EXCESS ARTERIAL: -1 (ref -3–3)
BASOPHILS ABSOLUTE: 0 K/UL (ref 0–0.2)
BASOPHILS RELATIVE PERCENT: 0.3 %
BUN BLDV-MCNC: 38 MG/DL (ref 7–20)
CALCIUM SERPL-MCNC: 8.2 MG/DL (ref 8.3–10.6)
CHLORIDE BLD-SCNC: 103 MMOL/L (ref 99–110)
CO2: 21 MMOL/L (ref 21–32)
CREAT SERPL-MCNC: 0.9 MG/DL (ref 0.6–1.2)
EOSINOPHILS ABSOLUTE: 0 K/UL (ref 0–0.6)
EOSINOPHILS RELATIVE PERCENT: 0 %
GFR AFRICAN AMERICAN: >60
GFR NON-AFRICAN AMERICAN: >60
GLUCOSE BLD-MCNC: 145 MG/DL (ref 70–99)
GLUCOSE BLD-MCNC: 154 MG/DL (ref 70–99)
GLUCOSE BLD-MCNC: 159 MG/DL (ref 70–99)
GLUCOSE BLD-MCNC: 178 MG/DL (ref 70–99)
GLUCOSE BLD-MCNC: 205 MG/DL (ref 70–99)
GLUCOSE BLD-MCNC: 211 MG/DL (ref 70–99)
GLUCOSE BLD-MCNC: 211 MG/DL (ref 70–99)
HCO3 ARTERIAL: 25.4 MMOL/L (ref 21–29)
HCT VFR BLD CALC: 35.7 % (ref 36–48)
HEMOGLOBIN: 13 G/DL (ref 12–16)
LYMPHOCYTES ABSOLUTE: 0.3 K/UL (ref 1–5.1)
LYMPHOCYTES RELATIVE PERCENT: 2.7 %
MCH RBC QN AUTO: 33.4 PG (ref 26–34)
MCHC RBC AUTO-ENTMCNC: 36.5 G/DL (ref 31–36)
MCV RBC AUTO: 91.6 FL (ref 80–100)
MONOCYTES ABSOLUTE: 0.5 K/UL (ref 0–1.3)
MONOCYTES RELATIVE PERCENT: 4.1 %
NEUTROPHILS ABSOLUTE: 10.5 K/UL (ref 1.7–7.7)
NEUTROPHILS RELATIVE PERCENT: 92.9 %
O2 SAT, ARTERIAL: 95 % (ref 93–100)
PCO2 ARTERIAL: 50.4 MM HG (ref 35–45)
PDW BLD-RTO: 13.4 % (ref 12.4–15.4)
PERFORMED ON: ABNORMAL
PH ARTERIAL: 7.31 (ref 7.35–7.45)
PHOSPHORUS: 3.9 MG/DL (ref 2.5–4.9)
PLATELET # BLD: 396 K/UL (ref 135–450)
PMV BLD AUTO: 8.2 FL (ref 5–10.5)
PO2 ARTERIAL: 82.6 MM HG (ref 75–108)
POC SAMPLE TYPE: ABNORMAL
POTASSIUM SERPL-SCNC: 4.1 MMOL/L (ref 3.5–5.1)
RBC # BLD: 3.9 M/UL (ref 4–5.2)
SODIUM BLD-SCNC: 135 MMOL/L (ref 136–145)
TCO2 ARTERIAL: 27 MMOL/L
WBC # BLD: 11.3 K/UL (ref 4–11)

## 2021-08-19 PROCEDURE — 2700000000 HC OXYGEN THERAPY PER DAY

## 2021-08-19 PROCEDURE — 99291 CRITICAL CARE FIRST HOUR: CPT | Performed by: INTERNAL MEDICINE

## 2021-08-19 PROCEDURE — 6360000002 HC RX W HCPCS: Performed by: NURSE PRACTITIONER

## 2021-08-19 PROCEDURE — 94003 VENT MGMT INPAT SUBQ DAY: CPT

## 2021-08-19 PROCEDURE — 94761 N-INVAS EAR/PLS OXIMETRY MLT: CPT

## 2021-08-19 PROCEDURE — 2580000003 HC RX 258: Performed by: NURSE PRACTITIONER

## 2021-08-19 PROCEDURE — 2580000003 HC RX 258: Performed by: INTERNAL MEDICINE

## 2021-08-19 PROCEDURE — 6360000002 HC RX W HCPCS: Performed by: INTERNAL MEDICINE

## 2021-08-19 PROCEDURE — 84478 ASSAY OF TRIGLYCERIDES: CPT

## 2021-08-19 PROCEDURE — APPNB45 APP NON BILLABLE 31-45 MINUTES: Performed by: NURSE PRACTITIONER

## 2021-08-19 PROCEDURE — 6370000000 HC RX 637 (ALT 250 FOR IP): Performed by: NURSE PRACTITIONER

## 2021-08-19 PROCEDURE — 82947 ASSAY GLUCOSE BLOOD QUANT: CPT

## 2021-08-19 PROCEDURE — 6370000000 HC RX 637 (ALT 250 FOR IP): Performed by: INTERNAL MEDICINE

## 2021-08-19 PROCEDURE — C9113 INJ PANTOPRAZOLE SODIUM, VIA: HCPCS | Performed by: NURSE PRACTITIONER

## 2021-08-19 PROCEDURE — 82803 BLOOD GASES ANY COMBINATION: CPT

## 2021-08-19 PROCEDURE — 80069 RENAL FUNCTION PANEL: CPT

## 2021-08-19 PROCEDURE — 2000000000 HC ICU R&B

## 2021-08-19 PROCEDURE — 85025 COMPLETE CBC W/AUTO DIFF WBC: CPT

## 2021-08-19 RX ORDER — MIDAZOLAM HYDROCHLORIDE 1 MG/ML
2 INJECTION INTRAMUSCULAR; INTRAVENOUS EVERY 30 MIN PRN
Status: DISCONTINUED | OUTPATIENT
Start: 2021-08-19 | End: 2021-08-25

## 2021-08-19 RX ADMIN — PROPOFOL 50 MCG/KG/MIN: 10 INJECTION, EMULSION INTRAVENOUS at 13:53

## 2021-08-19 RX ADMIN — INSULIN LISPRO 1 UNITS: 100 INJECTION, SOLUTION INTRAVENOUS; SUBCUTANEOUS at 00:40

## 2021-08-19 RX ADMIN — ACETAMINOPHEN 650 MG: 325 TABLET ORAL at 21:46

## 2021-08-19 RX ADMIN — Medication 15 ML: at 08:55

## 2021-08-19 RX ADMIN — FENTANYL CITRATE 200 MCG/HR: 0.05 INJECTION, SOLUTION INTRAMUSCULAR; INTRAVENOUS at 21:20

## 2021-08-19 RX ADMIN — CARBOXYMETHYLCELLULOSE SODIUM 1 DROP: 10 GEL OPHTHALMIC at 08:52

## 2021-08-19 RX ADMIN — PROPOFOL 50 MCG/KG/MIN: 10 INJECTION, EMULSION INTRAVENOUS at 18:03

## 2021-08-19 RX ADMIN — Medication 2000 UNITS: at 08:53

## 2021-08-19 RX ADMIN — MONTELUKAST SODIUM 10 MG: 10 TABLET ORAL at 21:30

## 2021-08-19 RX ADMIN — MIDAZOLAM 2 MG/HR: 5 INJECTION INTRAMUSCULAR; INTRAVENOUS at 00:38

## 2021-08-19 RX ADMIN — INSULIN LISPRO 1 UNITS: 100 INJECTION, SOLUTION INTRAVENOUS; SUBCUTANEOUS at 12:00

## 2021-08-19 RX ADMIN — ENOXAPARIN SODIUM 30 MG: 30 INJECTION SUBCUTANEOUS at 21:31

## 2021-08-19 RX ADMIN — PANTOPRAZOLE SODIUM 40 MG: 40 INJECTION, POWDER, FOR SOLUTION INTRAVENOUS at 08:52

## 2021-08-19 RX ADMIN — PROPOFOL 50 MCG/KG/MIN: 10 INJECTION, EMULSION INTRAVENOUS at 00:15

## 2021-08-19 RX ADMIN — MIDAZOLAM 10 MG/HR: 5 INJECTION INTRAMUSCULAR; INTRAVENOUS at 11:05

## 2021-08-19 RX ADMIN — SODIUM CHLORIDE, PRESERVATIVE FREE 10 ML: 5 INJECTION INTRAVENOUS at 21:31

## 2021-08-19 RX ADMIN — INSULIN LISPRO 2 UNITS: 100 INJECTION, SOLUTION INTRAVENOUS; SUBCUTANEOUS at 16:20

## 2021-08-19 RX ADMIN — FENTANYL CITRATE 200 MCG/HR: 0.05 INJECTION, SOLUTION INTRAMUSCULAR; INTRAVENOUS at 10:15

## 2021-08-19 RX ADMIN — PROPOFOL 50 MCG/KG/MIN: 10 INJECTION, EMULSION INTRAVENOUS at 09:54

## 2021-08-19 RX ADMIN — FENTANYL CITRATE 200 MCG/HR: 0.05 INJECTION, SOLUTION INTRAMUSCULAR; INTRAVENOUS at 05:35

## 2021-08-19 RX ADMIN — CARBOXYMETHYLCELLULOSE SODIUM 1 DROP: 10 GEL OPHTHALMIC at 00:42

## 2021-08-19 RX ADMIN — CARBOXYMETHYLCELLULOSE SODIUM 1 DROP: 10 GEL OPHTHALMIC at 04:05

## 2021-08-19 RX ADMIN — INSULIN LISPRO 1 UNITS: 100 INJECTION, SOLUTION INTRAVENOUS; SUBCUTANEOUS at 21:31

## 2021-08-19 RX ADMIN — MIDAZOLAM 10 MG/HR: 5 INJECTION INTRAMUSCULAR; INTRAVENOUS at 20:34

## 2021-08-19 RX ADMIN — PAROXETINE HYDROCHLORIDE 40 MG: 20 TABLET, FILM COATED ORAL at 08:53

## 2021-08-19 RX ADMIN — MUPIROCIN: 20 OINTMENT TOPICAL at 08:55

## 2021-08-19 RX ADMIN — CARBOXYMETHYLCELLULOSE SODIUM 1 DROP: 10 GEL OPHTHALMIC at 12:01

## 2021-08-19 RX ADMIN — INSULIN LISPRO 2 UNITS: 100 INJECTION, SOLUTION INTRAVENOUS; SUBCUTANEOUS at 06:27

## 2021-08-19 RX ADMIN — CARBOXYMETHYLCELLULOSE SODIUM 1 DROP: 10 GEL OPHTHALMIC at 16:36

## 2021-08-19 RX ADMIN — Medication 15 ML: at 21:31

## 2021-08-19 RX ADMIN — FENTANYL CITRATE 200 MCG/HR: 0.05 INJECTION, SOLUTION INTRAMUSCULAR; INTRAVENOUS at 16:18

## 2021-08-19 RX ADMIN — METHYLPREDNISOLONE SODIUM SUCCINATE 40 MG: 40 INJECTION, POWDER, FOR SOLUTION INTRAMUSCULAR; INTRAVENOUS at 21:31

## 2021-08-19 RX ADMIN — SODIUM CHLORIDE, PRESERVATIVE FREE 10 ML: 5 INJECTION INTRAVENOUS at 08:53

## 2021-08-19 RX ADMIN — PROPOFOL 50 MCG/KG/MIN: 10 INJECTION, EMULSION INTRAVENOUS at 05:35

## 2021-08-19 RX ADMIN — MUPIROCIN: 20 OINTMENT TOPICAL at 21:31

## 2021-08-19 RX ADMIN — DOCUSATE SODIUM 50 MG AND SENNOSIDES 8.6 MG 2 TABLET: 8.6; 5 TABLET, FILM COATED ORAL at 08:53

## 2021-08-19 RX ADMIN — ACETAMINOPHEN 650 MG: 325 TABLET ORAL at 16:33

## 2021-08-19 RX ADMIN — INSULIN LISPRO 1 UNITS: 100 INJECTION, SOLUTION INTRAVENOUS; SUBCUTANEOUS at 10:01

## 2021-08-19 RX ADMIN — METHYLPREDNISOLONE SODIUM SUCCINATE 40 MG: 40 INJECTION, POWDER, FOR SOLUTION INTRAMUSCULAR; INTRAVENOUS at 08:52

## 2021-08-19 RX ADMIN — CARBOXYMETHYLCELLULOSE SODIUM 1 DROP: 10 GEL OPHTHALMIC at 21:31

## 2021-08-19 RX ADMIN — PROPOFOL 50 MCG/KG/MIN: 10 INJECTION, EMULSION INTRAVENOUS at 21:44

## 2021-08-19 RX ADMIN — ENOXAPARIN SODIUM 30 MG: 30 INJECTION SUBCUTANEOUS at 08:53

## 2021-08-19 ASSESSMENT — PULMONARY FUNCTION TESTS
PIF_VALUE: 33
PIF_VALUE: 32
PIF_VALUE: 28
PIF_VALUE: 30
PIF_VALUE: 33
PIF_VALUE: 30
PIF_VALUE: 32
PIF_VALUE: 31
PIF_VALUE: 34
PIF_VALUE: 32
PIF_VALUE: 33
PIF_VALUE: 35
PIF_VALUE: 33
PIF_VALUE: 32
PIF_VALUE: 34
PIF_VALUE: 30
PIF_VALUE: 31
PIF_VALUE: 31
PIF_VALUE: 33
PIF_VALUE: 32
PIF_VALUE: 33
PIF_VALUE: 38
PIF_VALUE: 34
PIF_VALUE: 36
PIF_VALUE: 32
PIF_VALUE: 32
PIF_VALUE: 35
PIF_VALUE: 30
PIF_VALUE: 34
PIF_VALUE: 37
PIF_VALUE: 34

## 2021-08-19 ASSESSMENT — PAIN SCALES - GENERAL
PAINLEVEL_OUTOF10: 0
PAINLEVEL_OUTOF10: 0

## 2021-08-19 NOTE — PROGRESS NOTES
Please see Dr. Atwood's recommendation.  She thinks it should be ok but would prefer clearance come from transplant.  Please call pt nephrologist for clearance.   Pulmonary & Critical Care Medicine ICU Progress Note      Reason for visit: Acute respiratory failure, severe COVID-19 pneumonia. Past history of asthma, anxiety, iron deficiency anemia. Events of Last 24 hours: Failed noninvasive ventilation, intubated 8/17/2021. Prolonged after intubation. Had a central line and arterial line placed. On propofol 50, fentanyl 200, Versed 8. Pulse 71/min, SaO2 91% on respiratory rate 20, , FiO2 100% and PEEP 12. Has an NG with tube feeds at 30 mL/h. Overnight, she was desaturating when she woke up. Has had low-grade fever. Invasive Lines:   CVC RIJ 8/17  Art Line 8/17        MV: 8/17/2021    Recent Labs     08/18/21  1458 08/19/21  0614   PHART 7.237* 7.310*   [id number] 64.7* 50.4*   PO2ART 63.3* 82.6       MV Settings:  Vent Mode: AC/VC+ Rate Set: 20 bmp/Vt Ordered: 400 mL/ Coral@[employer].Link Trigger)    IV:   midazolam 10 mg/hr (08/19/21 1105)    dextrose      propofol 50 mcg/kg/min (08/19/21 1353)    fentaNYL (SUBLIMAZE) infusion 200 mcg/hr (08/19/21 1618)    cisatracurium (NIMBEX) infusion Stopped (08/18/21 1207)    norepinephrine 1 mcg/min (08/18/21 1100)    sodium chloride         Vitals:  BP (!) 93/45   Pulse 72   Temp 100.5 °F (38.1 °C) (Core)   Resp 20   Ht 5' (1.524 m)   Wt 185 lb (83.9 kg)   LMP 04/15/2012   SpO2 97%   BMI 36.13 kg/m²          Intake/Output Summary (Last 24 hours) at 8/19/2021 1708  Last data filed at 8/19/2021 1600  Gross per 24 hour   Intake 2230.84 ml   Output 745 ml   Net 1485.84 ml       EXAM:  Due to the current efforts to prevent transmission of COVID-19 and also the need to preserve PPE for other caregivers, a face-to-face encounter with the patient was not performed. That being said, all relevant records and diagnostic tests were reviewed, including laboratory results and imaging. Please reference any relevant documentation elsewhere. Care will be coordinated with the primary service.       Medications:  Scheduled Meds:   sennosides-docusate sodium  2 tablet Oral Daily    insulin lispro  0-6 Units Subcutaneous Q4H    mupirocin   Nasal BID    pantoprazole  40 mg Intravenous Daily    carboxymethylcellulose PF  1 drop Both Eyes Q4H    chlorhexidine  15 mL Mouth/Throat BID    montelukast  10 mg Oral Nightly    PARoxetine  40 mg Oral Daily    sodium chloride flush  5-40 mL Intravenous 2 times per day    methylPREDNISolone  40 mg Intravenous Q12H    Vitamin D  2,000 Units Oral Daily    enoxaparin  30 mg Subcutaneous BID       PRN Meds:  midazolam, glucose, dextrose, glucagon (rDNA), dextrose, potassium chloride, guaiFENesin-dextromethorphan, benzocaine-menthol, zolpidem, sodium chloride flush, sodium chloride, ondansetron **OR** ondansetron, polyethylene glycol, acetaminophen **OR** acetaminophen    Results:  CBC:   Recent Labs     08/17/21  1519 08/18/21  0538 08/19/21  0615   WBC 3.5* 5.0 11.3*   HGB 12.2 13.2 13.0   HCT 34.8* 37.2 35.7*   MCV 90.7 91.1 91.6    278 396     BMP:   Recent Labs     08/17/21  1519 08/18/21  0538 08/19/21  0615    140 135*   K 3.4* 4.4 4.1    106 103   CO2 24 22 21   PHOS  --   --  3.9   BUN 30* 31* 38*   CREATININE 1.0 0.8 0.9     LIVER PROFILE:   No results for input(s): AST, ALT, LIPASE, BILIDIR, BILITOT, ALKPHOS in the last 72 hours. Invalid input(s): AMYLASE,  ALB  Cultures:      Films:  CXR reviewed by me       Assessment and plan:  Acute respiratory failure, hypoxemic. Intubated 8/7, stable, but with high FiO2 and PEEP  Acute COVID-19 pneumonia. Presented with GI symptoms and confusion, those had resolved but her respiratory status has worsened. ID following, received Actemra. On appropriate dose of steroid. Acute encephalopathy. Had improved, currently sedated  Abnormal LFT. Did have steatosis on CT, could be related to COVID-19 infection as well. Slightly better on repeat LFT. Hyperglycemia.   May need to be monitored and placed on SSI, better controlled  Leukopenia. Presently has leukocytosis, concern for superinfection  Hypokalemia. Replace and monitor  CINDY. On CPAP at home. Details not available. Address when she improves  Bronchial asthma. Mild intermittent as per the patient. Presently no wheezing. On Singulair, added as needed bronchodilator  Obesity. Address when she improves. DVT prophylaxis. On escalated dose of Lovenox at 30 mg SQ every 12 hours      Critical care time spent reviewing labs/films, examining patient, collaborating with other physicians but excluding procedures for life threatening organ failure is 35 minutes. Multidisciplinary rounds were completed at the bedside with participation from the intensivist, pharmacy, nursing, nutrition.   All labs and imaging studies reviewed, discussed        Electronically signed by:  Immanuel Ulloa MD    8/19/2021    5:08 PM.

## 2021-08-19 NOTE — PROGRESS NOTES
Pts sat at 80-82%. Pt not compliant with the ventilator. Sedation adjusted. Pt slow to recover. Pt returned to O2 sat in the mid 90%. Will continue to monitor.

## 2021-08-19 NOTE — PROGRESS NOTES
08/19/21 1120   Vent Information   Vent Type 980   Vent Mode AC/VC+   Vt Ordered 400 mL   Rate Set 20 bmp   Pressure Support 0 cmH20   FiO2  100 %   SpO2 (!) 84 %   SpO2/FiO2 ratio 84   Sensitivity 3   PEEP/CPAP 15   I Time/ I Time % 1.2 s   Vent Patient Data   Peak Inspiratory Pressure 35 cmH2O   Mean Airway Pressure 23 cmH20   Rate Measured 20 br/min   Vt Exhaled 405 mL   Minute Volume 8.11 Liters   I:E Ratio 1:1.50   Cough/Sputum   Sputum How Obtained Endotracheal   Cough Productive   Sputum Amount Moderate   Sputum Color Dark red   Tenacity Thin   Spontaneous Breathing Trial (SBT) RT Doc   Pulse 76   Additional Respiratory  Assessments   Resp 20   Alarm Settings   High Pressure Alarm 45 cmH2O   Low Minute Volume Alarm 2 L/min   High Respiratory Rate 40 br/min   Low Exhaled Vt  200 mL   ETT (adult)   Placement Date/Time: 08/17/21 1216   Tube Size: 7.5 mm  Laryngoscope: GlideScope  Blade Size: 4  Location: Oral  Placement Verified By[de-identified] Colorimetric EtCO2 device  Secured at: 23 cm  Placed By: Licensed provider   Secured at 23 cm   Measured From [address] [place] [address],[address] By Commercial tube stuart   Site Condition Dry   Cuff Pressure 30 cm H2O     Increased peep to 15 and I-time to make 1:1.5

## 2021-08-19 NOTE — PROGRESS NOTES
08/19/21 0803   Vent Information   Vent Type 980   Vent Mode AC/VC+   Vt Ordered 400 mL   Rate Set 20 bmp   Pressure Support 0 cmH20   FiO2  100 %   SpO2 (!) 89 %   SpO2/FiO2 ratio 89   Sensitivity 3   PEEP/CPAP 12   I Time/ I Time % 0.9 s   Humidification Source HME   Vent Patient Data   Peak Inspiratory Pressure 30 cmH2O   Mean Airway Pressure 20 cmH20   Rate Measured 23 br/min   Vt Exhaled 450 mL   Minute Volume 10.8 Liters   I:E Ratio 1:2.30   Cough/Sputum   Sputum How Obtained Oral;Endotracheal   Cough Congested;Productive;Strong   Sputum Amount Moderate   Sputum Color Cloudy   Tenacity Thick   Spontaneous Breathing Trial (SBT) RT Doc   Pulse 81   Breath Sounds   Right Upper Lobe Diminished   Right Middle Lobe Diminished   Right Lower Lobe Crackles   Left Upper Lobe Diminished   Left Lower Lobe Crackles   Additional Respiratory  Assessments   Resp 21   Position Semi-Beebe's   Oral Care Mouth suctioned   Alarm Settings   High Pressure Alarm 45 cmH2O   Low Minute Volume Alarm 2 L/min   High Respiratory Rate 40 br/min   Low Exhaled Vt  200 mL   ETT (adult)   Placement Date/Time: 08/17/21 1216   Tube Size: 7.5 mm  Laryngoscope: GlideScope  Blade Size: 4  Location: Oral  Placement Verified By[de-identified] Colorimetric EtCO2 device  Secured at: 23 cm  Placed By: Licensed provider   Secured at 23 cm   Measured From Lips   ET Placement Left   Secured By Commercial tube stuart   Site Condition Dry   Cuff Pressure 30 cm H2O   ambu/sx hob  PATIENT WAKES STARTLES EASILY ON LARGE AMOUNTS OF DIPRIVAN VERSED AND FENTANYL COUGHING WITH SUCTIONING AND DROPS SPO2 TO 60'S WITH DROPS IN PRESSURE TOO LIKELY VAGAL.  DID RECOVER BACK AFTER 3 MIN TO 88-90 THEN UP TO 96 CURRENTLY  RN AWARE

## 2021-08-19 NOTE — PLAN OF CARE
Nutrition Problem #1: Inadequate oral intake  Intervention: Food and/or Nutrient Delivery: Continue Current Tube Feeding  Nutritional Goals: Tolerate nutrition support at goal this admission without BG, electrolyte or GI disturbances.

## 2021-08-19 NOTE — PROGRESS NOTES
08/19/21 0409   Vent Information   Vent Type 980   Vent Mode AC/VC+   Vt Ordered 400 mL   Rate Set 20 bmp   Pressure Support 0 cmH20   FiO2  100 %   SpO2 98 %   SpO2/FiO2 ratio 98   Sensitivity 3   PEEP/CPAP 12   I Time/ I Time % 1.1 s   Vent Patient Data   Peak Inspiratory Pressure 30 cmH2O   Mean Airway Pressure 19 cmH20   Rate Measured 20 br/min   Vt Exhaled 412 mL   Minute Volume 8.21 Liters   I:E Ratio 1:1.70   Spontaneous Breathing Trial (SBT) RT Doc   Pulse 72   Additional Respiratory  Assessments   Resp 20   Alarm Settings   High Pressure Alarm 45 cmH2O   Low Minute Volume Alarm 2 L/min   High Respiratory Rate 40 br/min

## 2021-08-19 NOTE — PROGRESS NOTES
Comprehensive Nutrition Assessment    Type and Reason for Visit:  Reassess    Nutrition Recommendations/Plan:   1. Continue TF, goal is 30 mL/hr 2/2 high dose propofol   2. Recommend 60 mL H20 flush q 4 hours. Monitor IVF infusion, Na labs and need for adjustments in water flush  3. Recommend 2 Bottle Proteinex P2Go daily via syringe. Flush with 30 mL H20 before and after  4. Consider daily micronutrient supplementation: 2000 IU Vitamin D3, MVM, + Probiotic   5. Monitor TF tolerance (abd distention, bowel habits, N/V, cramping)  6. Continue to Check TG while on diprivan infusion  7. Monitor nutrition adequacy, pertinent labs, bowel habits, wt changes, and clinical progress    Nutrition Assessment:  Follow up: Pt remains on the vent. Sedated on propofol at 25 mL/hr (660 kcals), fentnayl and versed. TF infusing at 30 mL/hr currently and titrating towards goal. Labs reviewed. . Malnutrition Assessment:  Malnutrition Status: At risk for malnutrition (Comment)      Estimated Daily Nutrient Needs:  Energy (kcal):  5286-1265; Weight Used for Energy Requirements:  Ideal (45 kg)     Protein (g):   g; Weight Used for Protein Requirements:  Ideal (2-2.5)        Fluid (ml/day):  1 mL/kcal; Method Used for Fluid Requirements:  1 ml/kcal      Nutrition Related Findings:  . Last BM 8/16. Wounds:  None       Current Nutrition Therapies:    Diet NPO  ADULT TUBE FEEDING; Nasogastric; Peptide Based High Protein; Continuous; 10; Yes; 10; Q 6 hours; 55; 60; Q 4 hours   · Current Tube Feeding (TF) Orders:   · Feeding Route: Nasogastric  · Formula: Peptide Based High Protein  · Goal TF & Flush Orders Provides: Vital high protein at 30 mL/hr x 20 hrs to provide 600 mL TV, 600 kcals, 53 g protein and 502 mL free water. + 2 proteinex modular for added 208 kcals and 52 g protein.  + propofol calories    Anthropometric Measures:  · Height: 5' (152.4 cm)  · Current Body Weight: 185 lb (83.9 kg)   · Ideal Body

## 2021-08-19 NOTE — PROGRESS NOTES
08/19/21 1550   Vent Information   Vent Type 980   Vent Mode AC/VC+   Vt Ordered 400 mL   Rate Set 20 bmp   Pressure Support 0 cmH20   FiO2  85 %   SpO2 98 %   SpO2/FiO2 ratio 115.29   Sensitivity 3   PEEP/CPAP 15   I Time/ I Time % 1.2 s   Humidification Source HME   Vent Patient Data   Peak Inspiratory Pressure 34 cmH2O   Mean Airway Pressure 23 cmH20   Rate Measured 20 br/min   Vt Exhaled 402 mL   Minute Volume 8.05 Liters   I:E Ratio 1:1.50   Cough/Sputum   Sputum How Obtained Oral   Sputum Amount Moderate   Sputum Color Dark red;Brown   Spontaneous Breathing Trial (SBT) RT Doc   Pulse 69   Breath Sounds   Right Upper Lobe Diminished   Right Middle Lobe Diminished   Right Lower Lobe Crackles   Left Upper Lobe Diminished   Left Lower Lobe Crackles   Additional Respiratory  Assessments   Resp 20   Position Semi-Beebe's   Oral Care Mouth suctioned   Alarm Settings   High Pressure Alarm 45 cmH2O   Low Minute Volume Alarm 2 L/min   High Respiratory Rate 40 br/min   Low Exhaled Vt  200 mL   ETT (adult)   Placement Date/Time: 08/17/21 1216   Tube Size: 7.5 mm  Laryngoscope: GlideScope  Blade Size: 4  Location: Oral  Placement Verified By[de-identified] Colorimetric EtCO2 device  Secured at: 23 cm  Placed By: Licensed provider   Secured at 23 cm   Measured From Lips   ET Placement Left   Secured By Commercial tube stuart   Site Condition Dry   Cuff Pressure 30 cm H2O

## 2021-08-19 NOTE — PROGRESS NOTES
Isma Smith admitted 2021  7:13 PM FOR nausea/vomiting and recent dx  +COVID. Increased oxygen needs overnight, RR called for respiratory status, placed on AVAPS and transferred to PCU. Ultimately transferred to ICU requiring intubation  with declining respiratory status. Patient seen and examined today during multidisciplinary ICU rounds  Intubated, sedated with increased sedation needs overnight    Past Medical History:   Diagnosis Date    Anxiety     Asthma     COVID-19 2021    Haemorrhoids in the puerperium, delivered, with mention of postpartum complication     Iron deficiency     Menorrhagia       Past Surgical History:   Procedure Laterality Date    BREAST REDUCTION SURGERY      LAPAROSCOPY      TONSILLECTOMY      ULNA OSTEOTOMY          BP (!) 93/45   Pulse 76   Temp 99.4 °F (37.4 °C) (Core)   Resp 20   Ht 5' (1.524 m)   Wt 185 lb (83.9 kg)   LMP 04/15/2012   SpO2 (!) 84%   BMI 36.13 kg/m²     CVC Triple Lumen 21 Right Internal jugular (Active)   Placement Date/Time: 21 1230   Pre-existing: No  Timeout: Patient;Procedure;Site/Side;Appropriate Equipment; Consent Confirmed;Sterile Technique using full body drape;Site prepped with chlorhexidine;Sterile drsg with biopatch; Availability of Impl. .. ETT (adult) (Active)   Placement Date/Time: 21 1216   Tube Size: 7.5 mm  Laryngoscope: GlideScope  Blade Size: 4  Location: Oral  Placement Verified By[de-identified] Colorimetric EtCO2 device  Secured at: 23 cm  Placed By: Licensed provider       Arterial Line 21 Right Brachial (Active)   Placement Date/Time: 21 1230   Allens Test: Yes  Timeout: Patient;Procedure;Site/Side;Appropriate Equipment; Consent Confirmed;Sterile Technique using full body drape;Site prepped with chlorhexidine;Sterile drsg with biopatch; Availability of Impl. .. Plan:  MV D3 FiO2 100/12, supine since . AB.31/50/82/25  Sedated with propofol/fentanyl/versed, paralytic off. Transient hypotension but not currently requiring pressors  Continue IV steroids for severe COVID, received toci 8/16. Tolerating enteral feeds, glycemic control acceptable, on bowel regimen  Droplet isolation as ordered. Discussed with Dr. Gamal Mina today    DVT ppx: lovenox  GI ppx: protonix    Michelle Sullivan, APRN-CNP

## 2021-08-19 NOTE — PROGRESS NOTES
08/19/21 0102   Vent Information   $Ventilation $Subsequent Day   Vent Type 980   Vent Mode AC/VC   Vt Ordered 400 mL   Rate Set 20 bmp   Peak Flow 40 L/min   Pressure Support 0 cmH20   FiO2  90 %   SpO2 98 %   SpO2/FiO2 ratio 108.89   Sensitivity 3   PEEP/CPAP 12   Humidification Source HME   Vent Patient Data   Peak Inspiratory Pressure 28 cmH2O   Mean Airway Pressure 17 cmH20   Rate Measured 20 br/min   Vt Exhaled 415 mL   Minute Volume 8.42 Liters   I:E Ratio 1:1.70   Cough/Sputum   Sputum How Obtained None   Spontaneous Breathing Trial (SBT) RT Doc   Pulse 78   Additional Respiratory  Assessments   Resp 21   Position Semi-Beebe's   Subglottic Suction Done?  No   Alarm Settings   High Pressure Alarm 45 cmH2O   Low Minute Volume Alarm 2 L/min   Apnea (secs) 20 secs   High Respiratory Rate 40 br/min   Low Exhaled Vt  200 mL   ETT (adult)   Placement Date/Time: 08/17/21 1216   Tube Size: 7.5 mm  Laryngoscope: GlideScope  Blade Size: 4  Location: Oral  Placement Verified By[de-identified] Colorimetric EtCO2 device  Secured at: 23 cm  Placed By: Licensed provider   Secured at 24 cm   Measured From [address] By Commercial tube stuart   Site Condition Dry

## 2021-08-19 NOTE — PROGRESS NOTES
08/18/21 2037   Vent Information   Vent Type 980   Vt Ordered 400 mL   Rate Set 20 bmp   Peak Flow 40 L/min   Pressure Support 0 cmH20   FiO2  90 %   SpO2 (!) 83 %   SpO2/FiO2 ratio 92.22   Sensitivity 3   PEEP/CPAP 12   Humidification Source HME   Vent Patient Data   Peak Inspiratory Pressure 19 cmH2O   Mean Airway Pressure 9 cmH20   Rate Measured 25 br/min   Vt Exhaled 403 mL   Minute Volume 10.19 Liters   I:E Ratio 1:1.30   Cough/Sputum   Sputum How Obtained Endotracheal   Cough Congested;Strong;Productive   Sputum Amount Small   Sputum Color Cloudy   Tenacity Thin   Spontaneous Breathing Trial (SBT) RT Doc   Pulse 91   Additional Respiratory  Assessments   Resp 27   Position Semi-Beebe's   Oral Care Completed? Yes   Oral Care Mouth suctioned   Subglottic Suction Done?  Yes   Cuff Pressure (cm H2O) 35 cm H2O   Alarm Settings   High Pressure Alarm 45 cmH2O   Low Minute Volume Alarm 2 L/min   Apnea (secs) 20 secs   High Respiratory Rate 40 br/min   Low Exhaled Vt  200 mL   ETT (adult)   Placement Date/Time: 08/17/21 1216   Tube Size: 7.5 mm  Laryngoscope: GlideScope  Blade Size: 4  Location: Oral  Placement Verified By[de-identified] Colorimetric EtCO2 device  Secured at: 23 cm  Placed By: Licensed provider   Secured at 24 cm   Measured From [address] [place] [address],[address] By Commercial tube stuart   Site Condition Dry

## 2021-08-19 NOTE — ACP (ADVANCE CARE PLANNING)
Before intubation, patient gave contact as Baudilio Moreno (friend). She did wish to be intubated (per Bri Weaver, AARON who had discussion with her. She has no other contacts listed - unable to complete ACP, as a result.     Chaya Ventura RN

## 2021-08-19 NOTE — PROGRESS NOTES
Pts O2 sat at 80% again. MD notified that pt is waking up, not tolerating the ventilator, and maxed on all sedation. MD ordered versed gtt and propofol gtt to be lowed d/t high triglycerides. Will implement orders. Complex Repair And W Plasty Text: The defect edges were debeveled with a #15 scalpel blade.  The primary defect was closed partially with a complex linear closure.  Given the location of the remaining defect, shape of the defect and the proximity to free margins a W plasty was deemed most appropriate for complete closure of the defect.  Using a sterile surgical marker, an appropriate advancement flap was drawn incorporating the defect and placing the expected incisions within the relaxed skin tension lines where possible.    The area thus outlined was incised deep to adipose tissue with a #15 scalpel blade.  The skin margins were undermined to an appropriate distance in all directions utilizing iris scissors.

## 2021-08-19 NOTE — PLAN OF CARE
Problem: Airway Clearance - Ineffective  Goal: Achieve or maintain patent airway  8/19/2021 0139 by Quang Mcwilliams RN  Outcome: Ongoing  8/19/2021 0138 by Quang Mcwilliams RN  Outcome: Ongoing     Problem: Gas Exchange - Impaired  Goal: Absence of hypoxia  8/19/2021 0139 by Quang Mcwilliams RN  Outcome: Ongoing  8/19/2021 0138 by Quang Mcwilliams RN  Outcome: Ongoing  Goal: Promote optimal lung function  8/19/2021 0139 by Quang Mcwilliams RN  Outcome: Ongoing  8/19/2021 0138 by Quang Mcwilliams RN  Outcome: Ongoing     Problem: Breathing Pattern - Ineffective  Goal: Ability to achieve and maintain a regular respiratory rate  8/19/2021 0139 by Quang Mcwilliams RN  Outcome: Ongoing  8/19/2021 0138 by Quang Mcwilliams RN  Outcome: Ongoing     Problem:  Body Temperature -  Risk of, Imbalanced  Goal: Ability to maintain a body temperature within defined limits  8/19/2021 0139 by Quang Mcwilliams RN  Outcome: Ongoing  8/19/2021 0138 by Quang Mcwilliams RN  Outcome: Ongoing  Goal: Will regain or maintain usual level of consciousness  8/19/2021 0139 by Quang Mcwilliams RN  Outcome: Ongoing  8/19/2021 0138 by Quang Mcwilliams RN  Outcome: Ongoing  Goal: Complications related to the disease process, condition or treatment will be avoided or minimized  8/19/2021 0139 by Quang Mcwilliams RN  Outcome: Ongoing  8/19/2021 0138 by Quang Mcwilliams RN  Outcome: Ongoing     Problem: Isolation Precautions - Risk of Spread of Infection  Goal: Prevent transmission of infection  8/19/2021 0139 by Quang Mcwilliams RN  Outcome: Ongoing  8/19/2021 0138 by Quang Mcwilliams RN  Outcome: Ongoing     Problem: Nutrition Deficits  Goal: Optimize nutritional status  8/19/2021 0139 by Quang Mcwilliams RN  Outcome: Ongoing  8/19/2021 0138 by Quang Mcwilliams RN  Outcome: Ongoing     Problem: Risk for Fluid Volume Deficit  Goal: Maintain normal heart rhythm  8/19/2021 0139 by Quang Mcwilliams RN  Outcome: Ongoing  8/19/2021 0138 by Quang Mcwilliams, AMY  Outcome: Ongoing  Goal: Maintain absence of muscle cramping  8/19/2021 0139 by Lorne Calabrese RN  Outcome: Ongoing  8/19/2021 0138 by Lorne Calabrese RN  Outcome: Ongoing  Goal: Maintain normal serum potassium, sodium, calcium, phosphorus, and pH  8/19/2021 0139 by Lorne Calabrese RN  Outcome: Ongoing  8/19/2021 0138 by Lorne Calabrese RN  Outcome: Ongoing     Problem: Loneliness or Risk for Loneliness  Goal: Demonstrate positive use of time alone when socialization is not possible  8/19/2021 0139 by Lorne Calabrese RN  Outcome: Ongoing  8/19/2021 0138 by Lorne Calabrese RN  Outcome: Ongoing     Problem: Fatigue  Goal: Verbalize increase energy and improved vitality  8/19/2021 0139 by Lorne Calabrese RN  Outcome: Ongoing  8/19/2021 0138 by Lorne Calabrese RN  Outcome: Ongoing     Problem: Patient Education: Go to Patient Education Activity  Goal: Patient/Family Education  8/19/2021 0139 by Lorne Calabrese RN  Outcome: Ongoing  8/19/2021 0138 by Lorne Calabrese RN  Outcome: Ongoing     Problem: Falls - Risk of:  Goal: Will remain free from falls  Description: Will remain free from falls  8/19/2021 0139 by Lorne Calabrese RN  Outcome: Ongoing  8/19/2021 0138 by Lorne Calabrese RN  Outcome: Ongoing  Goal: Absence of physical injury  Description: Absence of physical injury  8/19/2021 0139 by Lorne Calabrese RN  Outcome: Ongoing  8/19/2021 0138 by Lorne Calabrese RN  Outcome: Ongoing     Problem: Skin Integrity:  Goal: Will show no infection signs and symptoms  Description: Will show no infection signs and symptoms  8/19/2021 0139 by Lorne Calabrese RN  Outcome: Ongoing  8/19/2021 0138 by Lorne Calabrese RN  Outcome: Ongoing  Goal: Absence of new skin breakdown  Description: Absence of new skin breakdown  8/19/2021 0139 by Lorne Calabrese RN  Outcome: Ongoing  8/19/2021 0138 by Lorne Calabrese RN  Outcome: Ongoing     Problem: Nutrition  Goal: Optimal nutrition therapy  8/19/2021 0139 by Lorne Calabrese RN  Outcome: Ongoing  8/19/2021 0138 by Lorne Calabrese RN  Outcome: Ongoing     Problem: Non-Violent Restraints  Goal: Removal from restraints as soon as assessed to be safe  Outcome: Completed  Goal: No harm/injury to patient while restraints in use  Outcome: Completed  Goal: Patient's dignity will be maintained  Outcome: Completed

## 2021-08-19 NOTE — PROGRESS NOTES
Pt's sat at 77%. Sedation adjusted. Pt is now maxed on all sedation. Pt slow to recover. Will continue to monitor.

## 2021-08-19 NOTE — PROGRESS NOTES
Hospitalist Progress Note      PCP: No primary care provider on file. Date of Admission: 8/12/2021    Chief Complaint:   covid/dehydration     Hospital Course: reviewed      Subjective: remains intubated, sedated      Medications:  Reviewed    Infusion Medications    midazolam 8 mg/hr (08/19/21 0614)    dextrose      propofol 50 mcg/kg/min (08/19/21 4595)    fentaNYL (SUBLIMAZE) infusion 200 mcg/hr (08/19/21 2008)    cisatracurium (NIMBEX) infusion Stopped (08/18/21 1207)    norepinephrine 1 mcg/min (08/18/21 1100)    sodium chloride       Scheduled Medications    sennosides-docusate sodium  2 tablet Oral Daily    insulin lispro  0-6 Units Subcutaneous Q4H    mupirocin   Nasal BID    pantoprazole  40 mg Intravenous Daily    carboxymethylcellulose PF  1 drop Both Eyes Q4H    chlorhexidine  15 mL Mouth/Throat BID    montelukast  10 mg Oral Nightly    PARoxetine  40 mg Oral Daily    sodium chloride flush  5-40 mL Intravenous 2 times per day    methylPREDNISolone  40 mg Intravenous Q12H    Vitamin D  2,000 Units Oral Daily    enoxaparin  30 mg Subcutaneous BID     PRN Meds: midazolam, glucose, dextrose, glucagon (rDNA), dextrose, potassium chloride, guaiFENesin-dextromethorphan, benzocaine-menthol, zolpidem, sodium chloride flush, sodium chloride, ondansetron **OR** ondansetron, polyethylene glycol, acetaminophen **OR** acetaminophen      Intake/Output Summary (Last 24 hours) at 8/19/2021 0847  Last data filed at 8/19/2021 4447  Gross per 24 hour   Intake 1195.02 ml   Output 505 ml   Net 690.02 ml       Physical Exam Performed:    BP (!) 117/51   Pulse 81   Temp 100 °F (37.8 °C) (Bladder)   Resp 21   Ht 5' (1.524 m)   Wt 185 lb (83.9 kg)   LMP 04/15/2012   SpO2 (!) 89%   BMI 36.13 kg/m²      General appearance:  no distress today, appears stated age and cooperative. intubated, no longer proned  HEENT:  Normal cephalic, atraumatic without obvious deformity.  Pupils equal, round, and reactive to light.  Extra ocular muscles intact. Conjunctivae/corneas clear. Neck: Supple, with full range of motion. No jugular venous distention. Trachea midline. Respiratory:  inc'd respiratory effort. Clear to auscultation, bilaterally without Wheezes/Rhonchi. Mild bibas rales  Cardiovascular:  Regular rate and rhythm with normal S1/S2 without murmurs, rubs or gallops. Abdomen: Soft, non-tender, non-distended with normal bowel sounds. Musculoskeletal:  No clubbing, cyanosis or edema bilaterally.  Full range of motion without deformity. Skin: Skin color, texture, turgor normal.  No rashes or lesions. Neurologic:  Neurovascularly intact without any focal sensory/motor deficits. Cranial nerves: II-XII intact, grossly non-focal.  Psychiatric:  Alert and oriented x0, sedated  Capillary Refill: Brisk,3 seconds, normal  Peripheral Pulses: +2 palpable, equal bilaterally          Labs:   Recent Labs     08/17/21  1519 08/18/21  0538 08/19/21  0615   WBC 3.5* 5.0 11.3*   HGB 12.2 13.2 13.0   HCT 34.8* 37.2 35.7*    278 396     Recent Labs     08/17/21  1519 08/18/21  0538 08/19/21  0615    140 135*   K 3.4* 4.4 4.1    106 103   CO2 24 22 21   BUN 30* 31* 38*   CREATININE 1.0 0.8 0.9   CALCIUM 8.0* 8.2* 8.2*   PHOS  --   --  3.9     Recent Labs     08/16/21  1309   AST 50*   ALT 43*   BILITOT 0.4   ALKPHOS 58     No results for input(s): INR in the last 72 hours. No results for input(s): Mike Chaka in the last 72 hours. Urinalysis:      Lab Results   Component Value Date    NITRU Negative 08/12/2021    WBCUA 10-20 08/12/2021    BACTERIA 1+ 08/12/2021    RBCUA 3-4 08/12/2021    BLOODU TRACE-INTACT 08/12/2021    SPECGRAV >=1.030 08/12/2021    GLUCOSEU Negative 08/12/2021       Radiology:  XR ABDOMEN FOR NG/OG/NE TUBE PLACEMENT   Final Result   Status post placement of nasogastric tube with distal tip located in the   region the stomach as described above.          XR CHEST PORTABLE   Final Result   1. Status post placement of endotracheal tube with distal tip located   approximately 3.5 cm above the elsi. 2. Status post placement of right internal jugular central line with distal   tip located near junction of superior vena cava and right atrium. No   evidence of pneumothorax. XR CHEST PORTABLE   Final Result   Diffuse bilateral airspace disease. Aeration is improved compared to prior   study. XR CHEST PORTABLE   Final Result   Significant worsening in appearance of bilateral pneumonia         CT Head WO Contrast   Final Result   No acute intracranial abnormality. CT ABDOMEN PELVIS W IV CONTRAST Additional Contrast? None   Final Result   1. Mucosal thickening and enhancement of the terminal ileum, proximal colon   and sigmoid colon/rectum. Pattern is nonspecific and suggests underlying   enterocolitis. Infectious or inflammatory etiologies may be considered. 2. No pattern of bowel obstruction. 3. Hepatic steatosis. 4. Airspace changes in the lower chest likely represent underlying viral   pneumonia. XR CHEST PORTABLE   Final Result   Perihilar opacities could represent COVID pneumonia given indication. Correlate with presentation to exclude superimposed congestive heart failure.                  Assessment/Plan:    Active Hospital Problems    Diagnosis     Acute respiratory failure with hypoxia (HCC) [J96.01]     Altered mental status [R41.82]     Mild intermittent asthma without complication [F28.48]     CINDY on CPAP [G47.33, Z99.89]     Elevated transaminase level [R74.01]     Hepatic steatosis [K76.0]     Obesity (BMI 35.0-39.9 without comorbidity) [E66.9]     COVID-19 [U07.1]           Sepsis - due to covid, with tachypnea/tachycardia and +rapid test, pt was recently diagnosed on 8/11  -supportive care  -ivfs given  -lactate was wnl  -no abx given viral etiology     Acute encephalopathy- seemed to have improved with ivfs, due to ?covid, CThead no acute abn  -tele  -neurochecks     Acute resp failure- with sats 88% ra in ER per staff, likely from covid, pt is unvaccinated  -iv solumedrol q12h ordered  -supportive care  -tele  -ID consulted, apprec recs, Per ID note( Date of symptom onset 8/7/21, Date of diagnosis and admission 8/13/21 (date of outpt +test unclear))  - started on Actemra on 8/16   -pulm consulted given worsening   -intubated 8/17, proned as well     N/v/abd pain/diarrhea- likely from covid, CTa/p done(suggestive of enterocolitis, no obstruction, noted hepatiac steatosis)  -continue supportive care  - cdiff negative  -ua was abn and ucx > 50k mixed mike, no UTI at this time     Asthma- on home singulair     Anxiety-continued paxil        DVT Prophylaxis: lovenox bid  Diet: Diet NPO  ADULT TUBE FEEDING; Nasogastric; Peptide Based High Protein; Continuous; 10; Yes; 10; Q 6 hours; 55; 60; Q 4 hours  Code Status: Full Code    PT/OT Eval Status: not possible     Dispo -unclear, icu care    Carmen Gonzalez MD

## 2021-08-19 NOTE — PROGRESS NOTES
Assumed care of pt. Received report via [facility]. VSS with pt being mechanically ventilated. Pt on levo for BP support. Pt tolerating mechanically ventilation. 4 eye skin assessment complete. MAR handoff complete. Lines verified. Pt repositioned and resting comfortably with continuous sedation. Shift assessment to follow. Will continue to monitor.

## 2021-08-20 LAB
ALBUMIN SERPL-MCNC: 2.8 G/DL (ref 3.4–5)
ANION GAP SERPL CALCULATED.3IONS-SCNC: 10 MMOL/L (ref 3–16)
BASE EXCESS ARTERIAL: 2 (ref -3–3)
BUN BLDV-MCNC: 31 MG/DL (ref 7–20)
CALCIUM SERPL-MCNC: 7.6 MG/DL (ref 8.3–10.6)
CHLORIDE BLD-SCNC: 102 MMOL/L (ref 99–110)
CO2: 24 MMOL/L (ref 21–32)
CREAT SERPL-MCNC: 0.8 MG/DL (ref 0.6–1.2)
GFR AFRICAN AMERICAN: >60
GFR NON-AFRICAN AMERICAN: >60
GLUCOSE BLD-MCNC: 131 MG/DL (ref 70–99)
GLUCOSE BLD-MCNC: 177 MG/DL (ref 70–99)
GLUCOSE BLD-MCNC: 186 MG/DL (ref 70–99)
GLUCOSE BLD-MCNC: 191 MG/DL (ref 70–99)
GLUCOSE BLD-MCNC: 218 MG/DL (ref 70–99)
GLUCOSE BLD-MCNC: 240 MG/DL (ref 70–99)
GLUCOSE BLD-MCNC: 245 MG/DL (ref 70–99)
HCO3 ARTERIAL: 28.5 MMOL/L (ref 21–29)
O2 SAT, ARTERIAL: 94 % (ref 93–100)
PCO2 ARTERIAL: 56.6 MM HG (ref 35–45)
PERFORMED ON: ABNORMAL
PH ARTERIAL: 7.31 (ref 7.35–7.45)
PHOSPHORUS: 3.3 MG/DL (ref 2.5–4.9)
PO2 ARTERIAL: 78.6 MM HG (ref 75–108)
POC SAMPLE TYPE: ABNORMAL
POTASSIUM SERPL-SCNC: 4.5 MMOL/L (ref 3.5–5.1)
SODIUM BLD-SCNC: 136 MMOL/L (ref 136–145)
TCO2 ARTERIAL: 30 MMOL/L
TRIGL SERPL-MCNC: 2106 MG/DL (ref 0–150)

## 2021-08-20 PROCEDURE — 6360000002 HC RX W HCPCS: Performed by: INTERNAL MEDICINE

## 2021-08-20 PROCEDURE — 2580000003 HC RX 258: Performed by: INTERNAL MEDICINE

## 2021-08-20 PROCEDURE — 6370000000 HC RX 637 (ALT 250 FOR IP): Performed by: NURSE PRACTITIONER

## 2021-08-20 PROCEDURE — 2580000003 HC RX 258: Performed by: NURSE PRACTITIONER

## 2021-08-20 PROCEDURE — 80069 RENAL FUNCTION PANEL: CPT

## 2021-08-20 PROCEDURE — 6370000000 HC RX 637 (ALT 250 FOR IP): Performed by: INTERNAL MEDICINE

## 2021-08-20 PROCEDURE — 2000000000 HC ICU R&B

## 2021-08-20 PROCEDURE — 94761 N-INVAS EAR/PLS OXIMETRY MLT: CPT

## 2021-08-20 PROCEDURE — 6360000002 HC RX W HCPCS: Performed by: NURSE PRACTITIONER

## 2021-08-20 PROCEDURE — C9113 INJ PANTOPRAZOLE SODIUM, VIA: HCPCS | Performed by: NURSE PRACTITIONER

## 2021-08-20 PROCEDURE — 2700000000 HC OXYGEN THERAPY PER DAY

## 2021-08-20 PROCEDURE — APPNB45 APP NON BILLABLE 31-45 MINUTES: Performed by: NURSE PRACTITIONER

## 2021-08-20 PROCEDURE — 2500000003 HC RX 250 WO HCPCS: Performed by: INTERNAL MEDICINE

## 2021-08-20 PROCEDURE — 82803 BLOOD GASES ANY COMBINATION: CPT

## 2021-08-20 PROCEDURE — 94003 VENT MGMT INPAT SUBQ DAY: CPT

## 2021-08-20 RX ORDER — DEXMEDETOMIDINE HYDROCHLORIDE 4 UG/ML
.2-1.4 INJECTION, SOLUTION INTRAVENOUS CONTINUOUS
Status: DISCONTINUED | OUTPATIENT
Start: 2021-08-20 | End: 2021-08-25

## 2021-08-20 RX ORDER — ROCURONIUM BROMIDE 10 MG/ML
25 INJECTION, SOLUTION INTRAVENOUS
Status: DISCONTINUED | OUTPATIENT
Start: 2021-08-20 | End: 2021-08-23

## 2021-08-20 RX ORDER — OXYCODONE HCL 5 MG/5 ML
5 SOLUTION, ORAL ORAL EVERY 6 HOURS SCHEDULED
Status: DISPENSED | OUTPATIENT
Start: 2021-08-20 | End: 2021-08-22

## 2021-08-20 RX ADMIN — FENTANYL CITRATE 200 MCG/HR: 0.05 INJECTION, SOLUTION INTRAMUSCULAR; INTRAVENOUS at 08:30

## 2021-08-20 RX ADMIN — INSULIN LISPRO 2 UNITS: 100 INJECTION, SOLUTION INTRAVENOUS; SUBCUTANEOUS at 00:55

## 2021-08-20 RX ADMIN — CARBOXYMETHYLCELLULOSE SODIUM 1 DROP: 10 GEL OPHTHALMIC at 12:18

## 2021-08-20 RX ADMIN — FENTANYL CITRATE 200 MCG/HR: 0.05 INJECTION, SOLUTION INTRAMUSCULAR; INTRAVENOUS at 19:05

## 2021-08-20 RX ADMIN — OXYCODONE HYDROCHLORIDE 5 MG: 5 SOLUTION ORAL at 12:13

## 2021-08-20 RX ADMIN — OXYCODONE HYDROCHLORIDE 5 MG: 5 SOLUTION ORAL at 23:24

## 2021-08-20 RX ADMIN — MUPIROCIN: 20 OINTMENT TOPICAL at 08:31

## 2021-08-20 RX ADMIN — MIDAZOLAM 10 MG/HR: 5 INJECTION INTRAMUSCULAR; INTRAVENOUS at 17:37

## 2021-08-20 RX ADMIN — ENOXAPARIN SODIUM 30 MG: 30 INJECTION SUBCUTANEOUS at 08:30

## 2021-08-20 RX ADMIN — OXYCODONE HYDROCHLORIDE 5 MG: 5 SOLUTION ORAL at 17:45

## 2021-08-20 RX ADMIN — Medication 15 ML: at 08:31

## 2021-08-20 RX ADMIN — Medication 2000 UNITS: at 08:29

## 2021-08-20 RX ADMIN — SODIUM CHLORIDE, PRESERVATIVE FREE 10 ML: 5 INJECTION INTRAVENOUS at 08:30

## 2021-08-20 RX ADMIN — INSULIN LISPRO 1 UNITS: 100 INJECTION, SOLUTION INTRAVENOUS; SUBCUTANEOUS at 15:58

## 2021-08-20 RX ADMIN — FENTANYL CITRATE 200 MCG/HR: 0.05 INJECTION, SOLUTION INTRAMUSCULAR; INTRAVENOUS at 04:00

## 2021-08-20 RX ADMIN — INSULIN LISPRO 1 UNITS: 100 INJECTION, SOLUTION INTRAVENOUS; SUBCUTANEOUS at 12:18

## 2021-08-20 RX ADMIN — PANTOPRAZOLE SODIUM 40 MG: 40 INJECTION, POWDER, FOR SOLUTION INTRAVENOUS at 08:29

## 2021-08-20 RX ADMIN — MUPIROCIN: 20 OINTMENT TOPICAL at 20:57

## 2021-08-20 RX ADMIN — CARBOXYMETHYLCELLULOSE SODIUM 1 DROP: 10 GEL OPHTHALMIC at 00:46

## 2021-08-20 RX ADMIN — PROPOFOL 50 MCG/KG/MIN: 10 INJECTION, EMULSION INTRAVENOUS at 15:56

## 2021-08-20 RX ADMIN — SODIUM CHLORIDE, PRESERVATIVE FREE 10 ML: 5 INJECTION INTRAVENOUS at 20:58

## 2021-08-20 RX ADMIN — CARBOXYMETHYLCELLULOSE SODIUM 1 DROP: 10 GEL OPHTHALMIC at 23:24

## 2021-08-20 RX ADMIN — PAROXETINE HYDROCHLORIDE 40 MG: 20 TABLET, FILM COATED ORAL at 08:29

## 2021-08-20 RX ADMIN — INSULIN LISPRO 2 UNITS: 100 INJECTION, SOLUTION INTRAVENOUS; SUBCUTANEOUS at 05:24

## 2021-08-20 RX ADMIN — CARBOXYMETHYLCELLULOSE SODIUM 1 DROP: 10 GEL OPHTHALMIC at 08:31

## 2021-08-20 RX ADMIN — Medication 0.2 MCG/KG/HR: at 23:25

## 2021-08-20 RX ADMIN — METHYLPREDNISOLONE SODIUM SUCCINATE 40 MG: 40 INJECTION, POWDER, FOR SOLUTION INTRAMUSCULAR; INTRAVENOUS at 20:57

## 2021-08-20 RX ADMIN — DOCUSATE SODIUM 50 MG AND SENNOSIDES 8.6 MG 2 TABLET: 8.6; 5 TABLET, FILM COATED ORAL at 08:29

## 2021-08-20 RX ADMIN — PROPOFOL 50 MCG/KG/MIN: 10 INJECTION, EMULSION INTRAVENOUS at 20:26

## 2021-08-20 RX ADMIN — CARBOXYMETHYLCELLULOSE SODIUM 1 DROP: 10 GEL OPHTHALMIC at 15:54

## 2021-08-20 RX ADMIN — ROCURONIUM BROMIDE 25 MG: 10 SOLUTION INTRAVENOUS at 22:54

## 2021-08-20 RX ADMIN — PROPOFOL 50 MCG/KG/MIN: 10 INJECTION, EMULSION INTRAVENOUS at 12:16

## 2021-08-20 RX ADMIN — INSULIN LISPRO 1 UNITS: 100 INJECTION, SOLUTION INTRAVENOUS; SUBCUTANEOUS at 23:56

## 2021-08-20 RX ADMIN — CARBOXYMETHYLCELLULOSE SODIUM 1 DROP: 10 GEL OPHTHALMIC at 04:31

## 2021-08-20 RX ADMIN — PROPOFOL 50 MCG/KG/MIN: 10 INJECTION, EMULSION INTRAVENOUS at 01:00

## 2021-08-20 RX ADMIN — MIDAZOLAM 10 MG/HR: 5 INJECTION INTRAMUSCULAR; INTRAVENOUS at 07:12

## 2021-08-20 RX ADMIN — ENOXAPARIN SODIUM 30 MG: 30 INJECTION SUBCUTANEOUS at 20:58

## 2021-08-20 RX ADMIN — METHYLPREDNISOLONE SODIUM SUCCINATE 40 MG: 40 INJECTION, POWDER, FOR SOLUTION INTRAMUSCULAR; INTRAVENOUS at 08:29

## 2021-08-20 RX ADMIN — MONTELUKAST SODIUM 10 MG: 10 TABLET ORAL at 20:58

## 2021-08-20 RX ADMIN — FENTANYL CITRATE 200 MCG/HR: 0.05 INJECTION, SOLUTION INTRAMUSCULAR; INTRAVENOUS at 14:20

## 2021-08-20 RX ADMIN — PROPOFOL 50 MCG/KG/MIN: 10 INJECTION, EMULSION INTRAVENOUS at 05:18

## 2021-08-20 RX ADMIN — INSULIN LISPRO 1 UNITS: 100 INJECTION, SOLUTION INTRAVENOUS; SUBCUTANEOUS at 08:57

## 2021-08-20 RX ADMIN — Medication 15 ML: at 20:57

## 2021-08-20 RX ADMIN — CARBOXYMETHYLCELLULOSE SODIUM 1 DROP: 10 GEL OPHTHALMIC at 20:56

## 2021-08-20 RX ADMIN — ACETAMINOPHEN 650 MG: 325 TABLET ORAL at 20:57

## 2021-08-20 ASSESSMENT — PULMONARY FUNCTION TESTS
PIF_VALUE: 34
PIF_VALUE: 34
PIF_VALUE: 30
PIF_VALUE: 30
PIF_VALUE: 34
PIF_VALUE: 33
PIF_VALUE: 31
PIF_VALUE: 33
PIF_VALUE: 33
PIF_VALUE: 29
PIF_VALUE: 32
PIF_VALUE: 34
PIF_VALUE: 29
PIF_VALUE: 33
PIF_VALUE: 29
PIF_VALUE: 29
PIF_VALUE: 32
PIF_VALUE: 33
PIF_VALUE: 29
PIF_VALUE: 33
PIF_VALUE: 34
PIF_VALUE: 30
PIF_VALUE: 32
PIF_VALUE: 30
PIF_VALUE: 30
PIF_VALUE: 32
PIF_VALUE: 34
PIF_VALUE: 33
PIF_VALUE: 33
PIF_VALUE: 32
PIF_VALUE: 31
PIF_VALUE: 32
PIF_VALUE: 33
PIF_VALUE: 33
PIF_VALUE: 32
PIF_VALUE: 36
PIF_VALUE: 32
PIF_VALUE: 33
PIF_VALUE: 30
PIF_VALUE: 32
PIF_VALUE: 43
PIF_VALUE: 30
PIF_VALUE: 32
PIF_VALUE: 31
PIF_VALUE: 31
PIF_VALUE: 34
PIF_VALUE: 33
PIF_VALUE: 34
PIF_VALUE: 32
PIF_VALUE: 33
PIF_VALUE: 30
PIF_VALUE: 38
PIF_VALUE: 33
PIF_VALUE: 32
PIF_VALUE: 31
PIF_VALUE: 39
PIF_VALUE: 34
PIF_VALUE: 30
PIF_VALUE: 34
PIF_VALUE: 42
PIF_VALUE: 33
PIF_VALUE: 33

## 2021-08-20 ASSESSMENT — PAIN SCALES - GENERAL
PAINLEVEL_OUTOF10: 2
PAINLEVEL_OUTOF10: 0

## 2021-08-20 NOTE — PROGRESS NOTES
This note also relates to the following rows which could not be included:  Vt Ordered - Cannot attach notes to unvalidated device data  Rate Set - Cannot attach notes to unvalidated device data  Pressure Support - Cannot attach notes to unvalidated device data  FiO2  - Cannot attach notes to unvalidated device data  Sensitivity - Cannot attach notes to unvalidated device data  PEEP/CPAP - Cannot attach notes to unvalidated device data  Peak Inspiratory Pressure - Cannot attach notes to unvalidated device data  Mean Airway Pressure - Cannot attach notes to unvalidated device data  Rate Measured - Cannot attach notes to unvalidated device data  Vt Exhaled - Cannot attach notes to unvalidated device data  Minute Volume - Cannot attach notes to unvalidated device data  I:E Ratio - Cannot attach notes to unvalidated device data  Pulse - Cannot attach notes to unvalidated device data  Resp - Cannot attach notes to unvalidated device data  High Pressure Alarm - Cannot attach notes to unvalidated device data  Low Minute Volume Alarm - Cannot attach notes to unvalidated device data  High Respiratory Rate - Cannot attach notes to unvalidated device data       08/20/21 1533   Vent Information   Skin Assessment Clean, dry, & intact   Equipment Changed HME   Vent Type 980   Vent Mode AC/VC+   SpO2 91 %   I Time/ I Time % 0.95 s   Humidification Source HME   Cough/Sputum   Sputum How Obtained Endotracheal   Cough Productive   Sputum Amount Small   Breath Sounds   Right Upper Lobe Fine Crackles;Diminished   Right Middle Lobe Fine Crackles;Diminished   Right Lower Lobe Fine Crackles;Diminished   Left Upper Lobe Fine Crackles;Diminished   Left Lower Lobe Fine Crackles;Diminished   Additional Respiratory  Assessments   Cuff Pressure (cm H2O) 26 cm H2O   Alarm Settings   Low Exhaled Vt  200 mL   ETT (adult)   Placement Date/Time: 08/17/21 1216   Tube Size: 7.5 mm  Laryngoscope: GlideScope  Blade Size: 4  Location: Oral  Placement Verified By[de-identified] Colorimetric EtCO2 device  Secured at: 23 cm  Placed By: Licensed provider   Secured at 24 cm   Measured From Lips   ET Placement Right   Secured By Commercial tube stuart   Cuff Pressure 26 cm H2O   ambu bag with peep valve at bedside

## 2021-08-20 NOTE — PROGRESS NOTES
08/20/21 0011   Vent Information   Equipment Changed Airway securing device   Vent Type 980   Vent Mode AC/VC+   Vt Ordered 400 mL   Rate Set 20 bmp   Pressure Support 0 cmH20   FiO2  90 %   SpO2 97 %   SpO2/FiO2 ratio 107.78   Sensitivity 3   PEEP/CPAP 15   I Time/ I Time % 1 s   Humidification Source HME   Vent Patient Data   Peak Inspiratory Pressure 32 cmH2O   Mean Airway Pressure 21 cmH20   Rate Measured 20 br/min   Vt Exhaled 406 mL   Minute Volume 8.13 Liters   I:E Ratio 1:2.00   Cough/Sputum   Sputum How Obtained Endotracheal   Cough Non-productive   Spontaneous Breathing Trial (SBT) RT Doc   Pulse 74   Breath Sounds   Right Upper Lobe Diminished   Right Middle Lobe Diminished   Right Lower Lobe Diminished   Left Upper Lobe Diminished   Left Lower Lobe Diminished   Additional Respiratory  Assessments   Resp 21   Alarm Settings   High Pressure Alarm 45 cmH2O   Low Minute Volume Alarm 2 L/min   High Respiratory Rate 40 br/min   Low Exhaled Vt  200 mL   ETT (adult)   Placement Date/Time: 08/17/21 1216   Tube Size: 7.5 mm  Laryngoscope: GlideScope  Blade Size: 4  Location: Oral  Placement Verified By[de-identified] Colorimetric EtCO2 device  Secured at: 23 cm  Placed By: Licensed provider   Secured at 23 cm   Measured From [address] [place] [address],[address] By Commercial tube stuart   Site Condition Dry   Cuff Pressure 34 cm H2O

## 2021-08-20 NOTE — PROGRESS NOTES
Patient SpO2 intermittently drops down into the 70's. This RN has suctioned patent endotracheally and orally with no trace of sputum. FiO2 turned up to 100's to maintain saturations above 90%. PM hospitatlist notified via perfect serve.  Awaiting furthur instructions

## 2021-08-20 NOTE — PROGRESS NOTES
Pulmonary & Critical Care Medicine ICU Progress Note      Reason for visit: Acute respiratory failure, severe COVID-19 pneumonia. Past history of asthma, anxiety, iron deficiency anemia. Events of Last 24 hours: Failed noninvasive ventilation, intubated 8/17/2021. Proned after intubation. On propofol 50, fentanyl 200, Versed 8. Desaturation last night, was proned again, but it turns out she had a cuff leak. The endotracheal tube was not required to be changed. Rate 20, , FiO2 100% and PEEP 12. Has an NG with tube feeds at 30 mL/h, which is goal. Has had low-grade fever. Invasive Lines:   CVC RIJ 8/17  Art Line 8/17        MV: 8/17/2021    Recent Labs     08/18/21  1458 08/19/21  0614   PHART 7.237* 7.310*   [id number] 64.7* 50.4*   PO2ART 63.3* 82.6       MV Settings:  Vent Mode: AC/VC+ Rate Set: 20 bmp/Vt Ordered: 400 mL/ [email or web address])    IV:   midazolam 10 mg/hr (08/20/21 0712)    dextrose      propofol 50 mcg/kg/min (08/20/21 0518)    fentaNYL (SUBLIMAZE) infusion 200 mcg/hr (08/20/21 0400)    cisatracurium (NIMBEX) infusion Stopped (08/18/21 1207)    norepinephrine 2 mcg/min (08/20/21 0018)    sodium chloride         Vitals:  BP (!) 119/52   Pulse 71   Temp 99.7 °F (37.6 °C) (Bladder)   Resp 20   Ht 5' (1.524 m)   Wt 185 lb (83.9 kg)   LMP 04/15/2012   SpO2 94%   BMI 36.13 kg/m²          Intake/Output Summary (Last 24 hours) at 8/20/2021 0804  Last data filed at 8/20/2021 0600  Gross per 24 hour   Intake 2683 ml   Output 850 ml   Net 1833 ml       EXAM:  Due to the current efforts to prevent transmission of COVID-19 and also the need to preserve PPE for other caregivers, a face-to-face encounter with the patient was not performed. That being said, all relevant records and diagnostic tests were reviewed, including laboratory results and imaging. Please reference any relevant documentation elsewhere. Care will be coordinated with the primary service.       Medications:  Scheduled Meds:   sennosides-docusate sodium  2 tablet Oral Daily    insulin lispro  0-6 Units Subcutaneous Q4H    mupirocin   Nasal BID    pantoprazole  40 mg Intravenous Daily    carboxymethylcellulose PF  1 drop Both Eyes Q4H    chlorhexidine  15 mL Mouth/Throat BID    montelukast  10 mg Oral Nightly    PARoxetine  40 mg Oral Daily    sodium chloride flush  5-40 mL Intravenous 2 times per day    methylPREDNISolone  40 mg Intravenous Q12H    Vitamin D  2,000 Units Oral Daily    enoxaparin  30 mg Subcutaneous BID       PRN Meds:  midazolam, glucose, dextrose, glucagon (rDNA), dextrose, potassium chloride, guaiFENesin-dextromethorphan, benzocaine-menthol, zolpidem, sodium chloride flush, sodium chloride, ondansetron **OR** ondansetron, polyethylene glycol, acetaminophen **OR** acetaminophen    Results:  CBC:   Recent Labs     08/17/21  1519 08/18/21  0538 08/19/21  0615   WBC 3.5* 5.0 11.3*   HGB 12.2 13.2 13.0   HCT 34.8* 37.2 35.7*   MCV 90.7 91.1 91.6    278 396     BMP:   Recent Labs     08/18/21  0538 08/19/21  0615 08/20/21  0520    135* 136   K 4.4 4.1 4.5    103 102   CO2 22 21 24   PHOS  --  3.9 3.3   BUN 31* 38* 31*   CREATININE 0.8 0.9 0.8     Cultures:      Films:  CXR reviewed by me       Assessment and plan:  Acute respiratory failure, hypoxemic. Intubated 8/7, stable, FiO2 60%,  PEEP 15. Will attempt to drop  PEEP  Acute COVID-19 pneumonia. Presented with GI symptoms and confusion, those had resolved but her respiratory status has worsened. ID following, received Actemra. On appropriate dose of steroid. Acute encephalopathy. Had improved, currently sedated  Abnormal LFT. Did have steatosis on CT, could be related to COVID-19 infection as well. Slightly better on repeat LFT. Hyperglycemia. May need to be monitored and placed on SSI, better controlled  Leukopenia. Presently has leukocytosis, concern for superinfection  Hypokalemia. Replace and monitor  CINDY.   On CPAP at home. Details not available. Address when she improves  Bronchial asthma. Mild intermittent as per the patient. Presently no wheezing. On Singulair, added as needed bronchodilator  Obesity. Address when she improves. DVT prophylaxis. On escalated dose of Lovenox at 30 mg SQ every 12 hours      Critical care time spent reviewing labs/films, examining patient, collaborating with other physicians but excluding procedures for life threatening organ failure is 31 minutes. Multidisciplinary rounds were completed at the bedside with participation from the intensivist, pharmacy, nursing, nutrition.   All labs and imaging studies reviewed, discussed        Electronically signed by:  Justen Dc MD    8/20/2021    8:04 AM.

## 2021-08-20 NOTE — PLAN OF CARE
Care plan reviewed  Problem: Airway Clearance - Ineffective  Goal: Achieve or maintain patent airway  Outcome: Ongoing     Problem: Gas Exchange - Impaired  Goal: Absence of hypoxia  Outcome: Ongoing     Problem: Body Temperature -  Risk of, Imbalanced  Goal: Ability to maintain a body temperature within defined limits  Outcome: Ongoing  Goal: Complications related to the disease process, condition or treatment will be avoided or minimized  Outcome: Ongoing     Problem:  Body Temperature -  Risk of, Imbalanced  Goal: Complications related to the disease process, condition or treatment will be avoided or minimized  Outcome: Ongoing     Problem: Isolation Precautions - Risk of Spread of Infection  Goal: Prevent transmission of infection  Outcome: Ongoing     Problem: Nutrition Deficits  Goal: Optimize nutritional status  Outcome: Ongoing     Problem: Risk for Fluid Volume Deficit  Goal: Maintain normal heart rhythm  Outcome: Ongoing     Problem: Falls - Risk of:  Goal: Will remain free from falls  Description: Will remain free from falls  Outcome: Ongoing     Problem: Skin Integrity:  Goal: Absence of new skin breakdown  Description: Absence of new skin breakdown  Outcome: Ongoing

## 2021-08-20 NOTE — PROGRESS NOTES
Hospitalist Progress Note      PCP: No primary care provider on file.     Date of Admission: 8/12/2021    Chief Complaint:   covid/dehydration     Hospital Course: reviewed      Subjective: remains intubated, sedated, had issues with trach cuff leak(now resolved)         Medications:  Reviewed    Infusion Medications    midazolam 10 mg/hr (08/20/21 0712)    dextrose      propofol 50 mcg/kg/min (08/20/21 0518)    fentaNYL (SUBLIMAZE) infusion 200 mcg/hr (08/20/21 0400)    cisatracurium (NIMBEX) infusion Stopped (08/18/21 1207)    norepinephrine 2 mcg/min (08/20/21 0018)    sodium chloride       Scheduled Medications    sennosides-docusate sodium  2 tablet Oral Daily    insulin lispro  0-6 Units Subcutaneous Q4H    mupirocin   Nasal BID    pantoprazole  40 mg Intravenous Daily    carboxymethylcellulose PF  1 drop Both Eyes Q4H    chlorhexidine  15 mL Mouth/Throat BID    montelukast  10 mg Oral Nightly    PARoxetine  40 mg Oral Daily    sodium chloride flush  5-40 mL Intravenous 2 times per day    methylPREDNISolone  40 mg Intravenous Q12H    Vitamin D  2,000 Units Oral Daily    enoxaparin  30 mg Subcutaneous BID     PRN Meds: midazolam, glucose, dextrose, glucagon (rDNA), dextrose, potassium chloride, guaiFENesin-dextromethorphan, benzocaine-menthol, zolpidem, sodium chloride flush, sodium chloride, ondansetron **OR** ondansetron, polyethylene glycol, acetaminophen **OR** acetaminophen      Intake/Output Summary (Last 24 hours) at 8/20/2021 0848  Last data filed at 8/20/2021 0600  Gross per 24 hour   Intake 2683 ml   Output 850 ml   Net 1833 ml       Physical Exam Performed:    BP (!) 119/52   Pulse 71   Temp 99.7 °F (37.6 °C) (Bladder)   Resp 20   Ht 5' (1.524 m)   Wt 185 lb (83.9 kg)   LMP 04/15/2012   SpO2 94%   BMI 36.13 kg/m²     General appearance:  no distress today, appears stated age and cooperative. intubated, no longer proned  HEENT:  Normal cephalic, atraumatic without obvious deformity. Pupils equal, round, and reactive to light.  Extra ocular muscles intact. Conjunctivae/corneas clear. Neck: Supple, with full range of motion. No jugular venous distention. Trachea midline. Respiratory:  inc'd respiratory effort. Clear to auscultation, bilaterally without Wheezes/Rhonchi. Mild bibas rales  Cardiovascular:  Regular rate and rhythm with normal S1/S2 without murmurs, rubs or gallops. Abdomen: Soft, non-tender, non-distended with normal bowel sounds. Musculoskeletal:  No clubbing, cyanosis or edema bilaterally.  Full range of motion without deformity. Skin: Skin color, texture, turgor normal.  No rashes or lesions. Neurologic:  Neurovascularly intact without any focal sensory/motor deficits. Cranial nerves: II-XII intact, grossly non-focal.  Psychiatric:  Alert and oriented x0, sedated  Capillary Refill: Brisk,3 seconds, normal  Peripheral Pulses: +2 palpable, equal bilaterally       Labs:   Recent Labs     08/17/21  1519 08/18/21  0538 08/19/21  0615   WBC 3.5* 5.0 11.3*   HGB 12.2 13.2 13.0   HCT 34.8* 37.2 35.7*    278 396     Recent Labs     08/18/21  0538 08/19/21  0615 08/20/21  0520    135* 136   K 4.4 4.1 4.5    103 102   CO2 22 21 24   BUN 31* 38* 31*   CREATININE 0.8 0.9 0.8   CALCIUM 8.2* 8.2* 7.6*   PHOS  --  3.9 3.3     No results for input(s): AST, ALT, BILIDIR, BILITOT, ALKPHOS in the last 72 hours. No results for input(s): INR in the last 72 hours. No results for input(s): Russ Oconnor in the last 72 hours.     Urinalysis:      Lab Results   Component Value Date    NITRU Negative 08/12/2021    WBCUA 10-20 08/12/2021    BACTERIA 1+ 08/12/2021    RBCUA 3-4 08/12/2021    BLOODU TRACE-INTACT 08/12/2021    SPECGRAV >=1.030 08/12/2021    GLUCOSEU Negative 08/12/2021       Radiology:  XR ABDOMEN FOR NG/OG/NE TUBE PLACEMENT   Final Result   Status post placement of nasogastric tube with distal tip located in the   region the stomach as described above.         XR CHEST PORTABLE   Final Result   1. Status post placement of endotracheal tube with distal tip located   approximately 3.5 cm above the elsi. 2. Status post placement of right internal jugular central line with distal   tip located near junction of superior vena cava and right atrium. No   evidence of pneumothorax. XR CHEST PORTABLE   Final Result   Diffuse bilateral airspace disease. Aeration is improved compared to prior   study. XR CHEST PORTABLE   Final Result   Significant worsening in appearance of bilateral pneumonia         CT Head WO Contrast   Final Result   No acute intracranial abnormality. CT ABDOMEN PELVIS W IV CONTRAST Additional Contrast? None   Final Result   1. Mucosal thickening and enhancement of the terminal ileum, proximal colon   and sigmoid colon/rectum. Pattern is nonspecific and suggests underlying   enterocolitis. Infectious or inflammatory etiologies may be considered. 2. No pattern of bowel obstruction. 3. Hepatic steatosis. 4. Airspace changes in the lower chest likely represent underlying viral   pneumonia. XR CHEST PORTABLE   Final Result   Perihilar opacities could represent COVID pneumonia given indication. Correlate with presentation to exclude superimposed congestive heart failure.                  Assessment/Plan:    Active Hospital Problems    Diagnosis     Acute respiratory failure with hypoxia (HCC) [J96.01]     Altered mental status [R41.82]     Mild intermittent asthma without complication [X04.33]     CINDY on CPAP [G47.33, Z99.89]     Elevated transaminase level [R74.01]     Hepatic steatosis [K76.0]     Obesity (BMI 35.0-39.9 without comorbidity) [E66.9]     2019 novel coronavirusinfected pneumonia (NCIP) [U07.1, J12.82]            Sepsis - due to covid, with tachypnea/tachycardia and +rapid test, pt was recently diagnosed on 8/11  -supportive care  -ivfs given  -lactate was wnl  -no abx given viral etiology     Acute encephalopathy- seemed to have improved with ivfs, due to ?covid, CThead no acute abn  -tele  -neurochecks     Acute resp failure- with sats 88% ra in ER per staff, likely from covid, pt is unvaccinated  -iv solumedrol q12h ordered  -supportive care  -tele  -ID consulted, apprec recs, Per ID note( Date of symptom onset 8/7/21, Date of diagnosis and admission 8/13/21 (date of outpt +test unclear))  - started on Actemra on 8/16   -pulm consulted given worsening   -intubated 8/17, proned as well     N/v/abd pain/diarrhea- likely from covid, CTa/p done(suggestive of enterocolitis, no obstruction, noted hepatiac steatosis)  -continue supportive care  - cdiff negative  -ua was abn and ucx > 50k mixed mike, no UTI at this time     Asthma- on home singulair     Anxiety-continued paxil        DVT Prophylaxis: lovenox bid  Diet: Diet NPO  ADULT TUBE FEEDING; Nasogastric; Peptide Based High Protein; Continuous; 10; Yes; 10; Q 6 hours; 30; 60; Q 4 hours; Protein; Administer 2 proteinex modulars daily through NGT.  Flush with 30 Ml free water  Code Status: Full Code  PT/OT Eval Status: not possible     Dispo -unclear, icu care, discussed high TGs with pulm/cc(plan to wean off)    Barbara Zapata MD

## 2021-08-20 NOTE — PROGRESS NOTES
08/20/21 0739   Vent Information   Skin Assessment Clean, dry, & intact   Vt Ordered 400 mL   Rate Set 20 bmp   Pressure Support 0 cmH20   FiO2  65 %   SpO2 94 %   SpO2/FiO2 ratio 144.62   Sensitivity 3   PEEP/CPAP 15   I Time/ I Time % 1 s   Humidification Source HME   Vent Patient Data   Peak Inspiratory Pressure 33 cmH2O   Mean Airway Pressure 21 cmH20   Rate Measured 20 br/min   Vt Exhaled 405 mL   Minute Volume 8.11 Liters   I:E Ratio 1:2.00   Spontaneous Breathing Trial (SBT) RT Doc   Pulse 71   Breath Sounds   Right Upper Lobe Diminished   Right Middle Lobe Diminished   Right Lower Lobe Diminished   Left Upper Lobe Diminished   Left Lower Lobe Diminished   Additional Respiratory  Assessments   Resp 20   Position Semi-Beebe's   Cuff Pressure (cm H2O) 30 cm H2O   Alarm Settings   High Pressure Alarm 45 cmH2O   Low Minute Volume Alarm 2 L/min   High Respiratory Rate 40 br/min   Patient Observation   Observations ambu @ bedside   ETT (adult)   Placement Date/Time: 08/17/21 1216   Tube Size: 7.5 mm  Laryngoscope: GlideScope  Blade Size: 4  Location: Oral  Placement Verified By[de-identified] Colorimetric EtCO2 device  Secured at: 23 cm  Placed By: Licensed provider   Secured at 23 cm   Measured From [address] [place] [address],[address] By Commercial tube stuart   Site Condition Dry

## 2021-08-20 NOTE — PROGRESS NOTES
Irene Walsh admitted 2021  7:13 PM FOR nausea/vomiting and recent dx  +COVID. Increased oxygen needs overnight, RR called for respiratory status, placed on AVAPS and transferred to PCU. Ultimately transferred to ICU requiring intubation  with declining respiratory status. Patient seen and examined today during multidisciplinary ICU rounds  Intubated, sedated. Events overnight noted with airleak in cuff, resolved and maintaining sats this am    Past Medical History:   Diagnosis Date    Anxiety     Asthma     COVID-19 2021    Haemorrhoids in the puerperium, delivered, with mention of postpartum complication     Iron deficiency     Menorrhagia       Past Surgical History:   Procedure Laterality Date    BREAST REDUCTION SURGERY      LAPAROSCOPY      TONSILLECTOMY      ULNA OSTEOTOMY          BP (!) 122/54   Pulse 70   Temp 99.6 °F (37.6 °C) (Core)   Resp 20   Ht 5' (1.524 m)   Wt 185 lb (83.9 kg)   LMP 04/15/2012   SpO2 97%   BMI 36.13 kg/m²     CVC Triple Lumen 21 Right Internal jugular (Active)   Placement Date/Time: 21 1230   Pre-existing: No  Timeout: Patient;Procedure;Site/Side;Appropriate Equipment; Consent Confirmed;Sterile Technique using full body drape;Site prepped with chlorhexidine;Sterile drsg with biopatch; Availability of Impl. .. ETT (adult) (Active)   Placement Date/Time: 21 1216   Tube Size: 7.5 mm  Laryngoscope: GlideScope  Blade Size: 4  Location: Oral  Placement Verified By[de-identified] Colorimetric EtCO2 device  Secured at: 23 cm  Placed By: Licensed provider       Arterial Line 21 Right Brachial (Active)   Placement Date/Time: 21 1230   Allens Test: Yes  Timeout: Patient;Procedure;Site/Side;Appropriate Equipment; Consent Confirmed;Sterile Technique using full body drape;Site prepped with chlorhexidine;Sterile drsg with biopatch; Availability of Impl. .. Plan:  MV D4 FiO2 65/15, supine since .  AB.31/56/78/28  Sedated with propofol/fentanyl/versed, paralytic off. Attempt to decrease propofol with triglyceride levels, scheduled roxicodone to help with sedation burden  Decrease PEEP to 13 today, if tolerates will wean FiO2 slowly  Transient hypotension but not currently requiring pressors  Continue IV steroids for severe COVID, received toci 8/16. Tolerating enteral feeds, glycemic control acceptable, on bowel regimen  Droplet isolation as ordered. Discussed with Dr. Kirill Winn today    DVT ppx: lovenox  GI ppx: protonix    Traci N. Merline Cinnamon, APRN-CNP

## 2021-08-20 NOTE — PROGRESS NOTES
08/20/21 0404   Vent Information   Equipment Changed HME   Vent Type 980   Vent Mode AC/VC+   Vt Ordered 400 mL   Rate Set 20 bmp   Pressure Support 0 cmH20   FiO2  70 %   SpO2 96 %   SpO2/FiO2 ratio 137.14   Sensitivity 3   PEEP/CPAP 15   I Time/ I Time % 1 s   Humidification Source HME   Vent Patient Data   Peak Inspiratory Pressure 32 cmH2O   Mean Airway Pressure 21 cmH20   Rate Measured 20 br/min   Vt Exhaled 409 mL   Minute Volume 8.14 Liters   I:E Ratio 1:2.00   Cough/Sputum   Sputum How Obtained Endotracheal   Cough Productive   Sputum Amount Small   Sputum Color Red;Creamy; Yellow   Tenacity Thick   Spontaneous Breathing Trial (SBT) RT Doc   Pulse 71   Breath Sounds   Right Upper Lobe Diminished   Right Middle Lobe Diminished   Right Lower Lobe Diminished   Left Upper Lobe Diminished   Left Lower Lobe Diminished   Additional Respiratory  Assessments   Resp 18   Alarm Settings   High Pressure Alarm 45 cmH2O   Low Minute Volume Alarm 2 L/min   High Respiratory Rate 40 br/min   Low Exhaled Vt  200 mL   ETT (adult)   Placement Date/Time: 08/17/21 1216   Tube Size: 7.5 mm  Laryngoscope: GlideScope  Blade Size: 4  Location: Oral  Placement Verified By[de-identified] Colorimetric EtCO2 device  Secured at: 23 cm  Placed By: Licensed provider   Secured at 23 cm   Measured From Lips   ET Placement Right   Secured By Commercial tube stuart   Site Condition Dry   Cuff Pressure 32 cm H2O

## 2021-08-20 NOTE — PROGRESS NOTES
This note also relates to the following rows which could not be included:  Vt Ordered - Cannot attach notes to unvalidated device data  Rate Set - Cannot attach notes to unvalidated device data  Pressure Support - Cannot attach notes to unvalidated device data  FiO2  - Cannot attach notes to unvalidated device data  Sensitivity - Cannot attach notes to unvalidated device data  PEEP/CPAP - Cannot attach notes to unvalidated device data  I Time/ I Time % - Cannot attach notes to unvalidated device data  Peak Inspiratory Pressure - Cannot attach notes to unvalidated device data  Mean Airway Pressure - Cannot attach notes to unvalidated device data  Rate Measured - Cannot attach notes to unvalidated device data  Vt Exhaled - Cannot attach notes to unvalidated device data  Minute Volume - Cannot attach notes to unvalidated device data  I:E Ratio - Cannot attach notes to unvalidated device data  Pulse - Cannot attach notes to unvalidated device data  Resp - Cannot attach notes to unvalidated device data  High Pressure Alarm - Cannot attach notes to unvalidated device data  Low Minute Volume Alarm - Cannot attach notes to unvalidated device data  High Respiratory Rate - Cannot attach notes to unvalidated device data       08/20/21 1950   Vent Information   Vent Type 980   Vent Mode AC/VC+   SpO2 94 %   Humidification Source HME   Cough/Sputum   Sputum How Obtained Endotracheal;Oral   Cough Productive;Strong   Alarm Settings   Low Exhaled Vt  200 mL   ETT (adult)   Placement Date/Time: 08/17/21 1216   Tube Size: 7.5 mm  Laryngoscope: GlideScope  Blade Size: 4  Location: Oral  Placement Verified By[de-identified] Colorimetric EtCO2 device  Secured at: 23 cm  Placed By: Licensed provider   ET Placement Left   Patient desats with coughing but recovers

## 2021-08-20 NOTE — PROGRESS NOTES
Patient's SpO2 is down-trending into the mid-low 80's and FiO2 requirement has increased to 100%. Will manually prone patient again. ADDENDUM:  Proning cancelled. Patient found to have an ETT cuff leak discovered while repositioning. Air added to cuff and SpO2 improved enough to avoid proning.

## 2021-08-20 NOTE — PROGRESS NOTES
08/20/21 0930   Vent Information   Vt Ordered 400 mL   Rate Set 20 bmp   Pressure Support 0 cmH20   FiO2  60 %   SpO2 97 %   SpO2/FiO2 ratio 161.67   Sensitivity 3   PEEP/CPAP 13   I Time/ I Time % 1 s   Vent Patient Data   Peak Inspiratory Pressure 30 cmH2O   Mean Airway Pressure 20 cmH20   Rate Measured 20 br/min   Vt Exhaled 419 mL   Minute Volume 8.46 Liters   I:E Ratio 1:2.00   Spontaneous Breathing Trial (SBT) RT Doc   Pulse 70   Additional Respiratory  Assessments   Resp 20   Alarm Settings   High Pressure Alarm 45 cmH2O   Low Minute Volume Alarm 2 L/min   High Respiratory Rate 40 br/min

## 2021-08-20 NOTE — PROGRESS NOTES
08/19/21 2000   Vent Information   Vent Type 980   Vent Mode AC/VC+   Vt Ordered 400 mL   Rate Set 20 bmp   Pressure Support 0 cmH20   FiO2  80 %   SpO2 98 %   SpO2/FiO2 ratio 122.5   Sensitivity 3   PEEP/CPAP 15   I Time/ I Time % 1 s   Humidification Source HME   Vent Patient Data   Peak Inspiratory Pressure 34 cmH2O   Mean Airway Pressure 22 cmH20   Rate Measured 20 br/min   Vt Exhaled 403 mL   Minute Volume 8.13 Liters   I:E Ratio 1:2.00   Cough/Sputum   Sputum How Obtained Endotracheal   Cough Productive   Sputum Amount Small   Sputum Color Clear   Tenacity Thin   Spontaneous Breathing Trial (SBT) RT Doc   Pulse 71   Breath Sounds   Right Upper Lobe Diminished   Right Middle Lobe Diminished   Right Lower Lobe Diminished   Left Upper Lobe Diminished   Left Lower Lobe Diminished   Additional Respiratory  Assessments   Resp 20   Alarm Settings   High Pressure Alarm 45 cmH2O   Low Minute Volume Alarm 2 L/min   High Respiratory Rate 40 br/min   Low Exhaled Vt  200 mL   ETT (adult)   Placement Date/Time: 08/17/21 1216   Tube Size: 7.5 mm  Laryngoscope: GlideScope  Blade Size: 4  Location: Oral  Placement Verified By[de-identified] Colorimetric EtCO2 device  Secured at: 23 cm  Placed By: Licensed provider   Secured at 23 cm   Measured From Lips   ET Placement Right   Secured By Commercial tube stuart   Site Condition Dry   Cuff Pressure 28 cm H2O

## 2021-08-20 NOTE — PROGRESS NOTES
Patient was being prepped to prone per Dr. Ashley Berger order for the increased oxygen demand, she was repositioned and found to have a cuff leak. RN and RT at bedside. Air was replaced in ETT cuff and her SpO2 improved into the 90's and we were able to titrate her FiO2 down to 90%. Notified Dr. Ashley Berger, proning order d/c. Will continue to monitor.

## 2021-08-20 NOTE — PROGRESS NOTES
08/20/21 1157   Vent Information   Vt Ordered 400 mL   Rate Set 20 bmp   Pressure Support 0 cmH20   FiO2  60 %   SpO2 94 %   SpO2/FiO2 ratio 156.67   Sensitivity 3   PEEP/CPAP 13   I Time/ I Time % 1 s   Humidification Source HME   Vent Patient Data   Peak Inspiratory Pressure 31 cmH2O   Mean Airway Pressure 19 cmH20   Rate Measured 20 br/min   Vt Exhaled 407 mL   Minute Volume 8.14 Liters   I:E Ratio 1:2.00   Spontaneous Breathing Trial (SBT) RT Doc   Pulse 76   Additional Respiratory  Assessments   Resp 20   Position Semi-Beebe's   Cuff Pressure (cm H2O) 30 cm H2O   Alarm Settings   High Pressure Alarm 45 cmH2O   Low Minute Volume Alarm 2 L/min   High Respiratory Rate 40 br/min   Patient Observation   Observations ambu @ bedside   ETT (adult)   Placement Date/Time: 08/17/21 1216   Tube Size: 7.5 mm  Laryngoscope: GlideScope  Blade Size: 4  Location: Oral  Placement Verified By[de-identified] Colorimetric EtCO2 device  Secured at: 23 cm  Placed By: Licensed provider   Secured at 23 cm   Measured From Lips   ET Placement Right   Secured By Commercial tube stuart   Site Condition Dry

## 2021-08-21 LAB
ALBUMIN SERPL-MCNC: 2.8 G/DL (ref 3.4–5)
ANION GAP SERPL CALCULATED.3IONS-SCNC: 11 MMOL/L (ref 3–16)
BASOPHILS ABSOLUTE: 0 K/UL (ref 0–0.2)
BASOPHILS RELATIVE PERCENT: 0.2 %
BUN BLDV-MCNC: 36 MG/DL (ref 7–20)
CALCIUM SERPL-MCNC: 7.9 MG/DL (ref 8.3–10.6)
CHLORIDE BLD-SCNC: 102 MMOL/L (ref 99–110)
CO2: 23 MMOL/L (ref 21–32)
CREAT SERPL-MCNC: 0.9 MG/DL (ref 0.6–1.2)
EOSINOPHILS ABSOLUTE: 0 K/UL (ref 0–0.6)
EOSINOPHILS RELATIVE PERCENT: 0.4 %
GFR AFRICAN AMERICAN: >60
GFR NON-AFRICAN AMERICAN: >60
GLUCOSE BLD-MCNC: 144 MG/DL (ref 70–99)
GLUCOSE BLD-MCNC: 146 MG/DL (ref 70–99)
GLUCOSE BLD-MCNC: 178 MG/DL (ref 70–99)
GLUCOSE BLD-MCNC: 191 MG/DL (ref 70–99)
GLUCOSE BLD-MCNC: 201 MG/DL (ref 70–99)
GLUCOSE BLD-MCNC: 212 MG/DL (ref 70–99)
GLUCOSE BLD-MCNC: 213 MG/DL (ref 70–99)
HCT VFR BLD CALC: 30.7 % (ref 36–48)
HEMOGLOBIN: 10.5 G/DL (ref 12–16)
LYMPHOCYTES ABSOLUTE: 0.5 K/UL (ref 1–5.1)
LYMPHOCYTES RELATIVE PERCENT: 7.2 %
MCH RBC QN AUTO: 31 PG (ref 26–34)
MCHC RBC AUTO-ENTMCNC: 34.2 G/DL (ref 31–36)
MCV RBC AUTO: 90.7 FL (ref 80–100)
MONOCYTES ABSOLUTE: 0.2 K/UL (ref 0–1.3)
MONOCYTES RELATIVE PERCENT: 2.5 %
NEUTROPHILS ABSOLUTE: 5.8 K/UL (ref 1.7–7.7)
NEUTROPHILS RELATIVE PERCENT: 89.7 %
PDW BLD-RTO: 13.3 % (ref 12.4–15.4)
PERFORMED ON: ABNORMAL
PHOSPHORUS: 3.1 MG/DL (ref 2.5–4.9)
PLATELET # BLD: 245 K/UL (ref 135–450)
PMV BLD AUTO: 9 FL (ref 5–10.5)
POTASSIUM SERPL-SCNC: 4.8 MMOL/L (ref 3.5–5.1)
RBC # BLD: 3.39 M/UL (ref 4–5.2)
SODIUM BLD-SCNC: 136 MMOL/L (ref 136–145)
WBC # BLD: 6.5 K/UL (ref 4–11)

## 2021-08-21 PROCEDURE — 80069 RENAL FUNCTION PANEL: CPT

## 2021-08-21 PROCEDURE — 94003 VENT MGMT INPAT SUBQ DAY: CPT

## 2021-08-21 PROCEDURE — 6370000000 HC RX 637 (ALT 250 FOR IP): Performed by: NURSE PRACTITIONER

## 2021-08-21 PROCEDURE — 6360000002 HC RX W HCPCS: Performed by: INTERNAL MEDICINE

## 2021-08-21 PROCEDURE — 2580000003 HC RX 258: Performed by: INTERNAL MEDICINE

## 2021-08-21 PROCEDURE — 2000000000 HC ICU R&B

## 2021-08-21 PROCEDURE — 94761 N-INVAS EAR/PLS OXIMETRY MLT: CPT

## 2021-08-21 PROCEDURE — 6370000000 HC RX 637 (ALT 250 FOR IP): Performed by: INTERNAL MEDICINE

## 2021-08-21 PROCEDURE — 31500 INSERT EMERGENCY AIRWAY: CPT

## 2021-08-21 PROCEDURE — 99291 CRITICAL CARE FIRST HOUR: CPT | Performed by: INTERNAL MEDICINE

## 2021-08-21 PROCEDURE — 85025 COMPLETE CBC W/AUTO DIFF WBC: CPT

## 2021-08-21 PROCEDURE — 2700000000 HC OXYGEN THERAPY PER DAY

## 2021-08-21 PROCEDURE — C9113 INJ PANTOPRAZOLE SODIUM, VIA: HCPCS | Performed by: NURSE PRACTITIONER

## 2021-08-21 PROCEDURE — 2500000003 HC RX 250 WO HCPCS: Performed by: INTERNAL MEDICINE

## 2021-08-21 PROCEDURE — 6360000002 HC RX W HCPCS: Performed by: NURSE PRACTITIONER

## 2021-08-21 RX ADMIN — FENTANYL CITRATE 250 MCG/HR: 0.05 INJECTION, SOLUTION INTRAMUSCULAR; INTRAVENOUS at 08:46

## 2021-08-21 RX ADMIN — INSULIN LISPRO 1 UNITS: 100 INJECTION, SOLUTION INTRAVENOUS; SUBCUTANEOUS at 08:30

## 2021-08-21 RX ADMIN — METHYLPREDNISOLONE SODIUM SUCCINATE 40 MG: 40 INJECTION, POWDER, FOR SOLUTION INTRAMUSCULAR; INTRAVENOUS at 08:35

## 2021-08-21 RX ADMIN — OXYCODONE HYDROCHLORIDE 5 MG: 5 SOLUTION ORAL at 06:31

## 2021-08-21 RX ADMIN — ACETAMINOPHEN 650 MG: 325 TABLET ORAL at 11:17

## 2021-08-21 RX ADMIN — INSULIN LISPRO 2 UNITS: 100 INJECTION, SOLUTION INTRAVENOUS; SUBCUTANEOUS at 16:15

## 2021-08-21 RX ADMIN — PROPOFOL 50 MCG/KG/MIN: 10 INJECTION, EMULSION INTRAVENOUS at 12:50

## 2021-08-21 RX ADMIN — INSULIN LISPRO 1 UNITS: 100 INJECTION, SOLUTION INTRAVENOUS; SUBCUTANEOUS at 11:20

## 2021-08-21 RX ADMIN — ACETAMINOPHEN 650 MG: 325 TABLET ORAL at 17:13

## 2021-08-21 RX ADMIN — FENTANYL CITRATE 250 MCG/HR: 0.05 INJECTION, SOLUTION INTRAMUSCULAR; INTRAVENOUS at 12:52

## 2021-08-21 RX ADMIN — Medication 15 ML: at 20:27

## 2021-08-21 RX ADMIN — CARBOXYMETHYLCELLULOSE SODIUM 1 DROP: 10 GEL OPHTHALMIC at 04:10

## 2021-08-21 RX ADMIN — OXYCODONE HYDROCHLORIDE 5 MG: 5 SOLUTION ORAL at 23:00

## 2021-08-21 RX ADMIN — ENOXAPARIN SODIUM 30 MG: 30 INJECTION SUBCUTANEOUS at 20:25

## 2021-08-21 RX ADMIN — MIDAZOLAM 10 MG/HR: 5 INJECTION INTRAMUSCULAR; INTRAVENOUS at 12:59

## 2021-08-21 RX ADMIN — ACETAMINOPHEN 650 MG: 325 TABLET ORAL at 04:10

## 2021-08-21 RX ADMIN — Medication 0.4 MCG/KG/HR: at 08:43

## 2021-08-21 RX ADMIN — SODIUM CHLORIDE, PRESERVATIVE FREE 10 ML: 5 INJECTION INTRAVENOUS at 08:42

## 2021-08-21 RX ADMIN — MUPIROCIN: 20 OINTMENT TOPICAL at 08:36

## 2021-08-21 RX ADMIN — PROPOFOL 50 MCG/KG/MIN: 10 INJECTION, EMULSION INTRAVENOUS at 20:25

## 2021-08-21 RX ADMIN — MUPIROCIN: 20 OINTMENT TOPICAL at 20:26

## 2021-08-21 RX ADMIN — ACETAMINOPHEN 650 MG: 325 TABLET ORAL at 23:00

## 2021-08-21 RX ADMIN — CARBOXYMETHYLCELLULOSE SODIUM 1 DROP: 10 GEL OPHTHALMIC at 08:35

## 2021-08-21 RX ADMIN — OXYCODONE HYDROCHLORIDE 5 MG: 5 SOLUTION ORAL at 11:17

## 2021-08-21 RX ADMIN — INSULIN LISPRO 1 UNITS: 100 INJECTION, SOLUTION INTRAVENOUS; SUBCUTANEOUS at 20:49

## 2021-08-21 RX ADMIN — ENOXAPARIN SODIUM 30 MG: 30 INJECTION SUBCUTANEOUS at 08:35

## 2021-08-21 RX ADMIN — PROPOFOL 50 MCG/KG/MIN: 10 INJECTION, EMULSION INTRAVENOUS at 04:10

## 2021-08-21 RX ADMIN — PROPOFOL 50 MCG/KG/MIN: 10 INJECTION, EMULSION INTRAVENOUS at 08:32

## 2021-08-21 RX ADMIN — OXYCODONE HYDROCHLORIDE 5 MG: 5 SOLUTION ORAL at 17:14

## 2021-08-21 RX ADMIN — PROPOFOL 50 MCG/KG/MIN: 10 INJECTION, EMULSION INTRAVENOUS at 01:00

## 2021-08-21 RX ADMIN — Medication 2000 UNITS: at 08:35

## 2021-08-21 RX ADMIN — PAROXETINE HYDROCHLORIDE 40 MG: 20 TABLET, FILM COATED ORAL at 08:36

## 2021-08-21 RX ADMIN — FENTANYL CITRATE 250 MCG/HR: 0.05 INJECTION, SOLUTION INTRAMUSCULAR; INTRAVENOUS at 00:09

## 2021-08-21 RX ADMIN — Medication 15 ML: at 08:34

## 2021-08-21 RX ADMIN — METHYLPREDNISOLONE SODIUM SUCCINATE 40 MG: 40 INJECTION, POWDER, FOR SOLUTION INTRAMUSCULAR; INTRAVENOUS at 20:26

## 2021-08-21 RX ADMIN — PANTOPRAZOLE SODIUM 40 MG: 40 INJECTION, POWDER, FOR SOLUTION INTRAVENOUS at 08:35

## 2021-08-21 RX ADMIN — FENTANYL CITRATE 250 MCG/HR: 0.05 INJECTION, SOLUTION INTRAMUSCULAR; INTRAVENOUS at 05:24

## 2021-08-21 RX ADMIN — SODIUM CHLORIDE, PRESERVATIVE FREE 10 ML: 5 INJECTION INTRAVENOUS at 20:26

## 2021-08-21 RX ADMIN — FENTANYL CITRATE 250 MCG/HR: 0.05 INJECTION, SOLUTION INTRAMUSCULAR; INTRAVENOUS at 21:15

## 2021-08-21 RX ADMIN — MONTELUKAST SODIUM 10 MG: 10 TABLET ORAL at 20:26

## 2021-08-21 RX ADMIN — INSULIN LISPRO 2 UNITS: 100 INJECTION, SOLUTION INTRAVENOUS; SUBCUTANEOUS at 04:59

## 2021-08-21 RX ADMIN — Medication 0.6 MCG/KG/HR: at 22:23

## 2021-08-21 RX ADMIN — CARBOXYMETHYLCELLULOSE SODIUM 1 DROP: 10 GEL OPHTHALMIC at 11:17

## 2021-08-21 RX ADMIN — CARBOXYMETHYLCELLULOSE SODIUM 1 DROP: 10 GEL OPHTHALMIC at 16:12

## 2021-08-21 RX ADMIN — FENTANYL CITRATE 250 MCG/HR: 0.05 INJECTION, SOLUTION INTRAMUSCULAR; INTRAVENOUS at 17:14

## 2021-08-21 RX ADMIN — DOCUSATE SODIUM 50 MG AND SENNOSIDES 8.6 MG 2 TABLET: 8.6; 5 TABLET, FILM COATED ORAL at 08:35

## 2021-08-21 RX ADMIN — CARBOXYMETHYLCELLULOSE SODIUM 1 DROP: 10 GEL OPHTHALMIC at 20:26

## 2021-08-21 RX ADMIN — MIDAZOLAM 10 MG/HR: 5 INJECTION INTRAMUSCULAR; INTRAVENOUS at 05:21

## 2021-08-21 RX ADMIN — PROPOFOL 50 MCG/KG/MIN: 10 INJECTION, EMULSION INTRAVENOUS at 16:14

## 2021-08-21 ASSESSMENT — PAIN SCALES - GENERAL
PAINLEVEL_OUTOF10: 0
PAINLEVEL_OUTOF10: 2
PAINLEVEL_OUTOF10: 0
PAINLEVEL_OUTOF10: 0

## 2021-08-21 ASSESSMENT — PULMONARY FUNCTION TESTS
PIF_VALUE: 32
PIF_VALUE: 36
PIF_VALUE: 32
PIF_VALUE: 37
PIF_VALUE: 31
PIF_VALUE: 35
PIF_VALUE: 33
PIF_VALUE: 37
PIF_VALUE: 35
PIF_VALUE: 32
PIF_VALUE: 31
PIF_VALUE: 33
PIF_VALUE: 32
PIF_VALUE: 33
PIF_VALUE: 34
PIF_VALUE: 33
PIF_VALUE: 32
PIF_VALUE: 35
PIF_VALUE: 33
PIF_VALUE: 32
PIF_VALUE: 34
PIF_VALUE: 32
PIF_VALUE: 40
PIF_VALUE: 34
PIF_VALUE: 35
PIF_VALUE: 33
PIF_VALUE: 32
PIF_VALUE: 42
PIF_VALUE: 33
PIF_VALUE: 32
PIF_VALUE: 32
PIF_VALUE: 33
PIF_VALUE: 31
PIF_VALUE: 29
PIF_VALUE: 36
PIF_VALUE: 34
PIF_VALUE: 34
PIF_VALUE: 33
PIF_VALUE: 35
PIF_VALUE: 32

## 2021-08-21 NOTE — PROGRESS NOTES
08/21/21 0429   Vent Information   $Ventilation $Initial Day   Equipment Changed HME   Vent Type 980   Vent Mode AC/VC+   Vt Ordered 400 mL   Rate Set 20 bmp   Pressure Support 0 cmH20   FiO2  100 %   SpO2 95 %   SpO2/FiO2 ratio 95   Sensitivity 3   PEEP/CPAP 15   I Time/ I Time % 0.95 s   Vent Patient Data   Peak Inspiratory Pressure 40 cmH2O   Mean Airway Pressure 22 cmH20   Rate Measured 21 br/min   Vt Exhaled 297 mL   Minute Volume 9.08 Liters   I:E Ratio 1.60:1   Spontaneous Breathing Trial (SBT) RT Doc   Pulse 70   Breath Sounds   Right Upper Lobe Diminished   Right Middle Lobe Diminished   Right Lower Lobe Diminished   Left Upper Lobe Diminished   Left Lower Lobe Diminished   Additional Respiratory  Assessments   Resp 21   Position Semi-Beebe's   Alarm Settings   High Pressure Alarm 45 cmH2O   Low Minute Volume Alarm 2 L/min   High Respiratory Rate 40 br/min   Low Exhaled Vt  200 mL   Patient Observation   Observations Ambu @ bedside.  ETT moved to center   ETT (adult)   Placement Date/Time: 08/17/21 1216   Tube Size: 7.5 mm  Laryngoscope: GlideScope  Blade Size: 4  Location: Oral  Placement Verified By[de-identified] Colorimetric EtCO2 device  Secured at: 23 cm  Placed By: Licensed provider   Secured at 23 cm   Measured From [address] [place] [address],[address] By Commercial tube stuart   Site Condition Dry

## 2021-08-21 NOTE — PROGRESS NOTES
08/20/21 2350   Vent Information   Vt Ordered 400 mL   Rate Set 20 bmp   Pressure Support 0 cmH20   FiO2  100 %   SpO2 98 %   SpO2/FiO2 ratio 98   Sensitivity 3   PEEP/CPAP 18   I Time/ I Time % 0.95 s   Vent Patient Data   Peak Inspiratory Pressure 38 cmH2O   Mean Airway Pressure 24 cmH20   Rate Measured 20 br/min   Vt Exhaled 414 mL   Minute Volume 8.71 Liters   I:E Ratio 1:2.20   Cough/Sputum   Sputum How Obtained Endotracheal   Cough Productive   Sputum Amount Large   Sputum Color Dark red;Creamy   Tenacity Thick   Spontaneous Breathing Trial (SBT) RT Doc   Pulse 72   Breath Sounds   Right Upper Lobe Diminished   Right Middle Lobe Diminished   Right Lower Lobe Diminished   Left Upper Lobe Diminished   Left Lower Lobe Diminished   Additional Respiratory  Assessments   Resp 18   Position Semi-Beebe's   Cuff Pressure (cm H2O) 30 cm H2O   Alarm Settings   High Pressure Alarm 45 cmH2O   Low Minute Volume Alarm 2 L/min   High Respiratory Rate 40 br/min   Low Exhaled Vt  200 mL   Patient Observation   Observations Ambu @ bedside.  ETT moved to right side   ETT (adult)   Placement Date/Time: 08/17/21 1216   Tube Size: 7.5 mm  Laryngoscope: GlideScope  Blade Size: 4  Location: Oral  Placement Verified By[de-identified] Colorimetric EtCO2 device  Secured at: 23 cm  Placed By: Licensed provider   Secured at 23 cm   Measured From Lips   ET Placement Right   Secured By Commercial tube stuart   Site Condition Dry   Cuff Pressure 30 cm H2O

## 2021-08-21 NOTE — PROGRESS NOTES
08/21/21 1133   Vent Information   Vent Type 980   Vent Mode AC/VC+   Vt Ordered 400 mL   Rate Set 20 bmp   Pressure Support 0 cmH20   FiO2  85 %   SpO2 92 %   SpO2/FiO2 ratio 108.24   Sensitivity 3   PEEP/CPAP 14   I Time/ I Time % 0 s   Humidification Source HME   Vent Patient Data   Peak Inspiratory Pressure 32 cmH2O   Mean Airway Pressure 22 cmH20   Rate Measured 22 br/min   Vt Exhaled 445 mL   Minute Volume 9.65 Liters   I:E Ratio 1:2.20   Cough/Sputum   Sputum How Obtained Oral;Endotracheal;Tracheal   Cough Productive   Sputum Amount Small   Sputum Color Cloudy   Tenacity Thin   Spontaneous Breathing Trial (SBT) RT Doc   Pulse 72   Breath Sounds   Right Upper Lobe Diminished   Right Middle Lobe Diminished   Right Lower Lobe Diminished   Left Upper Lobe Diminished   Left Lower Lobe Diminished   Additional Respiratory  Assessments   Resp 20   Oral Care Mouth suctioned   Alarm Settings   High Pressure Alarm 45 cmH2O   Low Minute Volume Alarm 2 L/min   High Respiratory Rate 40 br/min   Low Exhaled Vt  200 mL   ETT (adult)   Placement Date/Time: 08/17/21 1216   Tube Size: 7.5 mm  Laryngoscope: GlideScope  Blade Size: 4  Location: Oral  Placement Verified By[de-identified] Colorimetric EtCO2 device  Secured at: 23 cm  Placed By: Licensed provider   Secured at 23 cm   Measured From Lips   ET Placement Right   Secured By Commercial tube stuart   Site Condition Drainage (Comment)   Cuff Pressure 34 cm H2O

## 2021-08-21 NOTE — PLAN OF CARE
Problem: Airway Clearance - Ineffective  Goal: Achieve or maintain patent airway  Outcome: Ongoing     Problem: Gas Exchange - Impaired  Goal: Absence of hypoxia  Outcome: Ongoing     Problem: Gas Exchange - Impaired  Goal: Promote optimal lung function  Outcome: Ongoing     Problem: Breathing Pattern - Ineffective  Goal: Ability to achieve and maintain a regular respiratory rate  Outcome: Ongoing     Problem: Body Temperature -  Risk of, Imbalanced  Goal: Ability to maintain a body temperature within defined limits  Outcome: Ongoing     Problem: Body Temperature -  Risk of, Imbalanced  Goal: Will regain or maintain usual level of consciousness  Outcome: Ongoing     Problem: Body Temperature -  Risk of, Imbalanced  Goal: Complications related to the disease process, condition or treatment will be avoided or minimized  Outcome: Ongoing     Problem:  Body Temperature -  Risk of, Imbalanced  Goal: Complications related to the disease process, condition or treatment will be avoided or minimized  Outcome: Ongoing     Problem: Isolation Precautions - Risk of Spread of Infection  Goal: Prevent transmission of infection  Outcome: Ongoing     Problem: Nutrition Deficits  Goal: Optimize nutritional status  Outcome: Ongoing     Problem: Risk for Fluid Volume Deficit  Goal: Maintain normal heart rhythm  Outcome: Ongoing     Problem: Risk for Fluid Volume Deficit  Goal: Maintain absence of muscle cramping  Outcome: Ongoing     Problem: Risk for Fluid Volume Deficit  Goal: Maintain normal serum potassium, sodium, calcium, phosphorus, and pH  Outcome: Ongoing     Problem: Loneliness or Risk for Loneliness  Goal: Demonstrate positive use of time alone when socialization is not possible  Outcome: Ongoing     Problem: Fatigue  Goal: Verbalize increase energy and improved vitality  Outcome: Ongoing     Problem: Falls - Risk of:  Goal: Will remain free from falls  Description: Will remain free from falls  Outcome: Ongoing     Problem: Falls - Risk of:  Goal: Absence of physical injury  Description: Absence of physical injury  Outcome: Ongoing     Problem: Skin Integrity:  Goal: Will show no infection signs and symptoms  Description: Will show no infection signs and symptoms  Outcome: Ongoing     Problem: Skin Integrity:  Goal: Absence of new skin breakdown  Description: Absence of new skin breakdown  Outcome: Ongoing     Problem: Nutrition  Goal: Optimal nutrition therapy  Outcome: Ongoing

## 2021-08-21 NOTE — PROGRESS NOTES
Patient becomes asynchronous causing frequent fluctuations in her  SpO2 dipping into the low 80's requiring her vent settings to be increased. She is maxed on propofol, fentanyl and versed. Notified Dr. Luna Kenyon, new orders given for precedex gtt, prn rocuronium, and fentanyl max increased to 300mg/hr.  Will continue to monitor

## 2021-08-21 NOTE — PROGRESS NOTES
Hospitalist Progress Note      PCP: No primary care provider on file. Date of Admission: 8/12/2021    Chief Complaint:   covid/dehydration     Hospital Course: reviewed     Subjective:  Remains on vent/sedation(requiring precedex, fentanyl, versed, propofol), remains intubated       Medications:  Reviewed    Infusion Medications    dexmedetomidine HCl in NaCl 0.4 mcg/kg/hr (08/21/21 0843)    midazolam 10 mg/hr (08/21/21 0521)    dextrose      propofol 50 mcg/kg/min (08/21/21 0832)    fentaNYL (SUBLIMAZE) infusion 250 mcg/hr (08/21/21 0846)    norepinephrine 1 mcg/min (08/21/21 0400)    sodium chloride       Scheduled Medications    oxyCODONE  5 mg Oral 4 times per day    sennosides-docusate sodium  2 tablet Oral Daily    insulin lispro  0-6 Units Subcutaneous Q4H    mupirocin   Nasal BID    pantoprazole  40 mg Intravenous Daily    carboxymethylcellulose PF  1 drop Both Eyes Q4H    chlorhexidine  15 mL Mouth/Throat BID    montelukast  10 mg Oral Nightly    PARoxetine  40 mg Oral Daily    sodium chloride flush  5-40 mL Intravenous 2 times per day    methylPREDNISolone  40 mg Intravenous Q12H    Vitamin D  2,000 Units Oral Daily    enoxaparin  30 mg Subcutaneous BID     PRN Meds: rocuronium, midazolam, glucose, dextrose, glucagon (rDNA), dextrose, potassium chloride, guaiFENesin-dextromethorphan, benzocaine-menthol, zolpidem, sodium chloride flush, sodium chloride, ondansetron **OR** ondansetron, polyethylene glycol, acetaminophen **OR** acetaminophen      Intake/Output Summary (Last 24 hours) at 8/21/2021 0951  Last data filed at 8/21/2021 0844  Gross per 24 hour   Intake 2708 ml   Output 1450 ml   Net 1258 ml       Physical Exam Performed:    BP (!) 118/55   Pulse 70   Temp 100.7 °F (38.2 °C) (Bladder)   Resp 21   Ht 5' (1.524 m)   Wt 185 lb 13.6 oz (84.3 kg)   LMP 04/15/2012   SpO2 91%   BMI 36.30 kg/m²     General appearance:   appears stated age and cooperative. located in the   region the stomach as described above. XR CHEST PORTABLE   Final Result   1. Status post placement of endotracheal tube with distal tip located   approximately 3.5 cm above the elsi. 2. Status post placement of right internal jugular central line with distal   tip located near junction of superior vena cava and right atrium. No   evidence of pneumothorax. XR CHEST PORTABLE   Final Result   Diffuse bilateral airspace disease. Aeration is improved compared to prior   study. XR CHEST PORTABLE   Final Result   Significant worsening in appearance of bilateral pneumonia         CT Head WO Contrast   Final Result   No acute intracranial abnormality. CT ABDOMEN PELVIS W IV CONTRAST Additional Contrast? None   Final Result   1. Mucosal thickening and enhancement of the terminal ileum, proximal colon   and sigmoid colon/rectum. Pattern is nonspecific and suggests underlying   enterocolitis. Infectious or inflammatory etiologies may be considered. 2. No pattern of bowel obstruction. 3. Hepatic steatosis. 4. Airspace changes in the lower chest likely represent underlying viral   pneumonia. XR CHEST PORTABLE   Final Result   Perihilar opacities could represent COVID pneumonia given indication. Correlate with presentation to exclude superimposed congestive heart failure.                  Assessment/Plan:    Active Hospital Problems    Diagnosis     Acute respiratory failure with hypoxia (HCC) [J96.01]     Altered mental status [R41.82]     Mild intermittent asthma without complication [P36.74]     CINDY on CPAP [G47.33, Z99.89]     Elevated transaminase level [R74.01]     Hepatic steatosis [K76.0]     Obesity (BMI 35.0-39.9 without comorbidity) [E66.9]     2019 novel coronavirusinfected pneumonia (NCIP) [U07.1, J12.82]              Sepsis - due to covid, with tachypnea/tachycardia and +rapid test, pt was recently diagnosed on 8/11  -supportive care  -ivfs given  -lactate was wnl  -no abx given viral etiology     Acute encephalopathy- seemed to have improved with ivfs, due to ?covid, CThead no acute abn  -tele  -neurochecks were ordered     Acute resp failure- with sats 88% ra in ER per staff, likely from covid, pt is unvaccinated  -iv solumedrol q12h ordered,tx for 10 days  -supportive care  -tele  -ID consulted, apprec recs, Per ID note( Date of symptom onset 8/7/21, Date of diagnosis and admission 8/13/21 (date of outpt +test unclear))  - started on Actemra on 8/16   -pulm consulted given worsening   -intubated 8/17, proned as well     N/v/abd pain/diarrhea- likely from covid, CTa/p done(suggestive of enterocolitis, no obstruction, noted hepatic steatosis)  -continue supportive care  - cdiff negative  -ua was abn and ucx > 50k mixed mike, no UTI at this time     Asthma- on home singulair     Anxiety-continued paxil        DVT Prophylaxis: lovenox bid  Diet: Diet NPO  ADULT TUBE FEEDING; Nasogastric; Peptide Based High Protein; Continuous; 10; Yes; 10; Q 6 hours; 30; 60; Q 4 hours; Protein; Administer 2 proteinex modulars daily through NGT.  Flush with 30 Ml free water  Code Status: Full Code      PT/OT Eval Status: not possible     Dispo -unclear, icu care, defer weaning of propofol to pulm/cc(given very high TGs)    Ronit Rausch MD

## 2021-08-21 NOTE — PROGRESS NOTES
Pulmonary & Critical Care Medicine ICU Progress Note      Reason for visit: Acute respiratory failure, severe COVID-19 pneumonia. Past history of asthma, anxiety, iron deficiency anemia. Events of Last 24 hours: Failed noninvasive ventilation, intubated 8/17/2021. Rate 20, , FiO2 85% and PEEP 12. Has an NG with tube feeds at 65 mL/h, which is goal. Has had fever. Invasive Lines:   CVC RIJ 8/17  Art Line 8/17        MV: 8/17/2021    Recent Labs     08/19/21  0614 08/20/21  0841   PHART 7.310* 7.310*   [id number] 50.4* 56.6*   PO2ART 82.6 78.6       MV Settings:  Vent Mode: AC/VC+ Rate Set: 20 bmp/Vt Ordered: 400 mL/ [email or web address])    IV:   dexmedetomidine HCl in NaCl 0.4 mcg/kg/hr (08/21/21 0843)    midazolam 10 mg/hr (08/21/21 0521)    dextrose      propofol 50 mcg/kg/min (08/21/21 0832)    fentaNYL (SUBLIMAZE) infusion 250 mcg/hr (08/21/21 0846)    norepinephrine 1 mcg/min (08/21/21 0400)    sodium chloride         Vitals:  BP (!) 118/55   Pulse 70   Temp 100.7 °F (38.2 °C) (Bladder)   Resp 21   Ht 5' (1.524 m)   Wt 185 lb 13.6 oz (84.3 kg)   LMP 04/15/2012   SpO2 91%   BMI 36.30 kg/m²          Intake/Output Summary (Last 24 hours) at 8/21/2021 1525  Last data filed at 8/21/2021 0844  Gross per 24 hour   Intake 2708 ml   Output 1450 ml   Net 1258 ml       EXAM:  Due to the current efforts to prevent transmission of COVID-19 and also the need to preserve PPE for other caregivers, a face-to-face encounter with the patient was not performed. That being said, all relevant records and diagnostic tests were reviewed, including laboratory results and imaging. Please reference any relevant documentation elsewhere. Care will be coordinated with the primary service.       Medications:  Scheduled Meds:   oxyCODONE  5 mg Oral 4 times per day    sennosides-docusate sodium  2 tablet Oral Daily    insulin lispro  0-6 Units Subcutaneous Q4H    mupirocin   Nasal BID    pantoprazole  40 mg Intravenous Daily    carboxymethylcellulose PF  1 drop Both Eyes Q4H    chlorhexidine  15 mL Mouth/Throat BID    montelukast  10 mg Oral Nightly    PARoxetine  40 mg Oral Daily    sodium chloride flush  5-40 mL Intravenous 2 times per day    methylPREDNISolone  40 mg Intravenous Q12H    Vitamin D  2,000 Units Oral Daily    enoxaparin  30 mg Subcutaneous BID       PRN Meds:  rocuronium, midazolam, glucose, dextrose, glucagon (rDNA), dextrose, potassium chloride, guaiFENesin-dextromethorphan, benzocaine-menthol, zolpidem, sodium chloride flush, sodium chloride, ondansetron **OR** ondansetron, polyethylene glycol, acetaminophen **OR** acetaminophen    Results:  CBC:   Recent Labs     08/19/21  0615 08/21/21  0520   WBC 11.3* 6.5   HGB 13.0 10.5*   HCT 35.7* 30.7*   MCV 91.6 90.7    245     BMP:   Recent Labs     08/19/21  0615 08/20/21  0520 08/21/21  0520   * 136 136   K 4.1 4.5 4.8    102 102   CO2 21 24 23   PHOS 3.9 3.3 3.1   BUN 38* 31* 36*   CREATININE 0.9 0.8 0.9     Cultures:      Films:  CXR reviewed by me       Assessment and plan:  Acute respiratory failure, hypoxemic. Intubated 8/7, stable, FiO2 85%,  PEEP 15. Acute COVID-19 pneumonia. Presented with GI symptoms and confusion, those had resolved but her respiratory status has worsened. ID following, received Actemra. On appropriate dose of steroid. ?  Bacterial superinfection. If fever continues, may need cultures and empiric antibiotic  Acute encephalopathy. Had improved, currently sedated  Abnormal LFT. Did have steatosis on CT, could be related to COVID-19 infection as well. Slightly better on repeat LFT. Hyperglycemia. May need to be monitored and placed on SSI, better controlled  Leukocytosis. Counts decreased  Hypokalemia. Replace and monitor  CINDY. On CPAP at home. Details not available. Address when she improves  Bronchial asthma. Mild intermittent as per the patient. Presently no wheezing.   On

## 2021-08-21 NOTE — PROGRESS NOTES
08/21/21 0753   Vent Information   Equipment Changed HME   Vent Type 980   Vent Mode AC/VC+   Vt Ordered 400 mL   Rate Set 20 bmp   Pressure Support 0 cmH20   FiO2  85 %   SpO2 91 %   SpO2/FiO2 ratio 107.06   Sensitivity 3   PEEP/CPAP 14   I Time/ I Time % 0 s   Humidification Source HME   Vent Patient Data   Peak Inspiratory Pressure 35 cmH2O   Mean Airway Pressure 21 cmH20   Rate Measured 20 br/min   Vt Exhaled 405 mL   Minute Volume 8.88 Liters   I:E Ratio 1:2.20   Cough/Sputum   Sputum How Obtained Oral;Endotracheal   Cough Productive   Sputum Amount Small   Sputum Color Cloudy   Tenacity Thick; Thin   Spontaneous Breathing Trial (SBT) RT Doc   Pulse 70   Breath Sounds   Right Upper Lobe Diminished   Right Middle Lobe Diminished   Right Lower Lobe Diminished   Left Upper Lobe Diminished   Left Lower Lobe Diminished   Additional Respiratory  Assessments   Resp 21   Oral Care Mouth suctioned   Cuff Pressure (cm H2O) 32 cm H2O   Alarm Settings   High Pressure Alarm 45 cmH2O   Low Minute Volume Alarm 2 L/min   High Respiratory Rate 40 br/min   Low Exhaled Vt  200 mL   Patient Observation   Observations ambu bag at bedside, alarms on and audible, apnea parameters on   ETT (adult)   Placement Date/Time: 08/17/21 1216   Tube Size: 7.5 mm  Laryngoscope: GlideScope  Blade Size: 4  Location: Oral  Placement Verified By[de-identified] Colorimetric EtCO2 device  Secured at: 23 cm  Placed By: Licensed provider   $ Intubation emergent  $ Yes   Secured at 23 cm   Measured From Lips   ET Placement Left   Secured By Commercial tube stuart   Site Condition Dry   Cuff Pressure 32 cm H2O

## 2021-08-21 NOTE — PROGRESS NOTES
08/21/21 1609   Vent Information   Equipment Changed HME   Vent Type 980   Vent Mode AC/VC+   Vt Ordered 400 mL   Rate Set 20 bmp   Pressure Support 0 cmH20   FiO2  85 %   SpO2 94 %   SpO2/FiO2 ratio 110.59   Sensitivity 3   PEEP/CPAP 14   I Time/ I Time % 0.95 s   Humidification Source HME   Vent Patient Data   Peak Inspiratory Pressure 36 cmH2O   Mean Airway Pressure 21 cmH20   Rate Measured 22 br/min   Vt Exhaled 641 mL   Minute Volume 8.57 Liters   I:E Ratio 1:2.20   Cough/Sputum   Sputum How Obtained Oral;Endotracheal;Suctioned   Cough Productive   Sputum Amount Small   Sputum Color Cloudy   Tenacity Thick; Thin   Spontaneous Breathing Trial (SBT) RT Doc   Pulse 76   Breath Sounds   Right Upper Lobe Diminished   Right Middle Lobe Diminished   Right Lower Lobe Diminished   Left Upper Lobe Diminished   Left Lower Lobe Diminished   Additional Respiratory  Assessments   Resp 20   Oral Care Mouth suctioned   Alarm Settings   High Pressure Alarm 45 cmH2O   Low Minute Volume Alarm 2 L/min   High Respiratory Rate 40 br/min   Low Exhaled Vt  200 mL   ETT (adult)   Placement Date/Time: 08/17/21 1216   Tube Size: 7.5 mm  Laryngoscope: GlideScope  Blade Size: 4  Location: Oral  Placement Verified By[de-identified] Colorimetric EtCO2 device  Secured at: 23 cm  Placed By: Licensed provider   Secured at 24 cm   Measured From Lips   ET Placement Right   Secured By Commercial tube stuart   Site Condition Dry   Cuff Pressure 34 cm H2O

## 2021-08-22 ENCOUNTER — APPOINTMENT (OUTPATIENT)
Dept: GENERAL RADIOLOGY | Age: 61
DRG: 870 | End: 2021-08-22

## 2021-08-22 LAB
ALBUMIN SERPL-MCNC: 3 G/DL (ref 3.4–5)
ANION GAP SERPL CALCULATED.3IONS-SCNC: 7 MMOL/L (ref 3–16)
BUN BLDV-MCNC: 39 MG/DL (ref 7–20)
CALCIUM SERPL-MCNC: 7.9 MG/DL (ref 8.3–10.6)
CHLORIDE BLD-SCNC: 101 MMOL/L (ref 99–110)
CO2: 26 MMOL/L (ref 21–32)
CREAT SERPL-MCNC: 0.8 MG/DL (ref 0.6–1.2)
GFR AFRICAN AMERICAN: >60
GFR NON-AFRICAN AMERICAN: >60
GLUCOSE BLD-MCNC: 127 MG/DL (ref 70–99)
GLUCOSE BLD-MCNC: 154 MG/DL (ref 70–99)
GLUCOSE BLD-MCNC: 168 MG/DL (ref 70–99)
GLUCOSE BLD-MCNC: 176 MG/DL (ref 70–99)
GLUCOSE BLD-MCNC: 177 MG/DL (ref 70–99)
GLUCOSE BLD-MCNC: 177 MG/DL (ref 70–99)
GLUCOSE BLD-MCNC: 196 MG/DL (ref 70–99)
GLUCOSE BLD-MCNC: 240 MG/DL (ref 70–99)
PERFORMED ON: ABNORMAL
PHOSPHORUS: 4 MG/DL (ref 2.5–4.9)
POTASSIUM SERPL-SCNC: 5 MMOL/L (ref 3.5–5.1)
SODIUM BLD-SCNC: 134 MMOL/L (ref 136–145)

## 2021-08-22 PROCEDURE — 6360000002 HC RX W HCPCS: Performed by: INTERNAL MEDICINE

## 2021-08-22 PROCEDURE — 6370000000 HC RX 637 (ALT 250 FOR IP): Performed by: INTERNAL MEDICINE

## 2021-08-22 PROCEDURE — 2580000003 HC RX 258: Performed by: INTERNAL MEDICINE

## 2021-08-22 PROCEDURE — 87186 SC STD MICRODIL/AGAR DIL: CPT

## 2021-08-22 PROCEDURE — 87150 DNA/RNA AMPLIFIED PROBE: CPT

## 2021-08-22 PROCEDURE — 71045 X-RAY EXAM CHEST 1 VIEW: CPT

## 2021-08-22 PROCEDURE — 6360000002 HC RX W HCPCS: Performed by: NURSE PRACTITIONER

## 2021-08-22 PROCEDURE — C9113 INJ PANTOPRAZOLE SODIUM, VIA: HCPCS | Performed by: NURSE PRACTITIONER

## 2021-08-22 PROCEDURE — 6370000000 HC RX 637 (ALT 250 FOR IP): Performed by: NURSE PRACTITIONER

## 2021-08-22 PROCEDURE — 2500000003 HC RX 250 WO HCPCS: Performed by: INTERNAL MEDICINE

## 2021-08-22 PROCEDURE — 94761 N-INVAS EAR/PLS OXIMETRY MLT: CPT

## 2021-08-22 PROCEDURE — 99291 CRITICAL CARE FIRST HOUR: CPT | Performed by: INTERNAL MEDICINE

## 2021-08-22 PROCEDURE — 36415 COLL VENOUS BLD VENIPUNCTURE: CPT

## 2021-08-22 PROCEDURE — 2700000000 HC OXYGEN THERAPY PER DAY

## 2021-08-22 PROCEDURE — 2000000000 HC ICU R&B

## 2021-08-22 PROCEDURE — 80069 RENAL FUNCTION PANEL: CPT

## 2021-08-22 PROCEDURE — 94003 VENT MGMT INPAT SUBQ DAY: CPT

## 2021-08-22 PROCEDURE — 87040 BLOOD CULTURE FOR BACTERIA: CPT

## 2021-08-22 RX ORDER — FUROSEMIDE 10 MG/ML
40 INJECTION INTRAMUSCULAR; INTRAVENOUS ONCE
Status: COMPLETED | OUTPATIENT
Start: 2021-08-22 | End: 2021-08-22

## 2021-08-22 RX ADMIN — FENTANYL CITRATE 250 MCG/HR: 0.05 INJECTION, SOLUTION INTRAMUSCULAR; INTRAVENOUS at 05:39

## 2021-08-22 RX ADMIN — CARBOXYMETHYLCELLULOSE SODIUM 1 DROP: 10 GEL OPHTHALMIC at 16:11

## 2021-08-22 RX ADMIN — MUPIROCIN: 20 OINTMENT TOPICAL at 09:28

## 2021-08-22 RX ADMIN — PAROXETINE HYDROCHLORIDE 40 MG: 20 TABLET, FILM COATED ORAL at 09:27

## 2021-08-22 RX ADMIN — FENTANYL CITRATE 250 MCG/HR: 0.05 INJECTION, SOLUTION INTRAMUSCULAR; INTRAVENOUS at 01:13

## 2021-08-22 RX ADMIN — SODIUM CHLORIDE, PRESERVATIVE FREE 10 ML: 5 INJECTION INTRAVENOUS at 09:28

## 2021-08-22 RX ADMIN — ACETAMINOPHEN 650 MG: 325 TABLET ORAL at 23:16

## 2021-08-22 RX ADMIN — INSULIN LISPRO 1 UNITS: 100 INJECTION, SOLUTION INTRAVENOUS; SUBCUTANEOUS at 23:20

## 2021-08-22 RX ADMIN — Medication 15 ML: at 20:12

## 2021-08-22 RX ADMIN — CARBOXYMETHYLCELLULOSE SODIUM 1 DROP: 10 GEL OPHTHALMIC at 09:33

## 2021-08-22 RX ADMIN — MIDAZOLAM 10 MG/HR: 5 INJECTION INTRAMUSCULAR; INTRAVENOUS at 21:50

## 2021-08-22 RX ADMIN — MUPIROCIN: 20 OINTMENT TOPICAL at 20:11

## 2021-08-22 RX ADMIN — MIDAZOLAM 10 MG/HR: 5 INJECTION INTRAMUSCULAR; INTRAVENOUS at 00:00

## 2021-08-22 RX ADMIN — Medication 0.6 MCG/KG/HR: at 07:11

## 2021-08-22 RX ADMIN — INSULIN LISPRO 1 UNITS: 100 INJECTION, SOLUTION INTRAVENOUS; SUBCUTANEOUS at 05:44

## 2021-08-22 RX ADMIN — MIDAZOLAM 10 MG/HR: 5 INJECTION INTRAMUSCULAR; INTRAVENOUS at 09:45

## 2021-08-22 RX ADMIN — CARBOXYMETHYLCELLULOSE SODIUM 1 DROP: 10 GEL OPHTHALMIC at 20:12

## 2021-08-22 RX ADMIN — CISATRACURIUM BESYLATE 0.5 MCG/KG/MIN: 10 INJECTION, SOLUTION INTRAVENOUS at 01:13

## 2021-08-22 RX ADMIN — INSULIN LISPRO 1 UNITS: 100 INJECTION, SOLUTION INTRAVENOUS; SUBCUTANEOUS at 20:52

## 2021-08-22 RX ADMIN — CARBOXYMETHYLCELLULOSE SODIUM 1 DROP: 10 GEL OPHTHALMIC at 23:18

## 2021-08-22 RX ADMIN — MONTELUKAST SODIUM 10 MG: 10 TABLET ORAL at 20:13

## 2021-08-22 RX ADMIN — METHYLPREDNISOLONE SODIUM SUCCINATE 40 MG: 40 INJECTION, POWDER, FOR SOLUTION INTRAMUSCULAR; INTRAVENOUS at 09:26

## 2021-08-22 RX ADMIN — ENOXAPARIN SODIUM 30 MG: 30 INJECTION SUBCUTANEOUS at 09:27

## 2021-08-22 RX ADMIN — Medication 0.2 MCG/KG/HR: at 21:50

## 2021-08-22 RX ADMIN — SODIUM CHLORIDE, PRESERVATIVE FREE 10 ML: 5 INJECTION INTRAVENOUS at 20:12

## 2021-08-22 RX ADMIN — PROPOFOL 50 MCG/KG/MIN: 10 INJECTION, EMULSION INTRAVENOUS at 20:07

## 2021-08-22 RX ADMIN — PROPOFOL 50 MCG/KG/MIN: 10 INJECTION, EMULSION INTRAVENOUS at 00:00

## 2021-08-22 RX ADMIN — CARBOXYMETHYLCELLULOSE SODIUM 1 DROP: 10 GEL OPHTHALMIC at 12:57

## 2021-08-22 RX ADMIN — Medication 15 ML: at 09:33

## 2021-08-22 RX ADMIN — ENOXAPARIN SODIUM 30 MG: 30 INJECTION SUBCUTANEOUS at 20:11

## 2021-08-22 RX ADMIN — Medication 2000 UNITS: at 09:27

## 2021-08-22 RX ADMIN — METHYLPREDNISOLONE SODIUM SUCCINATE 40 MG: 40 INJECTION, POWDER, FOR SOLUTION INTRAMUSCULAR; INTRAVENOUS at 20:11

## 2021-08-22 RX ADMIN — FENTANYL CITRATE 250 MCG/HR: 0.05 INJECTION, SOLUTION INTRAMUSCULAR; INTRAVENOUS at 18:13

## 2021-08-22 RX ADMIN — PROPOFOL 50 MCG/KG/MIN: 10 INJECTION, EMULSION INTRAVENOUS at 08:07

## 2021-08-22 RX ADMIN — FUROSEMIDE 40 MG: 10 INJECTION, SOLUTION INTRAMUSCULAR; INTRAVENOUS at 16:11

## 2021-08-22 RX ADMIN — FENTANYL CITRATE 250 MCG/HR: 0.05 INJECTION, SOLUTION INTRAMUSCULAR; INTRAVENOUS at 23:19

## 2021-08-22 RX ADMIN — PROPOFOL 50 MCG/KG/MIN: 10 INJECTION, EMULSION INTRAVENOUS at 16:12

## 2021-08-22 RX ADMIN — PROPOFOL 50 MCG/KG/MIN: 10 INJECTION, EMULSION INTRAVENOUS at 12:53

## 2021-08-22 RX ADMIN — PANTOPRAZOLE SODIUM 40 MG: 40 INJECTION, POWDER, FOR SOLUTION INTRAVENOUS at 09:26

## 2021-08-22 RX ADMIN — FENTANYL CITRATE 250 MCG/HR: 0.05 INJECTION, SOLUTION INTRAMUSCULAR; INTRAVENOUS at 13:53

## 2021-08-22 RX ADMIN — INSULIN LISPRO 1 UNITS: 100 INJECTION, SOLUTION INTRAVENOUS; SUBCUTANEOUS at 16:13

## 2021-08-22 RX ADMIN — DOCUSATE SODIUM 50 MG AND SENNOSIDES 8.6 MG 2 TABLET: 8.6; 5 TABLET, FILM COATED ORAL at 09:26

## 2021-08-22 RX ADMIN — PROPOFOL 50 MCG/KG/MIN: 10 INJECTION, EMULSION INTRAVENOUS at 04:40

## 2021-08-22 RX ADMIN — FENTANYL CITRATE 250 MCG/HR: 0.05 INJECTION, SOLUTION INTRAMUSCULAR; INTRAVENOUS at 09:47

## 2021-08-22 RX ADMIN — INSULIN LISPRO 1 UNITS: 100 INJECTION, SOLUTION INTRAVENOUS; SUBCUTANEOUS at 12:58

## 2021-08-22 RX ADMIN — ROCURONIUM BROMIDE 25 MG: 10 SOLUTION INTRAVENOUS at 00:33

## 2021-08-22 ASSESSMENT — PULMONARY FUNCTION TESTS
PIF_VALUE: 33
PIF_VALUE: 34
PIF_VALUE: 34
PIF_VALUE: 32
PIF_VALUE: 34
PIF_VALUE: 33
PIF_VALUE: 34
PIF_VALUE: 33
PIF_VALUE: 33
PIF_VALUE: 32
PIF_VALUE: 33
PIF_VALUE: 34
PIF_VALUE: 32
PIF_VALUE: 32

## 2021-08-22 ASSESSMENT — PAIN SCALES - GENERAL
PAINLEVEL_OUTOF10: 0

## 2021-08-22 NOTE — PROGRESS NOTES
Hospitalist Progress Note      PCP: No primary care provider on file.     Date of Admission: 8/12/2021    Chief Complaint:   covid/dehydration     Hospital Course: reviewed      Subjective:  overnight events noted, Remains on vent/sedation(still requiring precedex, fentanyl, versed, propofol), remains intubated , now proned again and paralyzed      Medications:  Reviewed    Infusion Medications    cisatracurium (NIMBEX) infusion 1 mcg/kg/min (08/22/21 0200)    dexmedetomidine HCl in NaCl 0.6 mcg/kg/hr (08/22/21 0711)    midazolam 10 mg/hr (08/22/21 0945)    dextrose      propofol 50 mcg/kg/min (08/22/21 0807)    fentaNYL (SUBLIMAZE) infusion 250 mcg/hr (08/22/21 0947)    norepinephrine Stopped (08/21/21 2224)    sodium chloride       Scheduled Medications    oxyCODONE  5 mg Oral 4 times per day    sennosides-docusate sodium  2 tablet Oral Daily    insulin lispro  0-6 Units Subcutaneous Q4H    mupirocin   Nasal BID    pantoprazole  40 mg Intravenous Daily    carboxymethylcellulose PF  1 drop Both Eyes Q4H    chlorhexidine  15 mL Mouth/Throat BID    montelukast  10 mg Oral Nightly    PARoxetine  40 mg Oral Daily    sodium chloride flush  5-40 mL Intravenous 2 times per day    methylPREDNISolone  40 mg Intravenous Q12H    Vitamin D  2,000 Units Oral Daily    enoxaparin  30 mg Subcutaneous BID     PRN Meds: rocuronium, midazolam, glucose, dextrose, glucagon (rDNA), dextrose, potassium chloride, guaiFENesin-dextromethorphan, benzocaine-menthol, zolpidem, sodium chloride flush, sodium chloride, ondansetron **OR** ondansetron, polyethylene glycol, acetaminophen **OR** acetaminophen      Intake/Output Summary (Last 24 hours) at 8/22/2021 1149  Last data filed at 8/22/2021 0932  Gross per 24 hour   Intake 1565 ml   Output 1475 ml   Net 90 ml       Physical Exam Performed:    BP (!) 187/74   Pulse 73   Temp 99.7 °F (37.6 °C) (Bladder)   Resp 20   Ht 5' (1.524 m)   Wt 185 lb 13.6 oz (84.3 kg) LMP 04/15/2012   SpO2 90%   BMI 36.30 kg/m²     General appearance:   appears stated age and cooperative. Obese, intubated,  proned again  HEENT:  Normal cephalic, atraumatic without obvious deformity. Pupils equal, round, and reactive to light.  Extra ocular muscles intact. Conjunctivae/corneas clear. Neck: Supple, with full range of motion. No jugular venous distention. Trachea midline. Respiratory:  inc'd respiratory effort. Clear to auscultation, bilaterally without Wheezes/Rhonchi. no obvious bibas rales  Cardiovascular:  Regular rate and rhythm with normal S1/S2 without murmurs, rubs or gallops. Abdomen: Soft, non-tender, non-distended with normal bowel sounds. Musculoskeletal:  No clubbing, cyanosis or edema bilaterally.  Full range of motion without deformity. Skin: Skin color, texture, turgor normal.  No rashes or lesions. Neurologic:  Neurovascularly intact without any focal sensory/motor deficits. Cranial nerves: II-XII intact, grossly non-focal.  Psychiatric:  Alert and oriented x0, sedated  Capillary Refill: Brisk,3 seconds, normal  Peripheral Pulses: +2 palpable, equal bilaterally       Labs:   Recent Labs     08/21/21  0520   WBC 6.5   HGB 10.5*   HCT 30.7*        Recent Labs     08/20/21  0520 08/21/21  0520 08/22/21  0530    136 134*   K 4.5 4.8 5.0    102 101   CO2 24 23 26   BUN 31* 36* 39*   CREATININE 0.8 0.9 0.8   CALCIUM 7.6* 7.9* 7.9*   PHOS 3.3 3.1 4.0     No results for input(s): AST, ALT, BILIDIR, BILITOT, ALKPHOS in the last 72 hours. No results for input(s): INR in the last 72 hours. No results for input(s): Caro Lager in the last 72 hours.     Urinalysis:      Lab Results   Component Value Date    NITRU Negative 08/12/2021    WBCUA 10-20 08/12/2021    BACTERIA 1+ 08/12/2021    RBCUA 3-4 08/12/2021    BLOODU TRACE-INTACT 08/12/2021    SPECGRAV >=1.030 08/12/2021    GLUCOSEU Negative 08/12/2021       Radiology:  XR CHEST PORTABLE   Final Result   No change in the tubes and catheters. Stable cardiomegaly with worsening pulmonary edema. Increasing airspace opacity throughout both lungs which could be due to   pulmonary edema and/or worsening pneumonia      Questionable small bibasilar pleural effusions which are more prominent         XR ABDOMEN FOR NG/OG/NE TUBE PLACEMENT   Final Result   Status post placement of nasogastric tube with distal tip located in the   region the stomach as described above. XR CHEST PORTABLE   Final Result   1. Status post placement of endotracheal tube with distal tip located   approximately 3.5 cm above the elsi. 2. Status post placement of right internal jugular central line with distal   tip located near junction of superior vena cava and right atrium. No   evidence of pneumothorax. XR CHEST PORTABLE   Final Result   Diffuse bilateral airspace disease. Aeration is improved compared to prior   study. XR CHEST PORTABLE   Final Result   Significant worsening in appearance of bilateral pneumonia         CT Head WO Contrast   Final Result   No acute intracranial abnormality. CT ABDOMEN PELVIS W IV CONTRAST Additional Contrast? None   Final Result   1. Mucosal thickening and enhancement of the terminal ileum, proximal colon   and sigmoid colon/rectum. Pattern is nonspecific and suggests underlying   enterocolitis. Infectious or inflammatory etiologies may be considered. 2. No pattern of bowel obstruction. 3. Hepatic steatosis. 4. Airspace changes in the lower chest likely represent underlying viral   pneumonia. XR CHEST PORTABLE   Final Result   Perihilar opacities could represent COVID pneumonia given indication. Correlate with presentation to exclude superimposed congestive heart failure.                  Assessment/Plan:    Active Hospital Problems    Diagnosis     Acute respiratory failure with hypoxia (HCC) [J96.01]     Altered mental status [R41.82]     Mild intermittent asthma without complication [K99.09]     CINDY on CPAP [G47.33, Z99.89]     Elevated transaminase level [R74.01]     Hepatic steatosis [K76.0]     Obesity (BMI 35.0-39.9 without comorbidity) [E66.9]     2019 novel coronavirusinfected pneumonia (NCIP) [U07.1, J12.82]          Sepsis - due to covid, with tachypnea/tachycardia and +rapid test, pt was recently diagnosed on 8/11  -supportive care  -ivfs given  -lactate was wnl  -no abx given viral etiology     Acute encephalopathy- seemed to have improved with ivfs, due to ?covid, CThead no acute abn  -tele  -neurochecks were ordered     Acute resp failure- with sats 88% ra in ER per staff, likely from covid, pt is unvaccinated  -iv solumedrol q12h ordered,tx for 10 days  -supportive care  -tele  -ID consulted, apprec recs, Per ID note( Date of symptom onset 8/7/21, Date of diagnosis and admission 8/13/21 (date of outpt +test unclear))  - started on Actemra on 8/16   -pulm consulted given worsening   -intubated 8/17, proned as well, reproned on 8/22 early AM     N/v/abd pain/diarrhea- likely from covid, CTa/p done(suggestive of enterocolitis, no obstruction, noted hepatic steatosis)  -continue supportive care  - cdiff negative  -ua was abn and ucx > 50k mixed mike, no UTI at this time     Asthma- on home singulair     Anxiety-continued paxil        DVT Prophylaxis: lovenox bid  Diet: Diet NPO  ADULT TUBE FEEDING; Nasogastric; Peptide Based High Protein; Continuous; 10; Yes; 10; Q 6 hours; 30; 60; Q 4 hours; Protein; Administer 2 proteinex modulars daily through NGT.  Flush with 30 Ml free water  Code Status: Full Code       PT/OT Eval Status: not possible     Dispo -unclear, icu care, defer weaning of propofol to pulm/cc(given very high TGs), defer code status discussion to pulm/cc    Nancy Vasquez MD

## 2021-08-22 NOTE — PLAN OF CARE
Problem: Airway Clearance - Ineffective  Goal: Achieve or maintain patent airway  Outcome: Ongoing     Problem: Gas Exchange - Impaired  Goal: Absence of hypoxia  Outcome: Ongoing     Problem: Gas Exchange - Impaired  Goal: Promote optimal lung function  Outcome: Ongoing     Problem: Breathing Pattern - Ineffective  Goal: Ability to achieve and maintain a regular respiratory rate  Outcome: Ongoing     Problem: Body Temperature -  Risk of, Imbalanced  Goal: Ability to maintain a body temperature within defined limits  Outcome: Ongoing     Problem: Body Temperature -  Risk of, Imbalanced  Goal: Will regain or maintain usual level of consciousness  Outcome: Ongoing     Problem:  Body Temperature -  Risk of, Imbalanced  Goal: Complications related to the disease process, condition or treatment will be avoided or minimized  Outcome: Ongoing     Problem: Isolation Precautions - Risk of Spread of Infection  Goal: Prevent transmission of infection  Outcome: Ongoing     Problem: Nutrition Deficits  Goal: Optimize nutritional status  Outcome: Ongoing     Problem: Risk for Fluid Volume Deficit  Goal: Maintain normal heart rhythm  Outcome: Ongoing     Problem: Risk for Fluid Volume Deficit  Goal: Maintain absence of muscle cramping  Outcome: Ongoing     Problem: Risk for Fluid Volume Deficit  Goal: Maintain normal serum potassium, sodium, calcium, phosphorus, and pH  Outcome: Ongoing     Problem: Loneliness or Risk for Loneliness  Goal: Demonstrate positive use of time alone when socialization is not possible  Outcome: Ongoing     Problem: Fatigue  Goal: Verbalize increase energy and improved vitality  Outcome: Ongoing     Problem: Patient Education: Go to Patient Education Activity  Goal: Patient/Family Education  Outcome: Ongoing     Problem: Skin Integrity:  Goal: Will show no infection signs and symptoms  Description: Will show no infection signs and symptoms  Outcome: Ongoing     Problem: Skin Integrity:  Goal: Absence of new skin breakdown  Description: Absence of new skin breakdown  Outcome: Ongoing     Problem: Nutrition  Goal: Optimal nutrition therapy  Outcome: Ongoing

## 2021-08-22 NOTE — PROGRESS NOTES
Preparedness and Response Supplemental Appropriations Act, this clinical encounter was conducted to provide necessary medical care. (Also consistent with new provisions and guidance offered by [facility] on March 18, 2020 in setting of COVID 19 outbreak and in order to preserve personal protective equipment in accordance with the flexibilities announced by CMS on March 30, 2020)   References: https://med. [email or web address]/Portals/0/Resources/COVID-19/3_18%20Telemed%20Guidance%20Updated%20March%2018. pdf?[email or web address]                      [email or web address]. [facility]/Portals/0/Resources/COVID-19/3_18%20Telemed%20Guidance%20Updated%20March%2018. pdf?btv=[email or web address]                      http://[email or web address] pdf             Medications:  Scheduled Meds:   oxyCODONE  5 mg Oral 4 times per day    sennosides-docusate sodium  2 tablet Oral Daily    insulin lispro  0-6 Units Subcutaneous Q4H    mupirocin   Nasal BID    pantoprazole  40 mg Intravenous Daily    carboxymethylcellulose PF  1 drop Both Eyes Q4H    chlorhexidine  15 mL Mouth/Throat BID    montelukast  10 mg Oral Nightly    PARoxetine  40 mg Oral Daily    sodium chloride flush  5-40 mL Intravenous 2 times per day    methylPREDNISolone  40 mg Intravenous Q12H    Vitamin D  2,000 Units Oral Daily    enoxaparin  30 mg Subcutaneous BID       PRN Meds:  rocuronium, midazolam, glucose, dextrose, glucagon (rDNA), dextrose, potassium chloride, guaiFENesin-dextromethorphan, benzocaine-menthol, zolpidem, sodium chloride flush, sodium chloride, ondansetron **OR** ondansetron, polyethylene glycol, acetaminophen **OR** acetaminophen    Results:  CBC:   Recent Labs     08/21/21  0520   WBC 6.5   HGB 10.5*   HCT 30.7*   MCV 90.7        BMP:   Recent Labs     08/20/21  0520 08/21/21  0520 08/22/21  0530    136 134*   K 4.5 4.8 5.0    102 101   CO2 24 23 26   PHOS 3.3 3.1 4.0 BUN 31* 36* 39*   CREATININE 0.8 0.9 0.8     Results for Jamil Bun (MRN [id number]) as of 8/22/2021 11:51   Ref. Range 8/16/2021 13:09 8/17/2021 15:19 8/18/2021 05:38 8/19/2021 06:15 8/21/2021 05:20   WBC Latest Ref Range: 4.0 - 11.0 K/uL 3.2 (L) 3.5 (L) 5.0 11.3 (H) 6.5   RBC Latest Ref Range: 4.00 - 5.20 M/uL 4.02 3.84 (L) 4.08 3.90 (L) 3.39 (L)   Hemoglobin Quant Latest Ref Range: 12.0 - 16.0 g/dL 12.6 12.2 13.2 13.0 10.5 (L)   Hematocrit Latest Ref Range: 36.0 - 48.0 % 36.2 34.8 (L) 37.2 35.7 (L) 30.7 (L)   MCV Latest Ref Range: 80.0 - 100.0 fL 90.2 90.7 91.1 91.6 90.7   MCH Latest Ref Range: 26.0 - 34.0 pg 31.3 31.7 32.3 33.4 31.0   MCHC Latest Ref Range: 31.0 - 36.0 g/dL 34.7 34.9 35.5 36.5 (H) 34.2   MPV Latest Ref Range: 5.0 - 10.5 fL 8.6 8.5 7.9 8.2 9.0   RDW Latest Ref Range: 12.4 - 15.4 % 13.6 13.6 13.4 13.4 13.3   Platelet Count Latest Ref Range: 135 - 450 K/uL 175 211 278 396 245   Neutrophils % Latest Units: %  80.8  92.9 89.7   Lymphocyte % Latest Units: %  12.0  2.7 7.2   Monocytes % Latest Units: %  7.0  4.1 2.5   Eosinophils % Latest Units: %  0.0  0.0 0.4   Basophils % Latest Units: %  0.2  0.3 0.2   Neutrophils Absolute Latest Ref Range: 1.7 - 7.7 K/uL  2.8  10.5 (H) 5.8     Results for Jamil Bun (MRN [id number]) as of 8/22/2021 11:51   Ref. Range 8/13/2021 01:03 8/13/2021 22:16   C DIFF TOXIN/ANTIGEN Unknown  Rpt   C.diff Toxin/Antigen Unknown  Negative for Clos. .. SARS-CoV-2, NAAT Latest Ref Range: Not Detected  DETECTED (AA)      Results for Jamil Bun (MRN [id number]) as of 8/22/2021 11:51   Ref.  Range 8/12/2021 19:10   Color, UA Latest Ref Range: Straw/Yellow  Yellow   Clarity, UA Latest Ref Range: Clear  SL CLOUDY (A)   Glucose, UA Latest Ref Range: Negative mg/dL Negative   Bilirubin, Urine Latest Ref Range: Negative  MODERATE (A)   Ketones, Urine Latest Ref Range: Negative mg/dL 40 (A)   Specific [place], UA Latest Ref Range: 1.005 - 1.030  >=1.030   Blood, Urine Latest Ref Range: Negative  TRACE-INTACT (A)   pH, UA Latest Ref Range: 5.0 - 8.0  5.0   Protein, UA Latest Ref Range: Negative mg/dL 100 (A)   Urobilinogen, Urine Latest Ref Range: <2.0 E.U./dL 0.2   Nitrite, Urine Latest Ref Range: Negative  Negative   Leukocyte Esterase, Urine Latest Ref Range: Negative  Negative   Urine Type Unknown NotGiven   Urine Reflex to Culture Unknown Yes   Coarse Casts, UA Latest Ref Range: 0 - 2 /LPF 0-2   Fine Casts, UA Latest Ref Range: 0 - 2 /LPF 3-5 (A)   WBC, UA Latest Ref Range: 0 - 5 /HPF 10-20 (A)   RBC, UA Latest Ref Range: 0 - 4 /HPF 3-4   Epithelial Cells, UA Latest Ref Range: 0 - 5 /HPF 11-20 (A)   Bacteria, UA Latest Ref Range: None Seen /HPF 1+ (A)   Amorphous, UA Latest Units: /HPF 1+   Microscopic Examination Unknown YES   URINE RT REFLEX TO CULTURE Unknown Rpt (A)     ONE XRAY VIEW OF THE CHEST       8/22/2021 12:26 am       COMPARISON:   08/17/2021       HISTORY:   ORDERING SYSTEM PROVIDED HISTORY: code   TECHNOLOGIST PROVIDED HISTORY:   Reason for exam:->code   Reason for Exam: covid +, cardiac arrest   Acuity: Acute   Type of Exam: Initial       FINDINGS:   The heart is mildly enlarged and unchanged.  The pulmonary vessels are   engorged centrally and more prominent.  There is moderate airspace opacity   throughout both lungs which is increased.  There is an ET tube, gastric tube,   and right IJ catheter in place which are unchanged.  There is mild blunting   of the costophrenic sulci which is more apparent.  There is no pneumothorax.           Impression   No change in the tubes and catheters.       Stable cardiomegaly with worsening pulmonary edema.       Increasing airspace opacity throughout both lungs which could be due to   pulmonary edema and/or worsening pneumonia       Questionable small bibasilar pleural effusions which are more prominent       Assessment and plan:  Acute respiratory failure, hypoxemic.  Ventilatory support to keep saturation being 90-94% only  Patient had to be performed last night because of worsening hypoxemia  Ventilator settings and waveforms reviewed  Ventilator changes made and discussed with the respiratory therapist  IV sedation to maintain patient ventilator synchrony  IV neuromuscular blockade has been started after the patient was prone  Nursing was requested to see if patient's Precedex infusion can be discontinued  Titration of sedation as per RASS score/BIS  Pulmonary toilet  Will give 1 dose of IV Lasix  Monitor input output and BMP  Correct electrolytes on whenever necessary basis  We will trend the inflammatory markers  Acute COVID-19 pneumonia. Presented with GI symptoms and confusion, those had resolved but her respiratory status has worsened. ID following, received Actemra. On IV steroids  ? Bacterial superinfection. Patient's fever is continuing; sputum culture ordered along with procalcitonin levels-will consider starting antibiotics if need be  Abnormal LFT. Did have steatosis on CT, could be related to COVID-19 infection as well. Slightly better on repeat LFT. Hyperglycemia. May need to be monitored and placed on SSI, better controlled  CINDY. On CPAP at home. Details not available. Address when she improves  Bronchial asthma. Mild intermittent as per the patient. Presently no wheezing. On Singulair, added as needed bronchodilator  Obesity. Address when she improves. DVT prophylaxis. On escalated dose of Lovenox at 30 mg SQ every 12 hours      Critical care time spent reviewing labs/films, examining patient, collaborating with other physicians but excluding procedures for life threatening organ failure is 35 minutes . Multidisciplinary rounds were completed at the bedside with participation from the intensivist, pharmacy, nursing, nutrition.   All labs and imaging studies reviewed, discussed        Electronically signed by:  Timur Luis MD    8/22/2021    9:54 AM.

## 2021-08-22 NOTE — PROGRESS NOTES
08/22/21 0411   Vent Information   $Ventilation $Subsequent Day   Vent Type 980   Vent Mode AC/VC+   Vt Ordered 400 mL   Rate Set 20 bmp   Pressure Support 0 cmH20   FiO2  100 %   SpO2 100 %   SpO2/FiO2 ratio 100   Sensitivity 3   PEEP/CPAP 15   I Time/ I Time % 0 s   Humidification Source HME   Vent Patient Data   Peak Inspiratory Pressure 34 cmH2O   Mean Airway Pressure 21 cmH20   Rate Measured 20 br/min   Vt Exhaled 408 mL   Minute Volume 8.15 Liters   I:E Ratio 1:2.20   Cough/Sputum   Sputum How Obtained Endotracheal   Sputum Amount None   Spontaneous Breathing Trial (SBT) RT Doc   Pulse 75   Breath Sounds   Right Upper Lobe Diminished   Right Middle Lobe Diminished   Right Lower Lobe Diminished   Left Upper Lobe Diminished   Left Lower Lobe Diminished   Additional Respiratory  Assessments   Resp 20   Position Prone   Cuff Pressure (cm H2O) 28 cm H2O   Alarm Settings   High Pressure Alarm 55 cmH2O   Low Minute Volume Alarm 2 L/min   High Respiratory Rate 40 br/min   Low Exhaled Vt  200 mL   ETT (adult)   Placement Date/Time: 08/17/21 1216   Tube Size: 7.5 mm  Laryngoscope: GlideScope  Blade Size: 4  Location: Oral  Placement Verified By[de-identified] Colorimetric EtCO2 device  Secured at: 23 cm  Placed By: Licensed provider   Secured at 24 cm   Measured From Lips   ET Placement Left   Secured By Commercial tube stuart   Site Condition Dry

## 2021-08-22 NOTE — PROGRESS NOTES
Patient respiratory arrested while on the ventilator. Code blue was called and patient was bagged to bring saturations up to 100%. Code team was at bedside. Orders were placed for a chest x ray and proned at 1230. Nimbex started. Vitals are currently stable HR 76 /59 MAP 81 SPO2 97%.  Will continue to monitor

## 2021-08-22 NOTE — PROGRESS NOTES
08/21/21 2347   Vent Information   Skin Assessment Clean, dry, & intact   Vent Type 980   Vent Mode AC/VC+   Vt Ordered 400 mL   Rate Set 20 bmp   Pressure Support 0 cmH20   FiO2  100 %   SpO2 94 %   SpO2/FiO2 ratio 94   Sensitivity 3   PEEP/CPAP 15   I Time/ I Time % 0.95 s   Humidification Source HME   Vent Patient Data   Peak Inspiratory Pressure 33 cmH2O   Mean Airway Pressure 21 cmH20   Rate Measured 20 br/min   Vt Exhaled 391 mL   Minute Volume 8.06 Liters   I:E Ratio 1:2.20   Spontaneous Breathing Trial (SBT) RT Doc   Pulse 75   Breath Sounds   Right Upper Lobe Diminished   Right Middle Lobe Diminished   Right Lower Lobe Diminished   Left Upper Lobe Diminished   Left Lower Lobe Diminished   Additional Respiratory  Assessments   Resp 20   Position Semi-Beebe's   Cuff Pressure (cm H2O) 30 cm H2O   Alarm Settings   High Pressure Alarm 45 cmH2O   Low Minute Volume Alarm 2 L/min   High Respiratory Rate 40 br/min   Low Exhaled Vt  200 mL   Patient Observation   Observations Ambu @ bedside.  ETT moved to center   ETT (adult)   Placement Date/Time: 08/17/21 1216   Tube Size: 7.5 mm  Laryngoscope: GlideScope  Blade Size: 4  Location: Oral  Placement Verified By[de-identified] Colorimetric EtCO2 device  Secured at: 23 cm  Placed By: Licensed provider   Secured at 24 cm   Measured From [facility] [address] By Commercial tube stuart   Site Condition Dry   Cuff Pressure 30 cm H2O

## 2021-08-22 NOTE — PROGRESS NOTES
08/22/21 1608   Vent Information   Equipment Changed HME   Vent Type 980   Vent Mode AC/VC+   Vt Ordered 450 mL   Rate Set 20 bmp   Pressure Support 0 cmH20   FiO2  70 %   SpO2 94 %   SpO2/FiO2 ratio 134.29   Sensitivity 3   PEEP/CPAP 15   I Time/ I Time % 0 s   Humidification Source HME   Vent Patient Data   Peak Inspiratory Pressure 33 cmH2O   Mean Airway Pressure 21 cmH20   Rate Measured 20 br/min   Vt Exhaled 455 mL   Minute Volume 9.14 Liters   I:E Ratio 1:2.20   Cough/Sputum   Sputum How Obtained None   Spontaneous Breathing Trial (SBT) RT Doc   Pulse 75   Breath Sounds   Right Upper Lobe Diminished   Right Middle Lobe Diminished   Right Lower Lobe Diminished   Left Upper Lobe Diminished   Left Lower Lobe Diminished   Additional Respiratory  Assessments   Resp 20   Position Prone   Alarm Settings   High Pressure Alarm 55 cmH2O   Low Minute Volume Alarm 2 L/min   High Respiratory Rate 40 br/min   Patient Observation   Observations fio2 decreased to .70   ETT (adult)   Placement Date/Time: 08/17/21 1216   Tube Size: 7.5 mm  Laryngoscope: GlideScope  Blade Size: 4  Location: Oral  Placement Verified By[de-identified] Colorimetric EtCO2 device  Secured at: 23 cm  Placed By: Licensed provider   Secured at 23 cm   Measured From [address] [place] [address],[facility] Floor By Commercial tube stuart

## 2021-08-22 NOTE — PROGRESS NOTES
08/22/21 0815   Vent Information   Vent Type 980   Vent Mode AC/VC+   Vt Ordered 400 mL   Rate Set 20 bmp   Peak Flow 55 L/min   Pressure Support 0 cmH20   FiO2  90 %   SpO2 90 %   SpO2/FiO2 ratio 100   Sensitivity 3   PEEP/CPAP 15   I Time/ I Time % 0 s   Humidification Source HME   Vent Patient Data   Peak Inspiratory Pressure 33 cmH2O   Mean Airway Pressure 21 cmH20   Rate Measured 20 br/min   Vt Exhaled 408 mL   Minute Volume 8.08 Liters   I:E Ratio 1:2.20   Spontaneous Breathing Trial (SBT) RT Doc   Pulse 73   Breath Sounds   Right Upper Lobe Diminished   Right Middle Lobe Diminished   Right Lower Lobe Diminished   Left Upper Lobe Diminished   Left Lower Lobe Diminished   Additional Respiratory  Assessments   Resp 20   Position Prone   Alarm Settings   High Pressure Alarm 55 cmH2O   Low Minute Volume Alarm 2 L/min   High Respiratory Rate 40 br/min   Patient Observation   Observations fio2 decreased to 90/  [email or web address]   ETT (adult)   Placement Date/Time: 08/17/21 1216   Tube Size: 7.5 mm  Laryngoscope: GlideScope  Blade Size: 4  Location: Oral  Placement Verified By[de-identified] Colorimetric EtCO2 device  Secured at: 23 cm  Placed By: Licensed provider   Secured at 24 cm   Measured From Lips   ET Placement Left   Secured By Commercial tube stuart   Site Condition Dry

## 2021-08-22 NOTE — PROGRESS NOTES
08/22/21 1145   Vent Information   Vent Type 980   Vent Mode AC/VC+   Vt Ordered 400 mL   Rate Set 20 bmp   Pressure Support 0 cmH20   FiO2  80 %   SpO2 100 %   SpO2/FiO2 ratio 125   Sensitivity 3   PEEP/CPAP 15   I Time/ I Time % 0 s   Vent Patient Data   Peak Inspiratory Pressure 33 cmH2O   Mean Airway Pressure 21 cmH20   Rate Measured 20 br/min   Vt Exhaled 406 mL   Minute Volume 8.13 Liters   I:E Ratio 1:2.20   Cough/Sputum   Sputum How Obtained None   Spontaneous Breathing Trial (SBT) RT Doc   Pulse 73   Breath Sounds   Right Upper Lobe Diminished   Right Middle Lobe Diminished   Right Lower Lobe Diminished   Left Upper Lobe Diminished   Left Lower Lobe Diminished   Additional Respiratory  Assessments   Resp 20   Position Prone   Alarm Settings   High Pressure Alarm 55 cmH2O   Low Minute Volume Alarm 2 L/min   High Respiratory Rate 40 br/min   Patient Observation   Observations fio2 decreased to .80   ETT (adult)   Placement Date/Time: 08/17/21 1216   Tube Size: 7.5 mm  Laryngoscope: GlideScope  Blade Size: 4  Location: Oral  Placement Verified By[de-identified] Colorimetric EtCO2 device  Secured at: 23 cm  Placed By: Licensed provider   Secured at 23 cm   Measured From Teeth   ET Placement Center   Secured By Commercial tube stuart   Site Condition Dry

## 2021-08-22 NOTE — PROGRESS NOTES
Code Blue activated for near cardiac arrest.  Patient's O2 saturation dropped precipitously and without warning. HR and BP dipped but she maintained a pulse. No cause could be identified. Her O2 did come back up with breath delivery via BVM. Her PEEP had been at 15 and was increased to 18.5. She was placed back on the vent with these new settings (FiO2 100%). She again desaturated into the low 70's and had to be bagged again. She had no cuff leak. She was receiving appropriate tidal volumes on the vent at acceptable pressures even though her PEEP was 18.5. She had no e/o PTX which was confirmed by CXR. Decision made to paralyze and prone again out of suspicion hypoxemia was being caused by worsening VQ mismatch in the supine position.

## 2021-08-23 PROBLEM — B95.61 BACTEREMIA DUE TO STAPHYLOCOCCUS AUREUS: Status: ACTIVE | Noted: 2021-08-23

## 2021-08-23 PROBLEM — A41.9 SEPTIC SHOCK (HCC): Status: ACTIVE | Noted: 2021-08-23

## 2021-08-23 PROBLEM — R79.89 ELEVATED D-DIMER: Status: ACTIVE | Noted: 2021-08-23

## 2021-08-23 PROBLEM — R78.81 BACTEREMIA DUE TO STAPHYLOCOCCUS AUREUS: Status: ACTIVE | Noted: 2021-08-23

## 2021-08-23 PROBLEM — R65.21 SEPTIC SHOCK (HCC): Status: ACTIVE | Noted: 2021-08-23

## 2021-08-23 LAB
ALBUMIN SERPL-MCNC: 2.7 G/DL (ref 3.4–5)
ANION GAP SERPL CALCULATED.3IONS-SCNC: 10 MMOL/L (ref 3–16)
BANDED NEUTROPHILS RELATIVE PERCENT: 60 % (ref 0–7)
BASE EXCESS ARTERIAL: 0.1 MMOL/L (ref -3–3)
BASOPHILIC STIPPLING: ABNORMAL
BASOPHILS ABSOLUTE: 0 K/UL (ref 0–0.2)
BASOPHILS RELATIVE PERCENT: 0 %
BUN BLDV-MCNC: 48 MG/DL (ref 7–20)
C-REACTIVE PROTEIN: 31 MG/L (ref 0–5.1)
CALCIUM SERPL-MCNC: 8.1 MG/DL (ref 8.3–10.6)
CARBOXYHEMOGLOBIN ARTERIAL: 0.3 % (ref 0–1.5)
CHLORIDE BLD-SCNC: 101 MMOL/L (ref 99–110)
CO2: 23 MMOL/L (ref 21–32)
CREAT SERPL-MCNC: 0.7 MG/DL (ref 0.6–1.2)
D DIMER: 2981 NG/ML DDU (ref 0–229)
EOSINOPHILS ABSOLUTE: 0.3 K/UL (ref 0–0.6)
EOSINOPHILS RELATIVE PERCENT: 4 %
FERRITIN: 349.7 NG/ML (ref 15–150)
GFR AFRICAN AMERICAN: >60
GFR NON-AFRICAN AMERICAN: >60
GLUCOSE BLD-MCNC: 113 MG/DL (ref 70–99)
GLUCOSE BLD-MCNC: 117 MG/DL (ref 70–99)
GLUCOSE BLD-MCNC: 121 MG/DL (ref 70–99)
GLUCOSE BLD-MCNC: 171 MG/DL (ref 70–99)
GLUCOSE BLD-MCNC: 177 MG/DL (ref 70–99)
HCO3 ARTERIAL: 27.2 MMOL/L (ref 21–29)
HCT VFR BLD CALC: 29.4 % (ref 36–48)
HEMOGLOBIN, ART, EXTENDED: 11.2 G/DL (ref 12–16)
HEMOGLOBIN: 10 G/DL (ref 12–16)
LACTIC ACID: 4.4 MMOL/L (ref 0.4–2)
LYMPHOCYTES ABSOLUTE: 0.3 K/UL (ref 1–5.1)
LYMPHOCYTES RELATIVE PERCENT: 5 %
MCH RBC QN AUTO: 30.9 PG (ref 26–34)
MCHC RBC AUTO-ENTMCNC: 34 G/DL (ref 31–36)
MCV RBC AUTO: 91.1 FL (ref 80–100)
METHEMOGLOBIN ARTERIAL: 0.3 %
MONOCYTES ABSOLUTE: 0.1 K/UL (ref 0–1.3)
MONOCYTES RELATIVE PERCENT: 1 %
NEUTROPHILS ABSOLUTE: 6 K/UL (ref 1.7–7.7)
NEUTROPHILS RELATIVE PERCENT: 30 %
O2 SAT, ARTERIAL: 94.9 %
O2 THERAPY: ABNORMAL
PCO2 ARTERIAL: 55.8 MMHG (ref 35–45)
PDW BLD-RTO: 13.2 % (ref 12.4–15.4)
PERFORMED ON: ABNORMAL
PH ARTERIAL: 7.3 (ref 7.35–7.45)
PHOSPHORUS: 3.9 MG/DL (ref 2.5–4.9)
PLATELET # BLD: 176 K/UL (ref 135–450)
PMV BLD AUTO: 8.7 FL (ref 5–10.5)
PO2 ARTERIAL: 83.7 MMHG (ref 75–108)
POLYCHROMASIA: ABNORMAL
POTASSIUM SERPL-SCNC: 4.6 MMOL/L (ref 3.5–5.1)
PROCALCITONIN: 0.27 NG/ML (ref 0–0.15)
RBC # BLD: 3.23 M/UL (ref 4–5.2)
REPORT: NORMAL
SCHISTOCYTES: ABNORMAL
SODIUM BLD-SCNC: 134 MMOL/L (ref 136–145)
TCO2 ARTERIAL: 28.9 MMOL/L
TRIGL SERPL-MCNC: 2437 MG/DL (ref 0–150)
WBC # BLD: 6.7 K/UL (ref 4–11)

## 2021-08-23 PROCEDURE — 82803 BLOOD GASES ANY COMBINATION: CPT

## 2021-08-23 PROCEDURE — 99291 CRITICAL CARE FIRST HOUR: CPT | Performed by: INTERNAL MEDICINE

## 2021-08-23 PROCEDURE — 87077 CULTURE AEROBIC IDENTIFY: CPT

## 2021-08-23 PROCEDURE — 6360000002 HC RX W HCPCS: Performed by: INTERNAL MEDICINE

## 2021-08-23 PROCEDURE — 85379 FIBRIN DEGRADATION QUANT: CPT

## 2021-08-23 PROCEDURE — 94761 N-INVAS EAR/PLS OXIMETRY MLT: CPT

## 2021-08-23 PROCEDURE — 86140 C-REACTIVE PROTEIN: CPT

## 2021-08-23 PROCEDURE — 82728 ASSAY OF FERRITIN: CPT

## 2021-08-23 PROCEDURE — 85025 COMPLETE CBC W/AUTO DIFF WBC: CPT

## 2021-08-23 PROCEDURE — 6370000000 HC RX 637 (ALT 250 FOR IP): Performed by: INTERNAL MEDICINE

## 2021-08-23 PROCEDURE — 2580000003 HC RX 258: Performed by: INTERNAL MEDICINE

## 2021-08-23 PROCEDURE — 86403 PARTICLE AGGLUT ANTBDY SCRN: CPT

## 2021-08-23 PROCEDURE — 6360000002 HC RX W HCPCS: Performed by: NURSE PRACTITIONER

## 2021-08-23 PROCEDURE — 83605 ASSAY OF LACTIC ACID: CPT

## 2021-08-23 PROCEDURE — 84478 ASSAY OF TRIGLYCERIDES: CPT

## 2021-08-23 PROCEDURE — 87070 CULTURE OTHR SPECIMN AEROBIC: CPT

## 2021-08-23 PROCEDURE — 6370000000 HC RX 637 (ALT 250 FOR IP): Performed by: NURSE PRACTITIONER

## 2021-08-23 PROCEDURE — 2500000003 HC RX 250 WO HCPCS: Performed by: INTERNAL MEDICINE

## 2021-08-23 PROCEDURE — 87186 SC STD MICRODIL/AGAR DIL: CPT

## 2021-08-23 PROCEDURE — 84145 PROCALCITONIN (PCT): CPT

## 2021-08-23 PROCEDURE — 80069 RENAL FUNCTION PANEL: CPT

## 2021-08-23 PROCEDURE — 0BC78ZZ EXTIRPATION OF MATTER FROM LEFT MAIN BRONCHUS, VIA NATURAL OR ARTIFICIAL OPENING ENDOSCOPIC: ICD-10-PCS | Performed by: INTERNAL MEDICINE

## 2021-08-23 PROCEDURE — 0BC38ZZ EXTIRPATION OF MATTER FROM RIGHT MAIN BRONCHUS, VIA NATURAL OR ARTIFICIAL OPENING ENDOSCOPIC: ICD-10-PCS | Performed by: INTERNAL MEDICINE

## 2021-08-23 PROCEDURE — 94003 VENT MGMT INPAT SUBQ DAY: CPT

## 2021-08-23 PROCEDURE — 99292 CRITICAL CARE ADDL 30 MIN: CPT | Performed by: INTERNAL MEDICINE

## 2021-08-23 PROCEDURE — 2580000003 HC RX 258: Performed by: NURSE PRACTITIONER

## 2021-08-23 PROCEDURE — 2700000000 HC OXYGEN THERAPY PER DAY

## 2021-08-23 PROCEDURE — 87205 SMEAR GRAM STAIN: CPT

## 2021-08-23 PROCEDURE — 2500000003 HC RX 250 WO HCPCS: Performed by: NURSE PRACTITIONER

## 2021-08-23 PROCEDURE — 2000000000 HC ICU R&B

## 2021-08-23 PROCEDURE — C9113 INJ PANTOPRAZOLE SODIUM, VIA: HCPCS | Performed by: NURSE PRACTITIONER

## 2021-08-23 PROCEDURE — 31645 BRNCHSC W/THER ASPIR 1ST: CPT | Performed by: INTERNAL MEDICINE

## 2021-08-23 RX ORDER — SODIUM CHLORIDE 9 MG/ML
INJECTION, SOLUTION INTRAVENOUS CONTINUOUS
Status: DISCONTINUED | OUTPATIENT
Start: 2021-08-23 | End: 2021-08-23

## 2021-08-23 RX ORDER — 0.9 % SODIUM CHLORIDE 0.9 %
1400 INTRAVENOUS SOLUTION INTRAVENOUS ONCE
Status: COMPLETED | OUTPATIENT
Start: 2021-08-23 | End: 2021-08-23

## 2021-08-23 RX ADMIN — SODIUM CHLORIDE, PRESERVATIVE FREE 10 ML: 5 INJECTION INTRAVENOUS at 22:18

## 2021-08-23 RX ADMIN — MIDAZOLAM 10 MG/HR: 5 INJECTION INTRAMUSCULAR; INTRAVENOUS at 09:15

## 2021-08-23 RX ADMIN — ALTEPLASE 1 MG: 2.2 INJECTION, POWDER, LYOPHILIZED, FOR SOLUTION INTRAVENOUS at 22:17

## 2021-08-23 RX ADMIN — MIDAZOLAM 10 MG/HR: 5 INJECTION INTRAMUSCULAR; INTRAVENOUS at 20:25

## 2021-08-23 RX ADMIN — CARBOXYMETHYLCELLULOSE SODIUM 1 DROP: 10 GEL OPHTHALMIC at 05:00

## 2021-08-23 RX ADMIN — FENTANYL CITRATE 250 MCG/HR: 0.05 INJECTION, SOLUTION INTRAMUSCULAR; INTRAVENOUS at 04:07

## 2021-08-23 RX ADMIN — INSULIN LISPRO 1 UNITS: 100 INJECTION, SOLUTION INTRAVENOUS; SUBCUTANEOUS at 06:12

## 2021-08-23 RX ADMIN — ACETAMINOPHEN 650 MG: 325 TABLET ORAL at 14:17

## 2021-08-23 RX ADMIN — DEXTROSE MONOHYDRATE 5 MCG/MIN: 50 INJECTION, SOLUTION INTRAVENOUS at 12:00

## 2021-08-23 RX ADMIN — Medication 2000 UNITS: at 09:04

## 2021-08-23 RX ADMIN — SODIUM CHLORIDE: 9 INJECTION, SOLUTION INTRAVENOUS at 19:04

## 2021-08-23 RX ADMIN — CARBOXYMETHYLCELLULOSE SODIUM 1 DROP: 10 GEL OPHTHALMIC at 12:25

## 2021-08-23 RX ADMIN — FENTANYL CITRATE 250 MCG/HR: 0.05 INJECTION, SOLUTION INTRAMUSCULAR; INTRAVENOUS at 22:41

## 2021-08-23 RX ADMIN — CISATRACURIUM BESYLATE 1.5 MCG/KG/MIN: 10 INJECTION, SOLUTION INTRAVENOUS at 10:08

## 2021-08-23 RX ADMIN — Medication 0.8 MCG/KG/HR: at 10:08

## 2021-08-23 RX ADMIN — PIPERACILLIN AND TAZOBACTAM 3375 MG: 3; .375 INJECTION, POWDER, LYOPHILIZED, FOR SOLUTION INTRAVENOUS at 20:40

## 2021-08-23 RX ADMIN — VASOPRESSIN 0.03 UNITS/MIN: 20 INJECTION INTRAVENOUS at 16:39

## 2021-08-23 RX ADMIN — VANCOMYCIN HYDROCHLORIDE 1500 MG: 10 INJECTION, POWDER, LYOPHILIZED, FOR SOLUTION INTRAVENOUS at 18:35

## 2021-08-23 RX ADMIN — PAROXETINE HYDROCHLORIDE 40 MG: 20 TABLET, FILM COATED ORAL at 09:04

## 2021-08-23 RX ADMIN — SODIUM CHLORIDE, PRESERVATIVE FREE 10 ML: 5 INJECTION INTRAVENOUS at 09:06

## 2021-08-23 RX ADMIN — CARBOXYMETHYLCELLULOSE SODIUM 1 DROP: 10 GEL OPHTHALMIC at 15:53

## 2021-08-23 RX ADMIN — FENTANYL CITRATE 200 MCG/HR: 0.05 INJECTION, SOLUTION INTRAMUSCULAR; INTRAVENOUS at 12:30

## 2021-08-23 RX ADMIN — PROPOFOL 50 MCG/KG/MIN: 10 INJECTION, EMULSION INTRAVENOUS at 04:05

## 2021-08-23 RX ADMIN — Medication 15 ML: at 09:05

## 2021-08-23 RX ADMIN — ACETAMINOPHEN 650 MG: 650 SUPPOSITORY RECTAL at 22:07

## 2021-08-23 RX ADMIN — Medication 15 ML: at 22:23

## 2021-08-23 RX ADMIN — SODIUM BICARBONATE: 84 INJECTION, SOLUTION INTRAVENOUS at 22:15

## 2021-08-23 RX ADMIN — PROPOFOL 50 MCG/KG/MIN: 10 INJECTION, EMULSION INTRAVENOUS at 00:34

## 2021-08-23 RX ADMIN — SODIUM CHLORIDE 700 ML: 9 INJECTION, SOLUTION INTRAVENOUS at 18:36

## 2021-08-23 RX ADMIN — HYDROCORTISONE SODIUM SUCCINATE 100 MG: 100 INJECTION, POWDER, FOR SOLUTION INTRAMUSCULAR; INTRAVENOUS at 18:28

## 2021-08-23 RX ADMIN — FENTANYL CITRATE 250 MCG/HR: 0.05 INJECTION, SOLUTION INTRAMUSCULAR; INTRAVENOUS at 09:15

## 2021-08-23 RX ADMIN — ENOXAPARIN SODIUM 30 MG: 30 INJECTION SUBCUTANEOUS at 09:04

## 2021-08-23 RX ADMIN — ENOXAPARIN SODIUM 80 MG: 80 INJECTION SUBCUTANEOUS at 22:27

## 2021-08-23 RX ADMIN — VASOPRESSIN 0.03 UNITS/MIN: 20 INJECTION INTRAVENOUS at 20:31

## 2021-08-23 RX ADMIN — PHENYLEPHRINE HYDROCHLORIDE 30 MCG/MIN: 10 INJECTION INTRAVENOUS at 18:34

## 2021-08-23 RX ADMIN — CARBOXYMETHYLCELLULOSE SODIUM 1 DROP: 10 GEL OPHTHALMIC at 09:04

## 2021-08-23 RX ADMIN — PANTOPRAZOLE SODIUM 40 MG: 40 INJECTION, POWDER, FOR SOLUTION INTRAVENOUS at 09:04

## 2021-08-23 RX ADMIN — DOCUSATE SODIUM 50 MG AND SENNOSIDES 8.6 MG 2 TABLET: 8.6; 5 TABLET, FILM COATED ORAL at 09:04

## 2021-08-23 ASSESSMENT — PULMONARY FUNCTION TESTS
PIF_VALUE: 33
PIF_VALUE: 43
PIF_VALUE: 39
PIF_VALUE: 34
PIF_VALUE: 39
PIF_VALUE: 39
PIF_VALUE: 31
PIF_VALUE: 33
PIF_VALUE: 39
PIF_VALUE: 33
PIF_VALUE: 39
PIF_VALUE: 37
PIF_VALUE: 33
PIF_VALUE: 33
PIF_VALUE: 39
PIF_VALUE: 34
PIF_VALUE: 37
PIF_VALUE: 39
PIF_VALUE: 31
PIF_VALUE: 39
PIF_VALUE: 33
PIF_VALUE: 33
PIF_VALUE: 38
PIF_VALUE: 31
PIF_VALUE: 39
PIF_VALUE: 37
PIF_VALUE: 38
PIF_VALUE: 39
PIF_VALUE: 39

## 2021-08-23 ASSESSMENT — PAIN SCALES - GENERAL
PAINLEVEL_OUTOF10: 0

## 2021-08-23 NOTE — PROGRESS NOTES
08/22/21 2002   Vent Information   Vent Type 980   Vent Mode AC/VC+   Vt Ordered 450 mL   Rate Set 20 bmp   Pressure Support 0 cmH20   FiO2  60 %   SpO2 99 %   SpO2/FiO2 ratio 165   Sensitivity 3   PEEP/CPAP 15   I Time/ I Time % 0.95 s   Humidification Source HME   Vent Patient Data   Peak Inspiratory Pressure 33 cmH2O   Mean Airway Pressure 21 cmH20   Rate Measured 20 br/min   Vt Exhaled 453 mL   Minute Volume 9.13 Liters   I:E Ratio 1:2.20   Cough/Sputum   Sputum How Obtained Endotracheal   Cough None   Spontaneous Breathing Trial (SBT) RT Doc   Pulse 71   Breath Sounds   Right Upper Lobe Clear   Right Middle Lobe Diminished   Right Lower Lobe Diminished   Left Upper Lobe Clear   Left Lower Lobe Diminished   Additional Respiratory  Assessments   Resp 20   Position Prone   Alarm Settings   High Pressure Alarm 55 cmH2O   Low Minute Volume Alarm 2 L/min   High Respiratory Rate 40 br/min   Low Exhaled Vt  200 mL   ETT (adult)   Placement Date/Time: 08/17/21 1216   Tube Size: 7.5 mm  Laryngoscope: GlideScope  Blade Size: 4  Location: Oral  Placement Verified By[de-identified] Colorimetric EtCO2 device  Secured at: 23 cm  Placed By: Licensed provider   Secured at 23 cm   Measured From Lips   ET Placement Left   Secured By Commercial tube stuart   Site Condition Dry   Cuff Pressure 28 cm H2O

## 2021-08-23 NOTE — PROGRESS NOTES
08/23/21 1151   Vent Information   Vent Mode AC/VC+   Vt Ordered 500 mL   Rate Set 22 bmp   Pressure Support 0 cmH20   FiO2  100 %   Sensitivity 3   PEEP/CPAP 15   I Time/ I Time % 0.95 s   Humidification Source HME  (changed)   Vent Patient Data   Peak Inspiratory Pressure 38 cmH2O   Mean Airway Pressure 23 cmH20   Rate Measured 22 br/min   Vt Exhaled 511 mL   Minute Volume 11.2 Liters   I:E Ratio 1:1.90   Cough/Sputum   Sputum How Obtained Endotracheal;Suctioned   Cough Productive   Sputum Amount Small   Sputum Color Creamy   Tenacity Thick   Spontaneous Breathing Trial (SBT) RT Doc   Pulse 77   Additional Respiratory  Assessments   Resp 22   Position Semi-Beebe's   Alarm Settings   High Pressure Alarm 55 cmH2O   Low Minute Volume Alarm 2 L/min   High Respiratory Rate 40 br/min   Low Exhaled Vt  200 mL   Patient Observation   Observations 7.5 ETT, ambu bag @ bedside   ETT (adult)   Placement Date/Time: 08/17/21 1216   Tube Size: 7.5 mm  Laryngoscope: GlideScope  Blade Size: 4  Location: Oral  Placement Verified By[de-identified] Colorimetric EtCO2 device  Secured at: 23 cm  Placed By: Licensed provider   Secured at 23 cm   Measured From [address] [place] [address],[address] By Commercial tube stuart  (changed)

## 2021-08-23 NOTE — CARE COORDINATION
Received message from Patient Advocate, that patient's brother Champ Jennings called, requesting update on patient. We have only Joao Flores (significant other) as a . Of note, Valdemar Orellana had called RN earlier today, inquiring if he could add patient's siblings to the contact list.  This writer returned call to Evette, and also obtained names and numbers of Jena's two other siblings, and added them all to contact list. I then contacted Valdemar Orellana to discuss the situation. I explained to both Valdemar Orellana and Evette, that in the absence of Advance Directives, and according to [place], the siblings would be health care decision makers. Both seemed agreeable, and communicate well with one another. Following.       Rakel Michele RN

## 2021-08-23 NOTE — PROGRESS NOTES
PULM/CCM    Patient is right hand circulation getting compromised, patient has right brachial artery arterial line-nursing requested to discontinue that and attempt another A-line in the left hand if possible  Patient also was prone and also patient was put on pressure control ventilation    David Robins MD

## 2021-08-23 NOTE — PROGRESS NOTES
Comprehensive Nutrition Assessment    Type and Reason for Visit:  Reassess    Nutrition Recommendations/Plan:   1. Continue TF, peptide based high protein formula (VITAL) at 10 m/hr. Increase by 10 mL q 4 hrs to goal of 30 mL/hr   2. Recommend 60 mL H20 flush q 4 hours.  Monitor IVF infusion, Na labs and need for adjustments in water flush  3. Recommend 2 Bottle Proteinex P2Go daily via syringe. Flush with 30 mL H20 before and after  4. Recheck TG and monitor   5. Consider daily micronutrient supplementation: 2000 IU Vitamin D3, MVM, + Probiotic   6. Monitor TF tolerance (abd distention, bowel habits, N/V, cramping)  7. Monitor nutrition adequacy, pertinent labs, bowel habits, wt changes, and clinical progress       Nutrition Assessment:  Follow up: Pt remains intubated, sedated on precedex, versed, fentanyl at 250 mcg and propofol at 25.2 mL/hr (665 kcals) repeat TG was 2106 and d/w MDR team. Plans to wean propofol as able. TF held but plans to resume at trophic rate and titrate toward goal as tolerated after supine positioning. Malnutrition Assessment:  Malnutrition Status: At risk for malnutrition (Comment)    Context:  Acute Illness       Estimated Daily Nutrient Needs:  Energy (kcal):  0370-9267; Weight Used for Energy Requirements:  Ideal (45 kg)     Protein (g):   g; Weight Used for Protein Requirements:  Ideal (2-2.5)        Fluid (ml/day):  1 mL/kcal; Method Used for Fluid Requirements:  1 ml/kcal      Nutrition Related Findings:  TG 2106, BM 8/16 on regimen. Wounds:  None       Current Nutrition Therapies:    Diet NPO  ADULT TUBE FEEDING; Nasogastric; Peptide Based High Protein; Continuous; 10; Yes; 10; Q 6 hours; 30; 60; Q 4 hours; Protein; Administer 2 proteinex modulars daily through NGT.  Flush with 30 Ml free water  Current Tube Feeding (TF) Orders:  · Feeding Route: Nasogastric  · Formula: Peptide Based High Protein   · Goal TF & Flush Orders Provides: Vital high protein at 30 mL/hr x 20 hrs to provide 600 mL TV, 600 kcals, 53 g protein and 502 mL free water. + 2 proteinex modular for added 208 kcals and 52 g protein. + propofol calories      Anthropometric Measures:  · Height: 5' (152.4 cm)  · Current Body Weight: 185 lb (83.9 kg)   · Ideal Body Weight: 100 lbs  · BMI: 36.1  · BMI Categories: Obese Class 2 (BMI 35.0 -39.9)       Nutrition Diagnosis:   · Inadequate oral intake related to acute injury/trauma as evidenced by NPO or clear liquid status due to medical condition, intubation      Nutrition Interventions:   Food and/or Nutrient Delivery:  Continue Current Tube Feeding  Nutrition Education/Counseling:  No recommendation at this time   Coordination of Nutrition Care:  Continue to monitor while inpatient    Goals: Tolerate nutrition support at goal this admission without BG, electrolyte or GI disturbances. Nutrition Monitoring and Evaluation:   Behavioral-Environmental Outcomes:  None Identified   Food/Nutrient Intake Outcomes:  Enteral Nutrition Intake/Tolerance  Physical Signs/Symptoms Outcomes:  GI Status     Discharge Planning: Too soon to determine     Electronically signed by Mitzy Franklin.  Kandace Dakins, RD, LD on 8/23/21 at 10:46 AM EDT    Contact: [phone]

## 2021-08-23 NOTE — PROGRESS NOTES
Hospitalist Progress Note      PCP: No primary care provider on file. Date of Admission: 8/12/2021    Chief Complaint: [facility] Course: 57yoF who presented on 08/12 with n/v/diarrhea in addition to changes in mentation in the setting of recently being diagnosed with COVID-19 who was found to have sepsis physiology from progression of COVID-19. Hospi course c/b refractory hypoxemia int he setting of severe COVID-19 pneumonia. Intubated 08/17. Ongoing supination and pronation. Subjective: unable to assess d/t clinical status. 24 hours events: Code blue called for refractory hypoxemia (08/22 12/22). Pulse maintained. Now proned. On cistra, dexmedetomidine, midazolam. Prop held d/t hyper triglycerides. TF held overnight d/t concerns for aspiration. Temp 100.8 08/22/2021 at 1600.  Adequate UOP (o.5cc/kg/hr)      Medications:  Reviewed    Infusion Medications    cisatracurium (NIMBEX) infusion 1.5 mcg/kg/min (08/23/21 1008)    dexmedetomidine HCl in NaCl 0.8 mcg/kg/hr (08/23/21 1008)    midazolam 10 mg/hr (08/23/21 0915)    dextrose      fentaNYL (SUBLIMAZE) infusion 250 mcg/hr (08/23/21 0915)    norepinephrine Stopped (08/21/21 2224)    sodium chloride       Scheduled Medications    sennosides-docusate sodium  2 tablet Oral Daily    insulin lispro  0-6 Units Subcutaneous Q4H    pantoprazole  40 mg Intravenous Daily    carboxymethylcellulose PF  1 drop Both Eyes Q4H    chlorhexidine  15 mL Mouth/Throat BID    montelukast  10 mg Oral Nightly    PARoxetine  40 mg Oral Daily    sodium chloride flush  5-40 mL Intravenous 2 times per day    Vitamin D  2,000 Units Oral Daily    enoxaparin  30 mg Subcutaneous BID     PRN Meds: midazolam, glucose, dextrose, glucagon (rDNA), dextrose, potassium chloride, guaiFENesin-dextromethorphan, benzocaine-menthol, zolpidem, sodium chloride flush, sodium chloride, ondansetron **OR** ondansetron, polyethylene glycol, acetaminophen **OR** acetaminophen      Intake/Output Summary (Last 24 hours) at 8/23/2021 1143  Last data filed at 8/23/2021 0800  Gross per 24 hour   Intake 1246.3 ml   Output 1375 ml   Net -128.7 ml       Physical Exam Performed:    BP (!) 187/74   Pulse 76   Temp 99.6 °F (37.6 °C) (Bladder)   Resp 20   Ht 5' (1.524 m)   Wt 185 lb 13.6 oz (84.3 kg)   LMP 04/15/2012   SpO2 94%   BMI 36.30 kg/m²     General appearance: pronated, appears comfortable though is medically paralyzed  Neck: intubated  Respiratory:  Normal respiratory effort. Clear to auscultation, bilaterally without Rales/Wheezes/Rhonchi. (posterior exam only d/t clinical status)  Cardiovascular: Regular rate and rhythm with normal S1/S2 without murmurs, rubs or gallops. Abdomen: Soft, non-tender, non-distended with normal bowel sounds. Musculoskeletal: No clubbing, cyanosis or edema bilaterally. Full range of motion without deformity. Skin: Skin color, texture, turgor normal.  No rashes or lesions. Left scapular tattoo  Neurologic:  Paralyzed and sedated, RASS-4  Peripheral Pulses: +2 palpable, equal bilaterally       Labs:   Recent Labs     08/21/21  0520 08/23/21  0530   WBC 6.5 6.7   HGB 10.5* 10.0*   HCT 30.7* 29.4*    176     Recent Labs     08/21/21  0520 08/22/21  0530 08/23/21  0530    134* 134*   K 4.8 5.0 4.6    101 101   CO2 23 26 23   BUN 36* 39* 48*   CREATININE 0.9 0.8 0.7   CALCIUM 7.9* 7.9* 8.1*   PHOS 3.1 4.0 3.9     No results for input(s): AST, ALT, BILIDIR, BILITOT, ALKPHOS in the last 72 hours. No results for input(s): INR in the last 72 hours. No results for input(s): Margette Dec in the last 72 hours.     Urinalysis:      Lab Results   Component Value Date    NITRU Negative 08/12/2021    WBCUA 10-20 08/12/2021    BACTERIA 1+ 08/12/2021    RBCUA 3-4 08/12/2021    BLOODU TRACE-INTACT 08/12/2021    SPECGRAV >=1.030 08/12/2021    GLUCOSEU Negative 08/12/2021       Radiology:  XR CHEST PORTABLE   Final Result   No change in the tubes and catheters. Stable cardiomegaly with worsening pulmonary edema. Increasing airspace opacity throughout both lungs which could be due to   pulmonary edema and/or worsening pneumonia      Questionable small bibasilar pleural effusions which are more prominent         XR ABDOMEN FOR NG/OG/NE TUBE PLACEMENT   Final Result   Status post placement of nasogastric tube with distal tip located in the   region the stomach as described above. XR CHEST PORTABLE   Final Result   1. Status post placement of endotracheal tube with distal tip located   approximately 3.5 cm above the elsi. 2. Status post placement of right internal jugular central line with distal   tip located near junction of superior vena cava and right atrium. No   evidence of pneumothorax. XR CHEST PORTABLE   Final Result   Diffuse bilateral airspace disease. Aeration is improved compared to prior   study. XR CHEST PORTABLE   Final Result   Significant worsening in appearance of bilateral pneumonia         CT Head WO Contrast   Final Result   No acute intracranial abnormality. CT ABDOMEN PELVIS W IV CONTRAST Additional Contrast? None   Final Result   1. Mucosal thickening and enhancement of the terminal ileum, proximal colon   and sigmoid colon/rectum. Pattern is nonspecific and suggests underlying   enterocolitis. Infectious or inflammatory etiologies may be considered. 2. No pattern of bowel obstruction. 3. Hepatic steatosis. 4. Airspace changes in the lower chest likely represent underlying viral   pneumonia. XR CHEST PORTABLE   Final Result   Perihilar opacities could represent COVID pneumonia given indication. Correlate with presentation to exclude superimposed congestive heart failure.                  Assessment/Plan:    Active Hospital Problems    Diagnosis     Acute respiratory failure with hypoxia (HCC) [J96.01]     Altered mental status [R41.82]     Mild intermittent asthma without complication [K95.11]     CINDY on CPAP [G47.33, Z99.89]     Elevated transaminase level [R74.01]     Hepatic steatosis [K76.0]     Obesity (BMI 35.0-39.9 without comorbidity) [E66.9]     2019 novel coronavirusinfected pneumonia (NCIP) [U07.1, J12.82]      60yoF who presented on 08/12 with n/v/diarrhea in addition to changes in mentation in the setting of recently being diagnosed with COVID-19 who was found to have sepsis physiology from progression of COVID-19. Hospital course c/b refractory hypoxemia in the setting of severe COVID-19 pneumonia on steroid, IL6 inhibitor. . Intubated 08/17. Ongoing supination and pronation. Severe Acute, COVID-19 Pneumonia- p/w GI sxs and dehydration. Ongoing challenges with oxygenation. - appreciate pulm reccs  - appreciate ID reccs  - s/p solumedrol 40mg BID (08/13-08/22)  - s/p tociluzimab (08/16)    Acute hypoxic respiratory failure ARDS- likely from COVID-19. PaO2/FiO2: 125. Positive 32 cc. Previous echo 2013 w/ nrl EF.  - respiratory cultures- 3+ Gram positive cocci (in light of ongoing low grade fevers and worsening hypoxemia may warrant tx, will d/w pulm)  - PRN IV diuresis per Pulm     N/v/abd pain/diarrhea- likely from covid, CTa/p done(suggestive of enterocolitis, no obstruction, noted hepatic steatosis)  -continue supportive care  - cdiff negative  -ua was abn and ucx > 50k mixed mike, no UTI at this time     Asthma- on home singulair     Anxiety-continued paxil       DVT Prophylaxis: enoxaparin BID  GI ppx: IV PPI  Diet: Diet NPO  ADULT TUBE FEEDING; Nasogastric; Peptide Based High Protein; Continuous; 10; Yes; 10; Q 6 hours; 30; 60; Q 4 hours; Protein; Administer 2 proteinex modulars daily through NGT.  Flush with 30 Ml free water  Code Status: Full Code    PT/OT Eval Status: pending clinical course    Dispo - pending clinical course    Rocio Farooq MD

## 2021-08-23 NOTE — CARE COORDINATION
CASE MANAGEMENT INITIAL ASSESSMENT      Reviewed chart and completed assessment via telephone with SO Keya Oquendo and brother Kiana Cook. Patient is sedated on Vent. Explained Case Management role/services. Primary contact information:Rahat Gloria Vasquez () [phone]  Health Care Decision Maker : NOK are 3 siblings:  Kiana Cook [phone]  Jersey Stoner [phone]  Daisy Young [phone]  Patient has no AD's in place. Explained to Caity as well as Linda Silva, that if significant health care decisions need to be made, the siblings, as NOK, would be contacted. They both verbalized their understanding of this situation. Admit date/status:8/16/21 IPA    Diagnosis:Covid pneumonia    Is this a Readmission?:  No      Insurance none   Precert required for SNF: Yes       3 night stay required: No    Living arrangements, Adls, care needs, prior to admission Lives in two story [facility] with her [address] IPTA    Transportation:self    Durable Medical Equipment at home:  Walker__Cane__RTS__ BSC__Shower Chair__  02__ HHN__ CPAP__  BiPap__  Hospital Bed__ W/C___ Other__none________    Services in the home and/or outpatient, prior to [address] (if applicable)   · Name:  · Address:  · Dialysis Schedule:  · Phone:  · Fax:    PT/OT recs:not yet ordered    Hospital Exemption Notification (HEN):needed if SNF    Barriers to discharge:multiple. Patient has no insurance and is likely to need SNF or LTACH if she survives.     Plan/comments:undetermined    ECOC on chart for MD signature    Merlene Gillette RN

## 2021-08-23 NOTE — PROGRESS NOTES
Pulmonary & Critical Care Medicine ICU Progress Note      Reason for visit: Acute respiratory failure, severe COVID-19 pneumonia. Past history of asthma, anxiety, iron deficiency anemia. Events of Last 24 hours: Patient continues to be critically ill on mechanical vent support when seen this morning, patientwhen seen was on 70% oxygen and 15 of PEEP on prone position maintain saturation, patient does not have any increasing respiratory secretions on the endotracheal tube patient has a T-max of 101°F, patient's rhythm on the monitor was sinus, patient's blood sugars are better controlled , patient continues to be on multiple medications including fentanyl propofol Versed to maintain patient ventilator synchrony along with that patient continues to be  on neuromuscular blockade ; patient has had adequate urine output overnight with cumulative fluid balance of +2.3 L, patient has been on NG tube feeds, no other pertinent review of system could be obtained      Invasive Lines:   CVC RIJ 8/17  Art Line 8/17        MV: 8/17/2021      MV Settings:  Vent Mode: AC/VC+ Rate Set: 22 bmp/Vt Ordered: 500 mL/ [email or web address])    IV:   vasopressin (Septic Shock) infusion 0.03 Units/min (08/23/21 1639)    phenylephrine (OSVALDO-SYNEPHRINE) 50mg/250mL infusion Stopped (08/23/21 1702)    sodium chloride      cisatracurium (NIMBEX) infusion Stopped (08/23/21 1703)    dexmedetomidine HCl in NaCl Stopped (08/23/21 1703)    midazolam Stopped (08/23/21 1703)    dextrose      fentaNYL (SUBLIMAZE) infusion 200 mcg/hr (08/23/21 1230)    norepinephrine 5 mcg/min (08/23/21 1200)    sodium chloride         Vitals:  BP (!) 187/74   Pulse 101   Temp 101.8 °F (38.8 °C) (Bladder)   Resp 22   Ht 5' (1.524 m)   Wt 185 lb 13.6 oz (84.3 kg)   LMP 04/15/2012   SpO2 (!) 87%   BMI 36.30 kg/m²          In-person bedside physical examination deferred.   Pursuant to the emergency declaration under the [address] 08/21/21  0520 08/22/21  0530 08/23/21  0530    134* 134*   K 4.8 5.0 4.6    101 101   CO2 23 26 23   PHOS 3.1 4.0 3.9   BUN 36* 39* 48*   CREATININE 0.9 0.8 0.7     Results for Ameya Dover (MRN [id number]) as of 8/23/2021 16:34   Ref. Range 8/23/2021 05:30 8/23/2021 06:15 8/23/2021 09:11 8/23/2021 12:22 8/23/2021 15:45   Sodium Latest Ref Range: 136 - 145 mmol/L 134 (L)       Potassium Latest Ref Range: 3.5 - 5.1 mmol/L 4.6       Chloride Latest Ref Range: 99 - 110 mmol/L 101       CO2 Latest Ref Range: 21 - 32 mmol/L 23       BUN Latest Ref Range: 7 - 20 mg/dL 48 (H)       Creatinine Latest Ref Range: 0.6 - 1.2 mg/dL 0.7       Anion Gap Latest Ref Range: 3 - 16  10       GFR Non- Latest Ref Range: >60  >60       GFR  Latest Ref Range: >60  >60       Glucose Latest Ref Range: 70 - 99 mg/dL 177 (H)       POC Glucose Latest Ref Range: 70 - 99 mg/dl  171 (H) 117 (H) 113 (H) 121 (H)   Calcium Latest Ref Range: 8.3 - 10.6 mg/dL 8.1 (L)       Phosphorus Latest Ref Range: 2.5 - 4.9 mg/dL 3.9       Procalcitonin Latest Ref Range: 0.00 - 0.15 ng/mL 0.27 (H)         Results for Ameya Dover (MRN [id number]) as of 8/23/2021 16:34   Ref. Range 8/17/2021 15:19 8/23/2021 05:30   CRP Latest Ref Range: 0.0 - 5.1 mg/L 34.8 (H) 31.0 (H)     Results for Ameya Dover (MRN [id number]) as of 8/23/2021 16:34   Ref. Range 8/14/2021 05:04 8/17/2021 15:19 8/23/2021 05:30   Ferritin Latest Ref Range: 15.0 - 150.0 ng/mL 687.5 (H) 912.8 (H) 349.7 (H)     Gram stain Anaerobic bottle:   Gram positive cocci in clusters   resembling Staphylococcus   Information to follow    Organism Abnormal  08/22/2021 12:00 AM [facility]   Staph aureus DNA Detected    Blood Culture, Routine 08/22/2021 12:00 AM [address] [facility]   mecA gene not detected   See additional report for complete BCID panel.     Testing Performed By    Krissy Higuera Name Director Address Valid Date Range   19-[facility] Gertrude Ricks M.D. [address] [place] [address] Drive. [facility] [address] 09/26/20 0000-Present   Narrative  Performed by: [facility]  ORDER#: [id number]                          ORDERED BY: LOUIS LEDESMA   SOURCE: Blood Line, Central            Assessment and plan:  Acute respiratory failure, hypoxemic. Ventilatory support to keep saturation being 90-94% only  Patient had to be performed last night because of worsening hypoxemia  Ventilator settings and waveforms reviewed  Ventilator changes made and discussed with the respiratory therapist  IV sedation to maintain patient ventilator synchrony  IV neuromuscular blockade along with IV sedation to continue for now   Nursing was requested to see if patient's Precedex infusion can be discontinued  Titration of sedation as per RASS score/BIS  Pulmonary toilet  Monitor input output and BMP  Correct electrolytes on whenever necessary basis  We will trend the inflammatory markers  Acute COVID-19 pneumonia. Presented with GI symptoms and confusion, those had resolved but her respiratory status has worsened. ID following, received Actemra. On IV steroids  Staph aureus bacteremia-patient was started on IV Zosyn and patient was also given 1 dose of IV vancomycin-will reassess as per clinical status and cultures  Abnormal LFT. Did have steatosis on CT, could be related to COVID-19 infection as well. Slightly better on repeat LFT. Hyperglycemia. May need to be monitored and placed on SSI, better controlled  CINDY. On CPAP at home. Details not available. Address when she improves  Bronchial asthma. Mild intermittent as per the patient. Presently no wheezing. On Singulair, added as needed bronchodilator  Obesity. Address when she improves. Elevated d-dimers.   Lovenox Increased to 1 mg/kg sc Q12H            Electronically signed by:  Julia Pino MD    8/23/2021    5:05 PM.       Addendum-patient was supined after which patient was more hypoxemic and patient's oxygen requirements went up and patient was put on 100% oxygen with PEEP of 15 to maintain saturation, subsequently patient also became more hypo tensive and patient was started on IV Levophed; patient decompensated again earlier and patient became more hypoxemic along with hypertensive; patient had to be bagged along with that patient was started on IV fluid boluses, patient was also started on IV vasopressin infusion along with Levophed and also Shad-Synephrine infusion has been ordered  Patient's IV sedation was transiently stopped when the patient was having hypotension and hypoxemia  Patient's IV steroids were changed to IV hydrocortisone 100 mg every 8 hours  Patient was started on IV antibiotics in the form of Zosyn and vancomycin  Patient was started on IV fluids in addition to the IV fluid bolus which was ordered for 30 mg/kg body weight  Lactic acid levels ordered  Patient appears to have developed staph bacteremia along with sepsis and septic shock in addition to the acute respiratory failure secondary to COVID-19 infection  Patient's clinical status has deteriorated further and needs to monitor closely in the ICU  There is a high probability that patient may not survive these insults and needs to be aggressively monitored and managed    Patient was seen and evaluated and managed multiple times in the course of the day      Critical care time spent reviewing labs/films, examining patient, collaborating with other physicians but excluding procedures for life threatening organ failure is 85 minutes . Multidisciplinary rounds were completed at the bedside with participation from the intensivist, pharmacy, nursing, nutrition.   All labs and imaging studies reviewed, discussed      David Robins MD

## 2021-08-23 NOTE — PROGRESS NOTES
This note also relates to the following rows which could not be included:  Vt Ordered - Cannot attach notes to unvalidated device data  Rate Set - Cannot attach notes to unvalidated device data  Pressure Support - Cannot attach notes to unvalidated device data  FiO2  - Cannot attach notes to unvalidated device data  SpO2 - Cannot attach notes to unvalidated device data  Sensitivity - Cannot attach notes to unvalidated device data  PEEP/CPAP - Cannot attach notes to unvalidated device data  I Time/ I Time % - Cannot attach notes to unvalidated device data  Peak Inspiratory Pressure - Cannot attach notes to unvalidated device data  Mean Airway Pressure - Cannot attach notes to unvalidated device data  Rate Measured - Cannot attach notes to unvalidated device data  Vt Exhaled - Cannot attach notes to unvalidated device data  Minute Volume - Cannot attach notes to unvalidated device data  I:E Ratio - Cannot attach notes to unvalidated device data  Pulse - Cannot attach notes to unvalidated device data  Resp - Cannot attach notes to unvalidated device data  High Pressure Alarm - Cannot attach notes to unvalidated device data  Low Minute Volume Alarm - Cannot attach notes to unvalidated device data  High Respiratory Rate - Cannot attach notes to unvalidated device data       08/23/21 1628   Vent Information   Vent Type 980   Vent Mode AC/VC+   Humidification Source HME   Patient Observation   Observations 7.5 ETT, ambu bag @ bedside   ETT (adult)   Placement Date/Time: 08/17/21 1216   Tube Size: 7.5 mm  Laryngoscope: GlideScope  Blade Size: 4  Location: Oral  Placement Verified By[de-identified] Colorimetric EtCO2 device  Secured at: 23 cm  Placed By: Licensed provider   Secured at 23 cm   Measured From [address] [place] [address],[address] By Commercial tube stuart   Site Condition Dry   Cuff Pressure 30 cm H2O

## 2021-08-23 NOTE — PROGRESS NOTES
08/22/21 2350   Vent Information   Vent Type 980   Vent Mode AC/VC+   Vt Ordered 450 mL   Rate Set 20 bmp   Pressure Support 0 cmH20   FiO2  60 %   SpO2 94 %   SpO2/FiO2 ratio 156.67   Sensitivity 3   PEEP/CPAP 15   I Time/ I Time % 0.95 s   Humidification Source HME   Vent Patient Data   Peak Inspiratory Pressure 32 cmH2O   Mean Airway Pressure 21 cmH20   Rate Measured 20 br/min   Vt Exhaled 449 mL   Minute Volume 8.98 Liters   I:E Ratio 1:2.20   Cough/Sputum   Sputum How Obtained Endotracheal   Cough Productive   Sputum Amount Small   Sputum Color Creamy   Tenacity Thick   Spontaneous Breathing Trial (SBT) RT Doc   Pulse 71   Breath Sounds   Right Upper Lobe Clear   Right Middle Lobe Diminished   Right Lower Lobe Diminished   Left Upper Lobe Clear   Left Lower Lobe Diminished   Additional Respiratory  Assessments   Resp 20   Position Prone   Alarm Settings   High Pressure Alarm 55 cmH2O   Low Minute Volume Alarm 2 L/min   High Respiratory Rate 40 br/min   Low Exhaled Vt  200 mL   ETT (adult)   Placement Date/Time: 08/17/21 1216   Tube Size: 7.5 mm  Laryngoscope: GlideScope  Blade Size: 4  Location: Oral  Placement Verified By[de-identified] Colorimetric EtCO2 device  Secured at: 23 cm  Placed By: Licensed provider   Secured at 23 cm   Measured From Lips   ET Placement Left   Secured By Commercial tube stuart   Site Condition Dry   Cuff Pressure 30 cm H2O

## 2021-08-23 NOTE — PROGRESS NOTES
08/23/21 1914   Vent Information   Vent Type 980   Vent Mode AC/PC   Pressure Ordered 23   Rate Set 22 bmp   Pressure Support 0 cmH20   FiO2  100 %   SpO2 (!) 82 %   SpO2/FiO2 ratio 82   Sensitivity 3   PEEP/CPAP 15   I Time/ I Time % 0 s   Vent Patient Data   High Peep/I Pressure 23   Peak Inspiratory Pressure 39 cmH2O   Mean Airway Pressure 23 cmH20   Rate Measured 22 br/min   Vt Exhaled 681 mL   Minute Volume 15 Liters   I:E Ratio 1:2.00   Spontaneous Breathing Trial (SBT) RT Doc   Pulse 110   Additional Respiratory  Assessments   Resp 22   Alarm Settings   High Pressure Alarm 55 cmH2O   Low Minute Volume Alarm 2 L/min   High Respiratory Rate 40 br/min   Patient Observation   Observations 7.5 ETT, ambu bag @ bedside   ETT (adult)   Placement Date/Time: 08/17/21 1216   Tube Size: 7.5 mm  Laryngoscope: GlideScope  Blade Size: 4  Location: Oral  Placement Verified By[de-identified] Colorimetric EtCO2 device  Secured at: 23 cm  Placed By: Licensed provider   Secured at 23 cm   Measured From Lips   ET Placement Left   Secured By Commercial tube stuart  (patient proned)   Site Condition Dry   Cuff Pressure 30 cm H2O

## 2021-08-23 NOTE — PLAN OF CARE
Problem: Nutrition  Goal: Optimal nutrition therapy  Outcome: Ongoing  Note: Nutrition Problem #1: Inadequate oral intake  Intervention: Food and/or Nutrient Delivery: Continue Current Tube Feeding  Nutritional Goals: Tolerate nutrition support at goal this admission without BG, electrolyte or GI disturbances.

## 2021-08-23 NOTE — PROGRESS NOTES
Patient remains intubated, sedated, proned, and paralyzed. No significant events occurred overnight. Safety precautions in place.  Will continue to [facility]

## 2021-08-23 NOTE — PROCEDURES
Bronchoscopy note    ASA 5, intubated patient    Patient with acute respiratory failure/ARDS/COVID-19 pneumonia/strep bacteremia/septic shock-continues to decompensate and patient has large amounts of copious amounts of respiratory secretions which are coming out now which is impeding patient's oxygenation and for that reason on emergent basis the respiratory therapist was requested to come to the bedside with a AmbU scope, patient was not requiring any sedation for the procedure, the endoscopy was introduced through the endotracheal tube using a T-piece adapter, patient had copious amounts of thick mucous plugs in the entire tracheobronchial tree along with that patient had significant erythema and inflammation of the tracheobronchial epithelial lining and the entire tracheobronchial tree was therapeutically aspirated out using Mucomyst and saline  Estimated blood loss of 0  Nursing was requested to put the patient back in a prone position    Patient's family to be updated about patient's clinical status which is deteriorating    NEGRITO MORRISON MD

## 2021-08-24 PROBLEM — E78.1 HYPERTRIGLYCERIDEMIA: Status: ACTIVE | Noted: 2021-08-24

## 2021-08-24 PROBLEM — N17.9 AKI (ACUTE KIDNEY INJURY) (HCC): Status: ACTIVE | Noted: 2021-08-24

## 2021-08-24 PROBLEM — A49.1 GROUP B STREPTOCOCCAL INFECTION: Status: ACTIVE | Noted: 2021-08-24

## 2021-08-24 PROBLEM — E87.20 LACTIC ACIDOSIS: Status: ACTIVE | Noted: 2021-08-24

## 2021-08-24 LAB
ALBUMIN SERPL-MCNC: 2.5 G/DL (ref 3.4–5)
ANION GAP SERPL CALCULATED.3IONS-SCNC: 16 MMOL/L (ref 3–16)
BASE EXCESS VENOUS: -11 (ref -3–3)
BASE EXCESS VENOUS: -5 (ref -3–3)
BASE EXCESS VENOUS: -5.3 MMOL/L (ref -3–3)
BUN BLDV-MCNC: 66 MG/DL (ref 7–20)
CALCIUM SERPL-MCNC: 7 MG/DL (ref 8.3–10.6)
CARBOXYHEMOGLOBIN: 2.3 % (ref 0–1.5)
CHLORIDE BLD-SCNC: 94 MMOL/L (ref 99–110)
CO2: 22 MMOL/L (ref 21–32)
CREAT SERPL-MCNC: 2.1 MG/DL (ref 0.6–1.2)
GFR AFRICAN AMERICAN: 29
GFR NON-AFRICAN AMERICAN: 24
GLUCOSE BLD-MCNC: 141 MG/DL (ref 70–99)
GLUCOSE BLD-MCNC: 200 MG/DL (ref 70–99)
GLUCOSE BLD-MCNC: 222 MG/DL (ref 70–99)
GLUCOSE BLD-MCNC: 224 MG/DL (ref 70–99)
GLUCOSE BLD-MCNC: 238 MG/DL (ref 70–99)
GLUCOSE BLD-MCNC: 293 MG/DL (ref 70–99)
GLUCOSE BLD-MCNC: 318 MG/DL (ref 70–99)
HCO3 VENOUS: 21.2 MMOL/L (ref 23–29)
HCO3 VENOUS: 22.1 MMOL/L (ref 23–29)
HCO3 VENOUS: 25.7 MMOL/L (ref 23–29)
LACTIC ACID: 5.3 MMOL/L (ref 0.4–2)
METHEMOGLOBIN VENOUS: 0.6 %
O2 SAT, VEN: 74 %
O2 SAT, VEN: 76 %
O2 SAT, VEN: 99 %
O2 THERAPY: ABNORMAL
PCO2, VEN: 106.6 MM HG (ref 40–50)
PCO2, VEN: 45.1 MMHG (ref 40–50)
PCO2, VEN: 98.3 MM HG (ref 40–50)
PERFORMED ON: ABNORMAL
PH VENOUS: 6.92 (ref 7.35–7.45)
PH VENOUS: 7.03 (ref 7.35–7.45)
PH VENOUS: 7.29 (ref 7.35–7.45)
PHOSPHORUS: 7.3 MG/DL (ref 2.5–4.9)
PO2, VEN: 205.5 MMHG (ref 25–40)
PO2, VEN: 61 MM HG
PO2, VEN: 66 MM HG
POC SAMPLE TYPE: ABNORMAL
POC SAMPLE TYPE: ABNORMAL
POTASSIUM SERPL-SCNC: 4.6 MMOL/L (ref 3.5–5.1)
SODIUM BLD-SCNC: 132 MMOL/L (ref 136–145)
TCO2 CALC VENOUS: 23 MMOL/L
TCO2 CALC VENOUS: 25 MMOL/L
TCO2 CALC VENOUS: 29 MMOL/L

## 2021-08-24 PROCEDURE — 6360000002 HC RX W HCPCS: Performed by: NURSE PRACTITIONER

## 2021-08-24 PROCEDURE — 6370000000 HC RX 637 (ALT 250 FOR IP): Performed by: INTERNAL MEDICINE

## 2021-08-24 PROCEDURE — 82803 BLOOD GASES ANY COMBINATION: CPT

## 2021-08-24 PROCEDURE — 99291 CRITICAL CARE FIRST HOUR: CPT | Performed by: INTERNAL MEDICINE

## 2021-08-24 PROCEDURE — 6360000002 HC RX W HCPCS: Performed by: INTERNAL MEDICINE

## 2021-08-24 PROCEDURE — 99292 CRITICAL CARE ADDL 30 MIN: CPT | Performed by: INTERNAL MEDICINE

## 2021-08-24 PROCEDURE — 2580000003 HC RX 258: Performed by: INTERNAL MEDICINE

## 2021-08-24 PROCEDURE — 6360000002 HC RX W HCPCS: Performed by: STUDENT IN AN ORGANIZED HEALTH CARE EDUCATION/TRAINING PROGRAM

## 2021-08-24 PROCEDURE — 2580000003 HC RX 258: Performed by: NURSE PRACTITIONER

## 2021-08-24 PROCEDURE — 2500000003 HC RX 250 WO HCPCS: Performed by: NURSE PRACTITIONER

## 2021-08-24 PROCEDURE — 2500000003 HC RX 250 WO HCPCS: Performed by: INTERNAL MEDICINE

## 2021-08-24 PROCEDURE — 83605 ASSAY OF LACTIC ACID: CPT

## 2021-08-24 PROCEDURE — 94003 VENT MGMT INPAT SUBQ DAY: CPT

## 2021-08-24 PROCEDURE — 2700000000 HC OXYGEN THERAPY PER DAY

## 2021-08-24 PROCEDURE — 2000000000 HC ICU R&B

## 2021-08-24 PROCEDURE — 80069 RENAL FUNCTION PANEL: CPT

## 2021-08-24 PROCEDURE — C9113 INJ PANTOPRAZOLE SODIUM, VIA: HCPCS | Performed by: NURSE PRACTITIONER

## 2021-08-24 PROCEDURE — 6370000000 HC RX 637 (ALT 250 FOR IP): Performed by: NURSE PRACTITIONER

## 2021-08-24 PROCEDURE — 94761 N-INVAS EAR/PLS OXIMETRY MLT: CPT

## 2021-08-24 PROCEDURE — 82947 ASSAY GLUCOSE BLOOD QUANT: CPT

## 2021-08-24 RX ORDER — SODIUM CHLORIDE, SODIUM GLUCONATE, SODIUM ACETATE, POTASSIUM CHLORIDE AND MAGNESIUM CHLORIDE 526; 502; 368; 37; 30 MG/100ML; MG/100ML; MG/100ML; MG/100ML; MG/100ML
INJECTION, SOLUTION INTRAVENOUS CONTINUOUS
Status: DISCONTINUED | OUTPATIENT
Start: 2021-08-24 | End: 2021-08-25

## 2021-08-24 RX ORDER — SENNA PLUS 8.6 MG/1
2 TABLET ORAL 2 TIMES DAILY
Status: DISCONTINUED | OUTPATIENT
Start: 2021-08-24 | End: 2021-08-25

## 2021-08-24 RX ADMIN — Medication 15 ML: at 09:40

## 2021-08-24 RX ADMIN — MIDAZOLAM 10 MG/HR: 5 INJECTION INTRAMUSCULAR; INTRAVENOUS at 06:07

## 2021-08-24 RX ADMIN — FENTANYL CITRATE 250 MCG/HR: 0.05 INJECTION, SOLUTION INTRAMUSCULAR; INTRAVENOUS at 06:38

## 2021-08-24 RX ADMIN — DEXTROSE MONOHYDRATE 30 MCG/MIN: 50 INJECTION, SOLUTION INTRAVENOUS at 01:09

## 2021-08-24 RX ADMIN — PIPERACILLIN AND TAZOBACTAM 3375 MG: 3; .375 INJECTION, POWDER, LYOPHILIZED, FOR SOLUTION INTRAVENOUS at 02:25

## 2021-08-24 RX ADMIN — DOCUSATE SODIUM 100 MG: 50 LIQUID ORAL at 21:31

## 2021-08-24 RX ADMIN — FENTANYL CITRATE 250 MCG/HR: 0.05 INJECTION, SOLUTION INTRAMUSCULAR; INTRAVENOUS at 02:25

## 2021-08-24 RX ADMIN — CARBOXYMETHYLCELLULOSE SODIUM 1 DROP: 10 GEL OPHTHALMIC at 04:44

## 2021-08-24 RX ADMIN — SODIUM CHLORIDE, PRESERVATIVE FREE 10 ML: 5 INJECTION INTRAVENOUS at 09:38

## 2021-08-24 RX ADMIN — SENNOSIDES 17.2 MG: 8.6 TABLET, FILM COATED ORAL at 21:30

## 2021-08-24 RX ADMIN — FENTANYL CITRATE 250 MCG/HR: 0.05 INJECTION, SOLUTION INTRAMUSCULAR; INTRAVENOUS at 17:06

## 2021-08-24 RX ADMIN — PANTOPRAZOLE SODIUM 40 MG: 40 INJECTION, POWDER, FOR SOLUTION INTRAVENOUS at 09:38

## 2021-08-24 RX ADMIN — Medication 0.8 MCG/KG/HR: at 12:40

## 2021-08-24 RX ADMIN — Medication 2000 UNITS: at 09:39

## 2021-08-24 RX ADMIN — SODIUM CHLORIDE, PRESERVATIVE FREE 10 ML: 5 INJECTION INTRAVENOUS at 20:56

## 2021-08-24 RX ADMIN — SODIUM BICARBONATE: 84 INJECTION, SOLUTION INTRAVENOUS at 06:39

## 2021-08-24 RX ADMIN — Medication 0.8 MCG/KG/HR: at 06:46

## 2021-08-24 RX ADMIN — CARBOXYMETHYLCELLULOSE SODIUM 1 DROP: 10 GEL OPHTHALMIC at 00:54

## 2021-08-24 RX ADMIN — DEXTROSE MONOHYDRATE 30 MCG/MIN: 50 INJECTION, SOLUTION INTRAVENOUS at 20:30

## 2021-08-24 RX ADMIN — INSULIN LISPRO 2 UNITS: 100 INJECTION, SOLUTION INTRAVENOUS; SUBCUTANEOUS at 00:54

## 2021-08-24 RX ADMIN — INSULIN LISPRO 4 UNITS: 100 INJECTION, SOLUTION INTRAVENOUS; SUBCUTANEOUS at 06:08

## 2021-08-24 RX ADMIN — Medication 15 ML: at 20:56

## 2021-08-24 RX ADMIN — HYDROCORTISONE SODIUM SUCCINATE 100 MG: 100 INJECTION, POWDER, FOR SOLUTION INTRAMUSCULAR; INTRAVENOUS at 02:25

## 2021-08-24 RX ADMIN — DOCUSATE SODIUM 100 MG: 50 LIQUID ORAL at 09:39

## 2021-08-24 RX ADMIN — PIPERACILLIN AND TAZOBACTAM 3375 MG: 3; .375 INJECTION, POWDER, LYOPHILIZED, FOR SOLUTION INTRAVENOUS at 16:50

## 2021-08-24 RX ADMIN — HYDROCORTISONE SODIUM SUCCINATE 100 MG: 100 INJECTION, POWDER, FOR SOLUTION INTRAMUSCULAR; INTRAVENOUS at 09:39

## 2021-08-24 RX ADMIN — ENOXAPARIN SODIUM 80 MG: 80 INJECTION SUBCUTANEOUS at 21:31

## 2021-08-24 RX ADMIN — SENNOSIDES 17.2 MG: 8.6 TABLET, FILM COATED ORAL at 09:39

## 2021-08-24 RX ADMIN — PIPERACILLIN AND TAZOBACTAM 3375 MG: 3; .375 INJECTION, POWDER, LYOPHILIZED, FOR SOLUTION INTRAVENOUS at 09:35

## 2021-08-24 RX ADMIN — CARBOXYMETHYLCELLULOSE SODIUM 1 DROP: 10 GEL OPHTHALMIC at 16:25

## 2021-08-24 RX ADMIN — PAROXETINE HYDROCHLORIDE 40 MG: 20 TABLET, FILM COATED ORAL at 09:40

## 2021-08-24 RX ADMIN — CISATRACURIUM BESYLATE 1.5 MCG/KG/MIN: 10 INJECTION, SOLUTION INTRAVENOUS at 13:00

## 2021-08-24 RX ADMIN — INSULIN LISPRO 2 UNITS: 100 INJECTION, SOLUTION INTRAVENOUS; SUBCUTANEOUS at 09:42

## 2021-08-24 RX ADMIN — CARBOXYMETHYLCELLULOSE SODIUM 1 DROP: 10 GEL OPHTHALMIC at 09:38

## 2021-08-24 RX ADMIN — CARBOXYMETHYLCELLULOSE SODIUM 1 DROP: 10 GEL OPHTHALMIC at 11:43

## 2021-08-24 RX ADMIN — FENTANYL CITRATE 250 MCG/HR: 0.05 INJECTION, SOLUTION INTRAMUSCULAR; INTRAVENOUS at 11:19

## 2021-08-24 RX ADMIN — INSULIN LISPRO 6 UNITS: 100 INJECTION, SOLUTION INTRAVENOUS; SUBCUTANEOUS at 16:53

## 2021-08-24 RX ADMIN — DEXTROSE MONOHYDRATE 30 MCG/MIN: 50 INJECTION, SOLUTION INTRAVENOUS at 11:19

## 2021-08-24 RX ADMIN — INSULIN LISPRO 6 UNITS: 100 INJECTION, SOLUTION INTRAVENOUS; SUBCUTANEOUS at 11:45

## 2021-08-24 RX ADMIN — Medication 0.8 MCG/KG/HR: at 00:52

## 2021-08-24 RX ADMIN — Medication 0.8 MCG/KG/HR: at 20:00

## 2021-08-24 RX ADMIN — SODIUM CHLORIDE, SODIUM GLUCONATE, SODIUM ACETATE, POTASSIUM CHLORIDE AND MAGNESIUM CHLORIDE: 526; 502; 368; 37; 30 INJECTION, SOLUTION INTRAVENOUS at 21:54

## 2021-08-24 RX ADMIN — HYDROCORTISONE SODIUM SUCCINATE 100 MG: 100 INJECTION, POWDER, FOR SOLUTION INTRAMUSCULAR; INTRAVENOUS at 16:49

## 2021-08-24 RX ADMIN — FENTANYL CITRATE 250 MCG/HR: 0.05 INJECTION, SOLUTION INTRAMUSCULAR; INTRAVENOUS at 20:57

## 2021-08-24 RX ADMIN — VASOPRESSIN 0.03 UNITS/MIN: 20 INJECTION INTRAVENOUS at 05:54

## 2021-08-24 RX ADMIN — SODIUM CHLORIDE, SODIUM GLUCONATE, SODIUM ACETATE, POTASSIUM CHLORIDE AND MAGNESIUM CHLORIDE: 526; 502; 368; 37; 30 INJECTION, SOLUTION INTRAVENOUS at 14:49

## 2021-08-24 ASSESSMENT — PULMONARY FUNCTION TESTS
PIF_VALUE: 37
PIF_VALUE: 38
PIF_VALUE: 37
PIF_VALUE: 37
PIF_VALUE: 38
PIF_VALUE: 37
PIF_VALUE: 37
PIF_VALUE: 41
PIF_VALUE: 37
PIF_VALUE: 38
PIF_VALUE: 41
PIF_VALUE: 37
PIF_VALUE: 38
PIF_VALUE: 37
PIF_VALUE: 38
PIF_VALUE: 38
PIF_VALUE: 37
PIF_VALUE: 38
PIF_VALUE: 37
PIF_VALUE: 38
PIF_VALUE: 37
PIF_VALUE: 37
PIF_VALUE: 38
PIF_VALUE: 41
PIF_VALUE: 37
PIF_VALUE: 38
PIF_VALUE: 37
PIF_VALUE: 38
PIF_VALUE: 37
PIF_VALUE: 37
PIF_VALUE: 38
PIF_VALUE: 37
PIF_VALUE: 37
PIF_VALUE: 38
PIF_VALUE: 38
PIF_VALUE: 37
PIF_VALUE: 38
PIF_VALUE: 37
PIF_VALUE: 38
PIF_VALUE: 38
PIF_VALUE: 37
PIF_VALUE: 38
PIF_VALUE: 38
PIF_VALUE: 37
PIF_VALUE: 38
PIF_VALUE: 37
PIF_VALUE: 37
PIF_VALUE: 38
PIF_VALUE: 37
PIF_VALUE: 38
PIF_VALUE: 37

## 2021-08-24 ASSESSMENT — PAIN SCALES - GENERAL
PAINLEVEL_OUTOF10: 0

## 2021-08-24 ASSESSMENT — ENCOUNTER SYMPTOMS: TACHYPNEA: 1

## 2021-08-24 NOTE — CONSULTS
Thank you to requesting provider: Dr. Kelvin Connors, for asking us to see Debbi Aiken  Reason for consultation:  Acute Kidney Injury   Chief Complaint:  Shortness of breath     History of Presenting Illness      60 y/o female admitted to the ICU with COVID19 pneumonia and respiratory failure. We have been asked to see her for acute kidney injury. Yesterday she decompensated and became hypotensive. She is now in the prone position and trending to oliguria. She was also acidotic. Discussed with family. They have changed her code status.         Past Medical/Surgical History      Active Ambulatory Problems     Diagnosis Date Noted    Anxiety and depression 08/15/2012    Chest pain 03/07/2013     Resolved Ambulatory Problems     Diagnosis Date Noted    No Resolved Ambulatory Problems     Past Medical History:   Diagnosis Date    Anxiety     Asthma     COVID-19 08/11/2021    Haemorrhoids in the puerperium, delivered, with mention of postpartum complication     Iron deficiency     Menorrhagia          Review of Systems     Unable to obtain       Medications      Reviewed in EMR     Allergies     Erythromycin and Sulphur [colloidal sulfur]      Family History       Negative for Kidney Disease    Social History      Social History     Socioeconomic History    Marital status: Single     Spouse name: None    Number of children: None    Years of education: None    Highest education level: None   Occupational History    None   Tobacco Use    Smoking status: Former Smoker    Smokeless tobacco: Never Used   Vaping Use    Vaping Use: Never used   Substance and Sexual Activity    Alcohol use: No    Drug use: No    Sexual activity: Yes     Partners: Male   Other Topics Concern    None   Social History Narrative    None     Social Determinants of Health     Financial Resource Strain:     Difficulty of Paying Living Expenses:    Food Insecurity:     Worried About Running Out of Food in the Last Year:     Ran Out of Food in the Last Year:    Transportation Needs:     Lack of Transportation (Medical):  Lack of Transportation (Non-Medical):    Physical Activity:     Days of Exercise per Week:     Minutes of Exercise per Session:    Stress:     Feeling of Stress :    Social Connections:     Frequency of Communication with Friends and Family:     Frequency of Social Gatherings with Friends and Family:     Attends [employer] Services:     Active Member of Clubs or Organizations:     Attends Club or Organization Meetings:     Marital Status:    Intimate Partner Violence:     Fear of Current or Ex-Partner:     Emotionally Abused:     Physically Abused:     Sexually Abused:        Physical Exam     Blood pressure (!) 170/49, pulse 103, temperature 99.2 °F (37.3 °C), temperature source Bladder, resp. rate 25, height 5' (1.524 m), weight 185 lb 13.6 oz (84.3 kg), last menstrual period 04/15/2012, SpO2 99 %.     General:  Critically ill, in the ICU on vent  HEENT:  PERRL, clear sclera   Neck:  Supple, no masses   Chest:  On vent, no wheezing, crackles   CV:  Tachycardia, no rub   Abdomen:  NTND, soft, +BS, no hepatosplenomegaly  Extremities:  + peripheral edema  Neurological:  EMERSON  Lymphatics:  No palpable lymph nodes  Skin:  No rash, no jaundice  Psychiatric:  Sedated on vent  :  Donis in place     Data     Recent Labs     08/23/21  0530   WBC 6.7   HGB 10.0*   HCT 29.4*   MCV 91.1        Recent Labs     08/22/21  0530 08/23/21  0530 08/24/21  0530   * 134* 132*   K 5.0 4.6 4.6    101 94*   CO2 26 23 22   GLUCOSE 177* 177* 293*   PHOS 4.0 3.9 7.3*   BUN 39* 48* 66*   CREATININE 0.8 0.7 2.1*   LABGLOM >60 >60 24*   GFRAA >60 >60 29*       Assessment:  Acute Kidney Injury:  KDIGO stage 2  - Etiology:  ATN    Hyponatremia:  Due to TE with impaired free water excretion    Acidosis:  Mostly respiratory, has improved with current ventilation     COVID19 Pneumonia with sepsis:  On max vent support in the ICU. Prognosis is poor     Plan:    Initially with acidemia planned to place on CRRT due to high risk for decompensation as there would be no renal compensation for respiratory acidosis. Discussed with family risk/benefit. Repeat ABG shows much improved oxygenation and improved acidosis. Felt the risk >> benefit from procedure of line placement considering would have to put her in the supine position. Hold CRRT. Discussed day to day assessment.   Family aware of poor prognosis    Critical care time = 35 minutes    Thank you for asking us to participate in the management of your patient, please do not hesitate to contact me for any concerns regarding my recommendations as outlined above.    -----------------------------  Lee Rehman M.D.   [facility]

## 2021-08-24 NOTE — PROGRESS NOTES
08/24/21 1213   Vent Information   Vent Type 980   Vent Mode AC/PC   Rate Set 25 bmp   Pressure Support 0 cmH20   FiO2  100 %   SpO2 99 %   SpO2/FiO2 ratio 99   Sensitivity 3   PEEP/CPAP 17   I Time/ I Time % 1.6 s   Humidification Source HME   Vent Patient Data   High Peep/I Pressure 20   Peak Inspiratory Pressure 37 cmH2O   Mean Airway Pressure 31 cmH20   Rate Measured 25 br/min   Vt Exhaled 471 mL   Minute Volume 11.8 Liters   I:E Ratio 2.00:1   Spontaneous Breathing Trial (SBT) RT Doc   Pulse 103   Additional Respiratory  Assessments   Resp 25   Position Prone   Alarm Settings   High Pressure Alarm 55 cmH2O   Low Minute Volume Alarm 2 L/min   Apnea (secs) 20 secs   High Respiratory Rate 40 br/min   Low Exhaled Vt  200 mL   ETT (adult)   Placement Date/Time: 08/17/21 1216   Tube Size: 7.5 mm  Laryngoscope: GlideScope  Blade Size: 4  Location: Oral  Placement Verified By[de-identified] Colorimetric EtCO2 device  Secured at: 23 cm  Placed By: Licensed provider   Secured at 23 cm   Measured From Lips   ET Placement Left   Secured By Commercial tube stuart   Site Condition Dry

## 2021-08-24 NOTE — PROGRESS NOTES
Patient was supined today and had to again be proned. I was notified that RT was having to bag her in the prone position to maintain an acceptable SpO2. She was placed in deep reverse trendelenberg and vent settings adjusted to an inverted I:E ratio of 3:1 as follows. PCV  15 / +16 / 22 / 100% w/ 100% FiO2 and Ti = 3 sec  Vt with these settings was ~ 400mL. Hypercapnea anticipated. Obtaining ABG's has been a problem. I attempted to place an arterial line in the left popliteal artery in the absence of other options, but was unsuccessful.       With ensuing hypercapnea the following vent changes were made:  PCV  20  / +16 / 22 / titrate  With Ti = 2 sec

## 2021-08-24 NOTE — PROGRESS NOTES
Pulmonary & Critical Care Medicine ICU Progress Note      Reason for visit: Acute respiratory failure, severe COVID-19 pneumonia. Past history of asthma, anxiety, iron deficiency anemia.     Events of Last 24 hours: Patient continues to be critically ill on mechanical vent support; had an episode of hypoxemia and hypotension last night and patient was started on multiple IV pressors, patient was on IV Levophed subsequently IV vasopressin was added and overnight patient had to be started on Shad-Synephrine infusion in addition to Levophed and vasopressin which was continuing when seen this morning, patient also was having hypoxemia and also patient was not maintaining saturation, patient underwent an emergent bronchoscopy and thick mucous plugs were lavaged out, patient also had decreasing pulses of the right hand and the arterial line was discontinued, patient had to be proned, patient also had to be put on pressure control ventilation last night, patient when seen this morning continues to be prone, patient continues to be on pressure control ventilation, patient was seen to have increased hypoventilation and hypercarbia and acute respiratory acidosis and patient's ventilator settings were changed overnight by the nocturnist, patient for some reason was also started on a bicarb drip overnight which was continuing when seen this morning, patient continues to be on multiple medications to maintain patient ventilator synchrony along with that patient continues to be  on neuromuscular blockade, patient was taken off the propofol infusion because of hypertriglyceridemia, patient has had a T-max of 102.5 °F, patient has sinus tachycardia on the monitor,; patient did not have any significant urine output overnight with cumulative fluid balance of + 6.6 L, patient's blood sugars are not optimally controlled, no other pertinent review of system could be obtained      Invasive Lines:   CVC RIJ 8/17  Art Line 8/17 MV: 8/17/2021      MV Settings:  Vent Mode: AC/PC Rate Set: 25 bmp/Vt Ordered: 500 mL/ [email or web address])    IV:   vasopressin (Septic Shock) infusion 0.01 Units/min (08/24/21 1326)    phenylephrine (OSVALDO-SYNEPHRINE) 50mg/250mL infusion Stopped (08/24/21 1300)    sodium bicarbonate infusion 150 mL/hr at 08/24/21 0639    cisatracurium (NIMBEX) infusion 1.5 mcg/kg/min (08/24/21 1300)    dexmedetomidine HCl in NaCl 0.8 mcg/kg/hr (08/24/21 1240)    midazolam 7.5 mg/hr (08/24/21 1000)    dextrose      fentaNYL (SUBLIMAZE) infusion 250 mcg/hr (08/24/21 1119)    norepinephrine 30 mcg/min (08/24/21 1119)    sodium chloride         Vitals:  BP (!) 166/47   Pulse 102   Temp 99.2 °F (37.3 °C) (Bladder)   Resp 25   Ht 5' (1.524 m)   Wt 185 lb 13.6 oz (84.3 kg)   LMP 04/15/2012   SpO2 98%   BMI 36.30 kg/m²          In-person bedside physical examination deferred. Pursuant to the emergency declaration under the [address] [place] [address] and the [employer] Act, this clinical encounter was conducted to provide necessary medical care. (Also consistent with new provisions and guidance offered by [facility] on March 18, 2020 in setting of COVID 19 outbreak and in order to preserve personal protective equipment in accordance with the flexibilities announced by CMS on March 30, 2020)   References: [email or web address]. [email or web address]/Portals/[email or web address]/Resources/COVID-19/3_18%20Telemed%20Guidance%20Updated%20March%2018. pdf?[email or web address]                      [email or web address]://[email or web address]. [facility]/Portals/0/Resources/COVID-19/3_18%20Telemed%20Guidance%20Updated%20March%2018. pdf?[email or web address]                      [email or web address]             Medications:  Scheduled Meds:   enoxaparin  1 mg/kg Subcutaneous Daily    insulin lispro  0-18 Units Subcutaneous Q4H    senna 2 tablet Per NG tube BID    docusate  100 mg Per NG tube BID    piperacillin-tazobactam  3,375 mg IntraVENous Q8H    hydrocortisone sodium succinate PF  100 mg IntraVENous Q8H    pantoprazole  40 mg IntraVENous Daily    carboxymethylcellulose PF  1 drop Both Eyes Q4H    chlorhexidine  15 mL Mouth/Throat BID    montelukast  10 mg Oral Nightly    PARoxetine  40 mg Oral Daily    sodium chloride flush  5-40 mL IntraVENous 2 times per day    Vitamin D  2,000 Units Oral Daily       PRN Meds:  midazolam, glucose, dextrose, glucagon (rDNA), dextrose, potassium chloride, guaiFENesin-dextromethorphan, sodium chloride flush, sodium chloride, ondansetron **OR** ondansetron, polyethylene glycol, acetaminophen **OR** acetaminophen    Results:  CBC:   Recent Labs     08/23/21  0530   WBC 6.7   HGB 10.0*   HCT 29.4*   MCV 91.1        BMP:   Recent Labs     08/22/21  0530 08/23/21  0530 08/24/21  0530   * 134* 132*   K 5.0 4.6 4.6    101 94*   CO2 26 23 22   PHOS 4.0 3.9 7.3*   BUN 39* 48* 66*   CREATININE 0.8 0.7 2.1*     Results for Abdulkadir Schwartz (MRN [id number]) as of 8/24/2021 13:44   Ref.  Range 8/23/2021 05:30 8/23/2021 06:15 8/23/2021 09:11 8/23/2021 12:22 8/23/2021 15:45 8/23/2021 17:34 8/24/2021 00:31 8/24/2021 05:30 8/24/2021 06:01 8/24/2021 09:34 8/24/2021 11:15 8/24/2021 11:44   Sodium Latest Ref Range: 136 - 145 mmol/L 134 (L)       132 (L)       Potassium Latest Ref Range: 3.5 - 5.1 mmol/L 4.6       4.6       Chloride Latest Ref Range: 99 - 110 mmol/L 101       94 (L)       CO2 Latest Ref Range: 21 - 32 mmol/L 23       22       BUN Latest Ref Range: 7 - 20 mg/dL 48 (H)       66 (H)       Creatinine Latest Ref Range: 0.6 - 1.2 mg/dL 0.7       2.1 (H)       Anion Gap Latest Ref Range: 3 - 16  10       16       GFR Non- Latest Ref Range: >60  >60       24 (A)       GFR  Latest Ref Range: >60  >60       29 (A)       Lactic Acid Latest Ref Range: 0.4 - 2.0 mmol/L report for complete BCID panel. Organism Abnormal  08/22/2021 12:00 AM [facility]   Staphylococcus aureus    Blood Culture, Routine 08/22/2021 12:00 AM [address] [address] for   Sensitivity to follow   Isolated one of two sets    Testing Performed By    Lab - Abbreviation Name Director Address Valid Date Range   [facility] [address] [facility] Carlota Sanchez M.D. [address] [facility] [address] 09/26/20 0000-Present   Narrative    Staphylococcus aureus    CULTURE, RESPIRATORY 08/23/2021  5:30 AM [facility]   Heavy growth   Sensitivity to follow    Organism Abnormal  08/23/2021  5:30 AM [facility]   BHS Group B (Strep agalacticae)    CULTURE, RESPIRATORY 08/23/2021  5:30 AM [facility]   Heavy growth   Susceptibility testing of penicillin and other beta lactams is   not necessary for beta hemolytic Streptococci since resistant   strains have not been identified. (CLSI M100)      Results for Vamshi Rendon (MRN [id number]) as of 8/24/2021 13:44   Ref.  Range 8/23/2021 09:20 8/24/2021 00:31 8/24/2021 06:01 8/24/2021 11:15   Hemoglobin, Art, Extended Latest Ref Range: 12.0 - 16.0 g/dL 11.2 (L)      pH, Arterial Latest Ref Range: 7.350 - 7.450  7.305 (L)      pCO2, Arterial Latest Ref Range: 35.0 - 45.0 mmHg 55.8 (H)      pO2, Arterial Latest Ref Range: 75.0 - 108.0 mmHg 83.7      HCO3, Arterial Latest Ref Range: 21.0 - 29.0 mmol/L 27.2      TCO2 (calc), Art Latest Ref Range: Not Established mmol/L 28.9      Base Excess, Arterial Latest Ref Range: -3.0 - 3.0 mmol/L 0.1      O2 Sat, Arterial Latest Ref Range: >92 % 94.9      Methemoglobin, Arterial Latest Ref Range: <1.5 % 0.3      Carboxyhgb, Arterial Latest Ref Range: 0.0 - 1.5 % 0.3      pH, Boby Latest Ref Range: 7.350 - 7.450   6.924 (LL) 7.025 (LL) 7.289 (L)   pCO2, Boby Latest Ref Range: 40.0 - 50.0 mmHg  106.6 (H) 98.3 (H) 45.1   pO2, Boby Latest Ref Range: 25 - 40 mmHg  66 61 205.5 (H)   HCO3, Venous Latest Ref Range: 23.0 - 29.0 mmol/L  22.1 (L) 25.7 21.2 (L)   TC02 (Calc), Gem Latest Ref Range: Not Established mmol/L  25 29 23   Base Excess, Gem Latest Ref Range: -3.0 - 3.0 mmol/L  -11 (L) -5 (L) -5.3 (L)   MetHgb, Gem Latest Ref Range: <1.5 %    0.6   O2 Sat, Gem Latest Ref Range: Not Established %  74 76 99   Sample Type Unknown  GEM GEM      Assessment and plan:  Acute respiratory failure, hypoxemic. Ventilatory support to keep saturation being 90-94% only  Patient had to be performed last night because of worsening hypoxemia  Ventilator settings and waveforms reviewed  Ventilator changes made and discussed with the respiratory therapist  IV sedation to maintain patient ventilator synchrony-propofol has been d/sandra   IV neuromuscular blockade along with IV sedation to continue for now   Nursing was requested to see if patient's Precedex infusion can be discontinued  Titration of sedation as per RASS score/BIS  Pulmonary toilet  Monitor input output and BMP  Correct electrolytes on whenever necessary basis  We will trend the inflammatory markers  Acute COVID-19 pneumonia. Presented with GI symptoms and confusion, those had resolved but her respiratory status has worsened. ID following, received Actemra. On IV steroids  Staph aureus bacteremia along with Group b strep infection -patient was started on IV Zosyn-titration as per clinical status and c/s  TE-likely to be secondary to ATN -may require RRT -renal consult placed   Bicarb drip changed to plasmalyte infusion   Sepsis with septic shock and lactic acidosis-patient got IV fluids as per sepsis protocol and is on multiple per IV pressors to maintain hemodynamics  Abnormal LFT. Did have steatosis on CT, could be related to COVID-19 infection as well. Hyperglycemia. May need to be monitored and placed on SSI, better controlled  CINDY. On CPAP at home. Details not available.   Address when she improves  Bronchial asthma. Mild intermittent as per the patient. Presently no wheezing. Bronchodilator on PRN basis   Elevated d-dimers. Lovenox Increased to 1 mg/kg sc Q12H-may need to change to IV heparin /Eliquis    Case discussed with ICU team/internal medicine/nephrology      Critical care time spent reviewing labs/films, examining patient, collaborating with other physicians but excluding procedures for life threatening organ failure is 85 minutes . Multidisciplinary rounds were completed at the bedside with participation from the intensivist, pharmacy, nursing, nutrition.   All labs and imaging studies reviewed, discussed    Patient's clinical status has taken a turn for the worse and patient's overall clinical status is very precarious with possibility of nonsurvival-has been discussed with family      Electronically signed by:  Abner Stroud MD    8/24/2021    1:54 PM.

## 2021-08-24 NOTE — PROGRESS NOTES
08/23/21 2322   Vent Information   Vent Type 980   Vent Mode AC/PC   Pressure Ordered 16   Rate Set 15 bmp   Pressure Support 0 cmH20   FiO2  100 %   Sensitivity 3   PEEP/CPAP 20   I Time/ I Time % 3 s   Humidification Source HME   Vent Patient Data   High Peep/I Pressure 16   Peak Inspiratory Pressure 37 cmH2O   Mean Airway Pressure 32 cmH20   Rate Measured 15 br/min   Vt Exhaled 442 mL   Minute Volume 6.56 Liters   I:E Ratio 3.00:1   Spontaneous Breathing Trial (SBT) RT Doc   Pulse 134   Breath Sounds   Right Upper Lobe Diminished   Right Middle Lobe Diminished   Right Lower Lobe Diminished   Left Upper Lobe Diminished   Left Lower Lobe Diminished   Additional Respiratory  Assessments   Resp 20   Alarm Settings   High Pressure Alarm 55 cmH2O   Low Minute Volume Alarm 2 L/min   High Respiratory Rate 40 br/min   Low Exhaled Vt  200 mL   ETT (adult)   Placement Date/Time: 08/17/21 1216   Tube Size: 7.5 mm  Laryngoscope: GlideScope  Blade Size: 4  Location: Oral  Placement Verified By[de-identified] Colorimetric EtCO2 device  Secured at: 23 cm  Placed By: Licensed provider   Secured at 23 cm   Measured From Lips   ET Placement Left   Secured By Commercial tube stuart   Site Condition Dry   Cuff Pressure 30 cm H2O

## 2021-08-24 NOTE — ACP (ADVANCE CARE PLANNING)
ADVANCED CARE PLANNING - REMOTE     Name:Jena Watkins       :  1960              MRN:  [id number]  Admission Date: 2021  7:13 PM  Date of Discussion:  2021      Purpose of Encounter: Advanced care planning in light of clinical course. Parties in attendance: :Tl Ibrahim MD, Family members:Alfredo Watkins  Decisional Capacity:No    Héctor Carranza spoke with PO and Brissa Sebas. Per [facility] sierra siblings are decision makers. Landon Arana said he spoke to Josep BF who is in agreement. Remote ACP visit was performed based on new provisions and guidance offered by [facility] on 2020 in setting of COVID 19 outbreak and in order to preserve personal protective equipment in accordance with the flexibilities announced by CMS on 2020. References:   [email or web address]/Resources/COVID-19/3_18%20Telemed%20Guidance%20Updated%20March%2018. pdf?[id number]   [email or web address] pdf     Objective/Medical Story: 57yoF who presented on  with n/v/diarrhea in addition to changes in mentation in the setting of recently being diagnosed with COVID-19 who was found to have sepsis physiology from progression of COVID-19. Hospital course c/b refractory hypoxemia in the setting of severe COVID-19 pneumonia and septic shock. Intubated . Ongoing supination and pronation. Now with renal failure. Active Diagnoses:     Active Hospital Problems    Diagnosis Date Noted    Bacteremia due to Staphylococcus aureus [R78.81, B95.61] 2021    Septic shock ([address] [A41.9, R65.21] 2021    Elevated d-dimer [R79.89] 2021    Pulmonary infiltrates [R91.8]     Acute respiratory failure with hypoxia (HCC) [J96.01]     Altered mental status [R41.82]     Mild intermittent asthma without complication [T82.08]     CINDY on CPAP [G47.33, Z99.89]     Elevated transaminase level [R74.01]     Hepatic steatosis [K76.0]     Obesity (BMI 35.0-39.9 without comorbidity) [E66.9]     2019 novel coronavirusinfected pneumonia (NCIP) [U07.1, J12.82] 08/13/2021       These active diagnoses are of sufficient risk that focused discussion on advance care planning is indicated in order to allow the patient to thoughtfully consider personal goals of care; and if situations arise that prevent the ability to personally give input, to ensure appropriate representation of their personal desires for different levels and agressiveness of care. Goals of Care Determinations: Patient wishes to focus on continuing care. Interested in speaking with nephrology team before pursuing any renal replacement therapy as it is unclear if this would be in david with the pts wishes. Ariel Cedillo will discuss with his siblings and hopes to speak with nephrology. They do not wish to transition to comfort measures at this time but do wish to change code status. Code Status: At this time patient wishes to be  Limited without cardiac resuscitation. I have updated the nurse as well. Time Spent:     Total time spent face-to-face in education and discussion: >15 minutes on telephone        Electronically signed by Maryam Garces MD on 8/24/2021 at 12:25 PM    Thank you No primary care provider on file. for the opportunity to be involved in this patient's care. If you have any questions or concerns please feel free to contact me at [phone].

## 2021-08-24 NOTE — PROGRESS NOTES
08/24/21 0404   Vent Information   Vent Type 980   Vent Mode AC/PC   Pressure Ordered 16   Rate Set 20 bmp   Pressure Support 0 cmH20   FiO2  100 %   SpO2 98 %   SpO2/FiO2 ratio 98   Sensitivity 3   PEEP/CPAP 20   I Time/ I Time % 2 s   Humidification Source HME   Vent Patient Data   High Peep/I Pressure 16   Peak Inspiratory Pressure 37 cmH2O   Mean Airway Pressure 31 cmH20   Rate Measured 20 br/min   Vt Exhaled 417 mL   Minute Volume 8.33 Liters   I:E Ratio 2.00:1   Spontaneous Breathing Trial (SBT) RT Doc   Pulse 121   Breath Sounds   Right Upper Lobe Diminished   Right Middle Lobe Diminished   Right Lower Lobe Diminished   Left Upper Lobe Diminished   Left Lower Lobe Diminished   Additional Respiratory  Assessments   Resp 20   Alarm Settings   High Pressure Alarm 55 cmH2O   Low Minute Volume Alarm 2 L/min   High Respiratory Rate 40 br/min   Low Exhaled Vt  200 mL   ETT (adult)   Placement Date/Time: 08/17/21 1216   Tube Size: 7.5 mm  Laryngoscope: GlideScope  Blade Size: 4  Location: Oral  Placement Verified By[de-identified] Colorimetric EtCO2 device  Secured at: 23 cm  Placed By: Licensed provider   Secured at 23 cm   Measured From Lips   ET Placement Left   Secured By Commercial tube stuart   Site Condition Dry   Cuff Pressure 30 cm H2O

## 2021-08-24 NOTE — PROGRESS NOTES
08/24/21 0836   Vent Information   Vent Type 980   Vent Mode AC/PC   Rate Set 25 bmp   Pressure Support 0 cmH20   FiO2  100 %   SpO2 99 %   SpO2/FiO2 ratio 99   Sensitivity 3   PEEP/CPAP 17   I Time/ I Time % 1.6 s   Humidification Source HME  (changed)   Vent Patient Data   High Peep/I Pressure 20   Peak Inspiratory Pressure 38 cmH2O   Mean Airway Pressure 31 cmH20   Rate Measured 25 br/min   Vt Exhaled 501 mL   Minute Volume 12.5 Liters   I:E Ratio 2.00:1   Cough/Sputum   Sputum How Obtained Suctioned;Endotracheal   Cough Non-productive   Spontaneous Breathing Trial (SBT) RT Doc   Pulse 112   Breath Sounds   Right Upper Lobe Diminished   Right Middle Lobe Diminished   Right Lower Lobe Diminished   Left Upper Lobe Diminished   Left Lower Lobe Diminished   Additional Respiratory  Assessments   Resp 25   Position Prone   Alarm Settings   High Pressure Alarm 55 cmH2O   Low Minute Volume Alarm 2 L/min   Apnea (secs) 20 secs   High Respiratory Rate 40 br/min   Low Exhaled Vt  200 mL   ETT (adult)   Placement Date/Time: 08/17/21 1216   Tube Size: 7.5 mm  Laryngoscope: GlideScope  Blade Size: 4  Location: Oral  Placement Verified By[de-identified] Colorimetric EtCO2 device  Secured at: 23 cm  Placed By: Licensed provider   Secured at 23 cm   Measured From Lips   ET Placement Left   Secured By Commercial tube stuart   Site Condition Dry   Cuff Pressure 30 cm H2O

## 2021-08-24 NOTE — PROGRESS NOTES
08/24/21 1653   Vent Information   Vent Type 980   Vent Mode AC/PC   Rate Set 25 bmp   Pressure Support 0 cmH20   FiO2  80 %   SpO2 (!) 89 %   SpO2/FiO2 ratio 111.25   Sensitivity 3   PEEP/CPAP 17   I Time/ I Time % 1.6 s   Humidification Source HME   Vent Patient Data   High Peep/I Pressure 20   Peak Inspiratory Pressure 37 cmH2O   Mean Airway Pressure 31 cmH20   Rate Measured 25 br/min   Vt Exhaled 523 mL   Minute Volume 13.1 Liters   I:E Ratio 2.00:1   Cough/Sputum   Sputum How Obtained Suctioned;Endotracheal   Cough Productive   Frequency Occasional   Sputum Amount Moderate   Sputum Color Cloudy;Dark red   Tenacity Thin   Spontaneous Breathing Trial (SBT) RT Doc   Pulse 102   Breath Sounds   Right Upper Lobe Diminished   Right Middle Lobe Diminished   Right Lower Lobe Diminished   Left Upper Lobe Diminished   Left Lower Lobe Diminished   Additional Respiratory  Assessments   Resp 25   Position Prone   Alarm Settings   High Pressure Alarm 55 cmH2O   Low Minute Volume Alarm 2 L/min   Apnea (secs) 20 secs   High Respiratory Rate 40 br/min   Low Exhaled Vt  200 mL   ETT (adult)   Placement Date/Time: 08/17/21 1216   Tube Size: 7.5 mm  Laryngoscope: GlideScope  Blade Size: 4  Location: Oral  Placement Verified By[de-identified] Colorimetric EtCO2 device  Secured at: 23 cm  Placed By: Licensed provider   Secured at 23 cm   Measured From Lips   ET Placement Right   Secured By Commercial tube stuart   Site Condition Dry   Cuff Pressure 30 cm H2O

## 2021-08-24 NOTE — PROGRESS NOTES
08/23/21 2050   Vent Information   Rate Set 15 bmp   Pressure Support 0 cmH20   FiO2  100 %   SpO2 98 %   SpO2/FiO2 ratio 98   Sensitivity 3   PEEP/CPAP 22   I Time/ I Time % 3 s   Vent Patient Data   High Peep/I Pressure 16   Peak Inspiratory Pressure 39 cmH2O   Mean Airway Pressure 34 cmH20   Rate Measured 15 br/min   Vt Exhaled 383 mL   Minute Volume 5.74 Liters   I:E Ratio 3.00:1   Spontaneous Breathing Trial (SBT) RT Doc   Pulse 114   Additional Respiratory  Assessments   Resp 15   Alarm Settings   High Pressure Alarm 55 cmH2O   Low Minute Volume Alarm 2 L/min   High Respiratory Rate 40 br/min

## 2021-08-24 NOTE — PROGRESS NOTES
This note also relates to the following rows which could not be included:  Rate Set - Cannot attach notes to unvalidated device data  Pressure Support - Cannot attach notes to unvalidated device data  FiO2  - Cannot attach notes to unvalidated device data  SpO2 - Cannot attach notes to unvalidated device data  Sensitivity - Cannot attach notes to unvalidated device data  I Time/ I Time % - Cannot attach notes to unvalidated device data  High Peep/I Pressure - Cannot attach notes to unvalidated device data  Peak Inspiratory Pressure - Cannot attach notes to unvalidated device data  Mean Airway Pressure - Cannot attach notes to unvalidated device data  Rate Measured - Cannot attach notes to unvalidated device data  Vt Exhaled - Cannot attach notes to unvalidated device data  Minute Volume - Cannot attach notes to unvalidated device data  Pulse - Cannot attach notes to unvalidated device data  Resp - Cannot attach notes to unvalidated device data  High Pressure Alarm - Cannot attach notes to unvalidated device data  Low Minute Volume Alarm - Cannot attach notes to unvalidated device data  High Respiratory Rate - Cannot attach notes to unvalidated device data       08/23/21 2011   Vent Information   Vent Type 980   Vent Mode AC/PC   Pressure Ordered 16   PEEP/CPAP 22   Vent Patient Data   I:E Ratio 3.00:1   Per Dr Taty Cox

## 2021-08-24 NOTE — CARE COORDINATION
Care being managed by Francesco Alejandre, ID and Neph. LOS 8. Of note- ACP done by MD and pt now a limited code. May need CRRT. Pt has no insurance- will imapct if DC services required. CM continue to follow.   Divine Marley RN

## 2021-08-24 NOTE — PROGRESS NOTES
08/24/21 1934   Vent Information   Skin Assessment Clean, dry, & intact   Vent Type 980   Vent Mode AC/PC   Pressure Ordered 16   Rate Set 25 bmp   Pressure Support 0 cmH20   FiO2  80 %   SpO2 92 %   SpO2/FiO2 ratio 115   Sensitivity 3   PEEP/CPAP 17   I Time/ I Time % 1.6 s   Humidification Source HME   Vent Patient Data   High Peep/I Pressure 20   Peak Inspiratory Pressure 38 cmH2O   Mean Airway Pressure 31 cmH20   Rate Measured 25 br/min   Vt Exhaled 500 mL   Minute Volume 12.5 Liters   I:E Ratio 2.00:1   Cough/Sputum   Sputum How Obtained Endotracheal   Cough Productive   Sputum Amount Moderate   Sputum Color Creamy;Dark red   Tenacity Thick   Spontaneous Breathing Trial (SBT) RT Doc   Pulse 106   Breath Sounds   Right Upper Lobe Diminished   Right Middle Lobe Diminished   Right Lower Lobe Diminished   Left Upper Lobe Diminished   Left Lower Lobe Diminished   Additional Respiratory  Assessments   Resp 25   Position Prone   Cuff Pressure (cm H2O) 30 cm H2O   Alarm Settings   High Pressure Alarm 55 cmH2O   Low Minute Volume Alarm 2 L/min   High Respiratory Rate 40 br/min   Low Exhaled Vt  200 mL   Patient Observation   Observations Ambu @ bedside.    ETT (adult)   Placement Date/Time: 08/17/21 1216   Tube Size: 7.5 mm  Laryngoscope: GlideScope  Blade Size: 4  Location: Oral  Placement Verified By[de-identified] Colorimetric EtCO2 device  Secured at: 23 cm  Placed By: Licensed provider   Secured at 23 cm   Measured From [address] [place] [address],[address] By Commercial tube stuart   Site Condition Dry   Cuff Pressure 30 cm H2O

## 2021-08-24 NOTE — PROGRESS NOTES
Hospitalist Progress Note      PCP: No primary care provider on file. Date of Admission: 2021    Chief Complaint: [facility] Course: 57yoF who presented on  with n/v/diarrhea in addition to changes in mentation in the setting of recently being diagnosed with COVID-19 who was found to have sepsis physiology from progression of COVID-19. Hospital course c/b refractory hypoxemia in the setting of severe COVID-19 pneumonia and septic shock. Intubated . Ongoing supination and pronation. Subjective: unable to assess d/t clinical status. 24 hours events: demonstrated clinical deterioration developing worsening acidosis. Most recent VB/98; lactic acid 4.4. BMP with new creatinine 2.1 and Co2 of 22. Overnight required bagging to and proning to improve oxygenation. Remains on NE, Phenylephrine and vasopressin, hydrocort. On cistra, dexmedetomidine, midazolam. Prop held d/t hyper triglycerides. TF trickle feeds restarted. UOP charted as 8240, in 24 hours however worry this is inacurate. Fever max 101.3 . Started abcx per ICU team to cover HAP.     Medications:  Reviewed    Infusion Medications    vasopressin (Septic Shock) infusion 0.03 Units/min (21 0554)    phenylephrine (OSVALDO-SYNEPHRINE) 50mg/250mL infusion 20 mcg/min (21 0930)    sodium bicarbonate infusion 150 mL/hr at 21 0639    cisatracurium (NIMBEX) infusion 1.5 mcg/kg/min (21 2030)    dexmedetomidine HCl in NaCl 0.8 mcg/kg/hr (21 0646)    midazolam 7.5 mg/hr (21 1000)    dextrose      fentaNYL (SUBLIMAZE) infusion 250 mcg/hr (21 6844)    norepinephrine 30 mcg/min (21 0109)    sodium chloride       Scheduled Medications    enoxaparin  1 mg/kg Subcutaneous Daily    insulin lispro  0-18 Units Subcutaneous Q4H    senna  2 tablet Per NG tube BID    docusate  100 mg Per NG tube BID    piperacillin-tazobactam  3,375 mg Intravenous Q8H    hydrocortisone sodium succinate PF  100 mg Intravenous Q8H    pantoprazole  40 mg Intravenous Daily    carboxymethylcellulose PF  1 drop Both Eyes Q4H    chlorhexidine  15 mL Mouth/Throat BID    montelukast  10 mg Oral Nightly    PARoxetine  40 mg Oral Daily    sodium chloride flush  5-40 mL Intravenous 2 times per day    Vitamin D  2,000 Units Oral Daily     PRN Meds: midazolam, glucose, dextrose, glucagon (rDNA), dextrose, potassium chloride, guaiFENesin-dextromethorphan, sodium chloride flush, sodium chloride, ondansetron **OR** ondansetron, polyethylene glycol, acetaminophen **OR** acetaminophen      Intake/Output Summary (Last 24 hours) at 8/24/2021 1001  Last data filed at 8/24/2021 0630  Gross per 24 hour   Intake 4527.9 ml   Output 280 ml   Net 4247.9 ml       Physical Exam Performed:    BP (!) 124/39   Pulse 112   Temp 99.1 °F (37.3 °C) (Bladder)   Resp 25   Ht 5' (1.524 m)   Wt 185 lb 13.6 oz (84.3 kg)   LMP 04/15/2012   SpO2 99%   BMI 36.30 kg/m²     General appearance: pronated, appears comfortable though is medically paralyzed  Neck: intubated  Respiratory:  Normal respiratory effort. Faint crackles, bilaterally. (posterior exam only d/t clinical status)  Cardiovascular: Regular rate and rhythm with normal S1/S2 without murmurs, rubs or gallops. Abdomen: Soft, non-tender, non-distended with normal bowel sounds. Musculoskeletal: No clubbing, cyanosis or edema bilaterally. Full range of motion without deformity. Skin: Skin color, texture, turgor normal.  No rashes or lesions.  Left scapular tattoo  Neurologic:  Paralyzed and sedated, RASS-4  Peripheral Pulses: +2 palpable, equal bilaterally       Labs:   Recent Labs     08/23/21  0530   WBC 6.7   HGB 10.0*   HCT 29.4*        Recent Labs     08/22/21  0530 08/23/21  0530 08/24/21  0530   * 134* 132*   K 5.0 4.6 4.6    101 94*   CO2 26 23 22   BUN 39* 48* 66*   CREATININE 0.8 0.7 2.1*   CALCIUM 7.9* 8.1* 7.0*   PHOS 4.0 3.9 7.3*     No results for input(s): AST, ALT, BILIDIR, BILITOT, ALKPHOS in the last 72 hours. No results for input(s): INR in the last 72 hours. No results for input(s): Towana Ball in the last 72 hours. Urinalysis:      Lab Results   Component Value Date    NITRU Negative 08/12/2021    WBCUA 10-20 08/12/2021    BACTERIA 1+ 08/12/2021    RBCUA 3-4 08/12/2021    BLOODU TRACE-INTACT 08/12/2021    SPECGRAV >=1.030 08/12/2021    GLUCOSEU Negative 08/12/2021       Radiology:  XR CHEST PORTABLE   Final Result   No change in the tubes and catheters. Stable cardiomegaly with worsening pulmonary edema. Increasing airspace opacity throughout both lungs which could be due to   pulmonary edema and/or worsening pneumonia      Questionable small bibasilar pleural effusions which are more prominent         XR ABDOMEN FOR NG/OG/NE TUBE PLACEMENT   Final Result   Status post placement of nasogastric tube with distal tip located in the   region the stomach as described above. XR CHEST PORTABLE   Final Result   1. Status post placement of endotracheal tube with distal tip located   approximately 3.5 cm above the elsi. 2. Status post placement of right internal jugular central line with distal   tip located near junction of superior vena cava and right atrium. No   evidence of pneumothorax. XR CHEST PORTABLE   Final Result   Diffuse bilateral airspace disease. Aeration is improved compared to prior   study. XR CHEST PORTABLE   Final Result   Significant worsening in appearance of bilateral pneumonia         CT Head WO Contrast   Final Result   No acute intracranial abnormality. CT ABDOMEN PELVIS W IV CONTRAST Additional Contrast? None   Final Result   1. Mucosal thickening and enhancement of the terminal ileum, proximal colon   and sigmoid colon/rectum. Pattern is nonspecific and suggests underlying   enterocolitis. Infectious or inflammatory etiologies may be considered.    2. No pattern of bowel obstruction. 3. Hepatic steatosis. 4. Airspace changes in the lower chest likely represent underlying viral   pneumonia. XR CHEST PORTABLE   Final Result   Perihilar opacities could represent COVID pneumonia given indication. Correlate with presentation to exclude superimposed congestive heart failure. Assessment/Plan:    Active Hospital Problems    Diagnosis     Bacteremia due to Staphylococcus aureus [R78.81, B95.61]     Septic shock ([facility] [address] [A41.9, R65.21]     Elevated d-dimer [R79.89]     Pulmonary infiltrates [R91.8]     Acute respiratory failure with hypoxia (HCC) [J96.01]     Altered mental status [R41.82]     Mild intermittent asthma without complication [Z22.98]     CINDY on CPAP [G47.33, Z99.89]     Elevated transaminase level [R74.01]     Hepatic steatosis [K76.0]     Obesity (BMI 35.0-39.9 without comorbidity) [E66.9]     2019 novel coronavirusinfected pneumonia (NCIP) [U07.1, J12.82]      60yoF who presented on 08/12 with n/v/diarrhea in addition to changes in mentation in the setting of recently being diagnosed with COVID-19 who was found to have sepsis physiology from progression of COVID-19. Hospital course c/b refractory hypoxemia in the setting of severe COVID-19 pneumonia s/p steroids, IL6 inhibitor now c/b septic shock. Intubated 08/17. Ongoing supination and pronation. Severe Acute, COVID-19 Pneumonia w/ shock- p/w GI sxs and dehydration. Ongoing challenges with oxygenation. - appreciate pulm reccs  - appreciate ID reccs  - s/p solumedrol 40mg BID (08/13-08/22)  - s/p tociluzimab (08/16)    Acute hypoxic respiratory failure ARDS- likely from COVID-19. PaO2/FiO2: 125. Previous echo 2013 w/ nrl EF.  - respiratory cultures- 3+ Gram positive cocci, appears to be MSSA. Continue pip-tazo, agree with ICU  - PRN IV diuresis per Pulm     TE- worsening renal function with poor UOP. Agree with nephrology consult as pt may need CRRT.     N/v/abd pain/diarrhea- likely from covid, CTa/p done(suggestive of enterocolitis, no obstruction, noted hepatic steatosis)  -continue supportive care  - cdiff negative  -ua was abn and ucx > 50k mixed mike, no UTI at this time     Asthma- on home singulair     Anxiety-continued paxil     attempted to call family: Aracelis Lainez at 1000 on 08/24. No answer. DVT Prophylaxis: enoxaparin BID  GI ppx: IV PPI  Diet: Diet NPO  ADULT TUBE FEEDING; Nasogastric; Renal Formula; Continuous; 10; No; 60; Q 4 hours; Protein; Administer 2 proteinex modulars daily through NGT.  Flush with 30 Ml free water  Code Status: Full Code    PT/OT Eval Status: pending clinical course    Dispo - pending clinical course    Akshat Mahoney MD

## 2021-08-24 NOTE — PROGRESS NOTES
Patient SpO2 dropped to 70-60's on the vent and she was bagged to maintained saturations. Dr. Arnold Wilcox notified via perfect serve and came to bedside. Vent settings we re changed and plan for an arterial line placement.  Will continue to monitor

## 2021-08-24 NOTE — PROGRESS NOTES
Patient running fever 102.7 F. Cooling blanket and ice packs placed on her. She also became hypotensive again levo was maxed so adrianne was restarted. Brother Sheryl Bai was updated on status.  Will continue to monitor

## 2021-08-25 VITALS
SYSTOLIC BLOOD PRESSURE: 85 MMHG | HEIGHT: 60 IN | HEART RATE: 118 BPM | WEIGHT: 185.85 LBS | BODY MASS INDEX: 36.49 KG/M2 | TEMPERATURE: 101.7 F | DIASTOLIC BLOOD PRESSURE: 61 MMHG | RESPIRATION RATE: 20 BRPM | OXYGEN SATURATION: 81 %

## 2021-08-25 LAB
BLOOD CULTURE, ROUTINE: ABNORMAL
BLOOD CULTURE, ROUTINE: ABNORMAL
CULTURE, RESPIRATORY: ABNORMAL
GLUCOSE BLD-MCNC: 137 MG/DL (ref 70–99)
GRAM STAIN RESULT: ABNORMAL
ORGANISM: ABNORMAL
PERFORMED ON: ABNORMAL

## 2021-08-25 PROCEDURE — 94003 VENT MGMT INPAT SUBQ DAY: CPT

## 2021-08-25 PROCEDURE — 99291 CRITICAL CARE FIRST HOUR: CPT | Performed by: INTERNAL MEDICINE

## 2021-08-25 PROCEDURE — 6360000002 HC RX W HCPCS: Performed by: INTERNAL MEDICINE

## 2021-08-25 PROCEDURE — 94761 N-INVAS EAR/PLS OXIMETRY MLT: CPT

## 2021-08-25 PROCEDURE — 2580000003 HC RX 258: Performed by: INTERNAL MEDICINE

## 2021-08-25 PROCEDURE — 6370000000 HC RX 637 (ALT 250 FOR IP): Performed by: INTERNAL MEDICINE

## 2021-08-25 PROCEDURE — 2700000000 HC OXYGEN THERAPY PER DAY

## 2021-08-25 RX ORDER — SCOLOPAMINE TRANSDERMAL SYSTEM 1 MG/1
1 PATCH, EXTENDED RELEASE TRANSDERMAL
Status: DISCONTINUED | OUTPATIENT
Start: 2021-08-25 | End: 2021-08-25 | Stop reason: HOSPADM

## 2021-08-25 RX ORDER — DIPHENOXYLATE HYDROCHLORIDE AND ATROPINE SULFATE 2.5; .025 MG/1; MG/1
1 TABLET ORAL 4 TIMES DAILY PRN
Status: DISCONTINUED | OUTPATIENT
Start: 2021-08-25 | End: 2021-08-25 | Stop reason: HOSPADM

## 2021-08-25 RX ORDER — LORAZEPAM 2 MG/ML
1 INJECTION INTRAMUSCULAR EVERY 6 HOURS PRN
Status: DISCONTINUED | OUTPATIENT
Start: 2021-08-25 | End: 2021-08-25

## 2021-08-25 RX ORDER — LORAZEPAM 2 MG/ML
1 INJECTION INTRAMUSCULAR EVERY 12 HOURS PRN
Status: DISCONTINUED | OUTPATIENT
Start: 2021-08-25 | End: 2021-08-25

## 2021-08-25 RX ORDER — LORAZEPAM 2 MG/ML
1 INJECTION INTRAMUSCULAR
Status: DISCONTINUED | OUTPATIENT
Start: 2021-08-25 | End: 2021-08-25 | Stop reason: HOSPADM

## 2021-08-25 RX ORDER — LORAZEPAM 2 MG/ML
1 INJECTION INTRAMUSCULAR EVERY 4 HOURS
Status: DISCONTINUED | OUTPATIENT
Start: 2021-08-25 | End: 2021-08-25

## 2021-08-25 RX ORDER — FENTANYL CITRATE 50 UG/ML
25 INJECTION, SOLUTION INTRAMUSCULAR; INTRAVENOUS EVERY 10 MIN PRN
Status: DISCONTINUED | OUTPATIENT
Start: 2021-08-25 | End: 2021-08-25 | Stop reason: HOSPADM

## 2021-08-25 RX ADMIN — ACETAMINOPHEN 650 MG: 650 SUPPOSITORY RECTAL at 00:03

## 2021-08-25 RX ADMIN — PIPERACILLIN AND TAZOBACTAM 3375 MG: 3; .375 INJECTION, POWDER, LYOPHILIZED, FOR SOLUTION INTRAVENOUS at 01:10

## 2021-08-25 RX ADMIN — HYDROCORTISONE SODIUM SUCCINATE 100 MG: 100 INJECTION, POWDER, FOR SOLUTION INTRAMUSCULAR; INTRAVENOUS at 01:10

## 2021-08-25 RX ADMIN — MIDAZOLAM 150 MG/HR: 5 INJECTION INTRAMUSCULAR; INTRAVENOUS at 02:46

## 2021-08-25 RX ADMIN — FENTANYL CITRATE 150 MCG/HR: 0.05 INJECTION, SOLUTION INTRAMUSCULAR; INTRAVENOUS at 05:02

## 2021-08-25 ASSESSMENT — PULMONARY FUNCTION TESTS
PIF_VALUE: 47
PIF_VALUE: 47
PIF_VALUE: 40
PIF_VALUE: 41
PIF_VALUE: 40
PIF_VALUE: 47
PIF_VALUE: 41
PIF_VALUE: 47
PIF_VALUE: 40
PIF_VALUE: 47
PIF_VALUE: 42
PIF_VALUE: 47
PIF_VALUE: 41
PIF_VALUE: 40
PIF_VALUE: 43
PIF_VALUE: 41
PIF_VALUE: 47
PIF_VALUE: 47
PIF_VALUE: 40
PIF_VALUE: 41
PIF_VALUE: 40
PIF_VALUE: 40
PIF_VALUE: 47
PIF_VALUE: 40
PIF_VALUE: 47
PIF_VALUE: 41
PIF_VALUE: 41
PIF_VALUE: 47
PIF_VALUE: 40

## 2021-08-25 ASSESSMENT — PAIN SCALES - GENERAL: PAINLEVEL_OUTOF10: 0

## 2021-08-25 NOTE — PROGRESS NOTES
08/25/21 0429   Vent Information   $Ventilation $Subsequent Day   Skin Assessment Clean, dry, & intact   Equipment Changed HME   Vent Type 980   Vent Mode AC/PC   Pressure Ordered 25   Rate Set 20 bmp   Pressure Support 0 cmH20   FiO2  100 %   SpO2 (!) 73 %   SpO2/FiO2 ratio 73   Sensitivity 3   PEEP/CPAP 22   I Time/ I Time % 2.2 s   Vent Patient Data   High Peep/I Pressure 25   Peak Inspiratory Pressure 47 cmH2O   Mean Airway Pressure 40 cmH20   Rate Measured 20 br/min   Vt Exhaled 390 mL   Minute Volume 7.8 Liters   I:E Ratio 2.80:1   Cough/Sputum   Sputum How Obtained Endotracheal   Cough Productive   Sputum Amount Moderate   Sputum Color Red   Tenacity Thick   Spontaneous Breathing Trial (SBT) RT Doc   Pulse 110   Breath Sounds   Right Upper Lobe Diminished   Right Middle Lobe Diminished   Right Lower Lobe Diminished   Left Upper Lobe Diminished   Left Lower Lobe Diminished   Additional Respiratory  Assessments   Resp 20   Position Prone   Cuff Pressure (cm H2O) 30 cm H2O   Alarm Settings   High Pressure Alarm 55 cmH2O   Low Minute Volume Alarm 2 L/min   High Respiratory Rate 40 br/min   Low Exhaled Vt  200 mL   Patient Observation   Observations Ambu @ bedside.  ETT moved to left side   ETT (adult)   Placement Date/Time: 08/17/21 1216   Tube Size: 7.5 mm  Laryngoscope: GlideScope  Blade Size: 4  Location: Oral  Placement Verified By[de-identified] Colorimetric EtCO2 device  Secured at: 23 cm  Placed By: Licensed provider   Secured at 23 cm   Measured From Lips   ET Placement Left   Secured By Commercial tube stuart   Site Condition Dry

## 2021-08-25 NOTE — PROGRESS NOTES
Fentanyl, versed and nimbex discontinued per MD. 100mls of fentanyl, 150 mls of versed and 50mls of nimbex wasted with Vinod REYES. Witnessed waste of above medications w/ Carlos Saucedo RN.

## 2021-08-25 NOTE — PROGRESS NOTES
Patient has been persistently hypoxemic since about 1am in spite of manual bagging and vent adjustments to maximize oxygenation. She remains prone. Current settings:  PCV  20 / +20 / 22 / 100%  Ti = 2.2 sec for I:E = 3:1.    Patient's brother was called to notify him of patient's condition and to confirm DNR-CCA code status. He called back just now requesting withdrawal of care based on a consensus of all siblings and patient's significant other. Code status changed to [facility]. Nimbex infusion discontinued. Once it clears as evident by TOF testing extubation and cessation of pressor support would be appropriate.

## 2021-08-25 NOTE — PROGRESS NOTES
Patient assessed and noted with no pulse, no audible heart sounds, no resp effort and fixed pupils. 2nd RN in to verify. MD Mikal Meckel made aware and to the bedside to pronounce time of death at 5.

## 2021-08-25 NOTE — PROGRESS NOTES
Patient was repositioned and she became hypoxic in the 50's on the vent. She was bagged and would only come up to the 70's. Dr India Cam at bedside. Vent changes were made. Patient currently on vent SpO2 72%. Family notified.  Will continue to monitor

## 2021-08-25 NOTE — PROGRESS NOTES
Hospitalist Progress Note      PCP: No primary care provider on file. Date of Admission: 8/12/2021    Chief Complaint: [facility] Course: 57yoF who presented on 08/12 with n/v/diarrhea in addition to changes in mentation in the setting of recently being diagnosed with COVID-19 who was found to have sepsis physiology from progression of COVID-19. Hospital course c/b refractory hypoxemia in the setting of severe COVID-19 pneumonia and septic shock. Intubated 08/17. Due to ongoing respiartory deterioration and per pts wishes expressed via the family, the pt was transitioned to [facility] on 08/25 overnight. Subjective: unable to assess d/t clinical status.      24 hours events: transition to comfort care     Medications:  Reviewed    Infusion Medications    electrolyte-A 150 mL/hr at 08/24/21 2154    vasopressin (Septic Shock) infusion Stopped (08/24/21 1600)    dexmedetomidine HCl in NaCl Stopped (08/24/21 2314)    midazolam 150 mg/hr (08/25/21 0246)    dextrose      fentaNYL (SUBLIMAZE) infusion 150 mcg/hr (08/25/21 0502)    norepinephrine 30 mcg/min (08/25/21 0400)    sodium chloride       Scheduled Medications    enoxaparin  1 mg/kg Subcutaneous Daily    insulin lispro  0-18 Units Subcutaneous Q4H    senna  2 tablet Per NG tube BID    docusate  100 mg Per NG tube BID    piperacillin-tazobactam  3,375 mg IntraVENous Q8H    hydrocortisone sodium succinate PF  100 mg IntraVENous Q8H    pantoprazole  40 mg IntraVENous Daily    carboxymethylcellulose PF  1 drop Both Eyes Q4H    chlorhexidine  15 mL Mouth/Throat BID    PARoxetine  40 mg Oral Daily    sodium chloride flush  5-40 mL IntraVENous 2 times per day    Vitamin D  2,000 Units Oral Daily     PRN Meds: midazolam, glucose, dextrose, glucagon (rDNA), dextrose, guaiFENesin-dextromethorphan, sodium chloride flush, sodium chloride, ondansetron **OR** ondansetron, polyethylene glycol, acetaminophen **OR** acetaminophen      Intake/Output Summary (Last 24 hours) at 8/25/2021 0833  Last data filed at 8/24/2021 2200  Gross per 24 hour   Intake    Output 55 ml   Net -55 ml       Physical Exam Performed:    /61   Pulse 122   Temp 100.4 °F (38 °C) (Rectal)   Resp 20   Ht 5' (1.524 m)   Wt 185 lb 13.6 oz (84.3 kg)   LMP 04/15/2012   SpO2 (!) 80%   BMI 36.30 kg/m²     General appearance: pronated, appears comfortable though is medically paralyzed  Neck: intubated  Respiratory:  Intubated  Neurologic:  Paralyzed and sedated, RASS-4  Remainder of exam defferred d/t cc orders      Labs:   Recent Labs     08/23/21  0530   WBC 6.7   HGB 10.0*   HCT 29.4*        Recent Labs     08/23/21  0530 08/24/21  0530   * 132*   K 4.6 4.6    94*   CO2 23 22   BUN 48* 66*   CREATININE 0.7 2.1*   CALCIUM 8.1* 7.0*   PHOS 3.9 7.3*     No results for input(s): AST, ALT, BILIDIR, BILITOT, ALKPHOS in the last 72 hours. No results for input(s): INR in the last 72 hours. No results for input(s): [place] in the last 72 hours. Urinalysis:      Lab Results   Component Value Date    NITRU Negative 08/12/2021    WBCUA 10-20 08/12/2021    BACTERIA 1+ 08/12/2021    RBCUA 3-4 08/12/2021    BLOODU TRACE-INTACT 08/12/2021    SPECGRAV >=1.030 08/12/2021    GLUCOSEU Negative 08/12/2021       Radiology:  XR CHEST PORTABLE   Final Result   No change in the tubes and catheters. Stable cardiomegaly with worsening pulmonary edema. Increasing airspace opacity throughout both lungs which could be due to   pulmonary edema and/or worsening pneumonia      Questionable small bibasilar pleural effusions which are more prominent         XR ABDOMEN FOR NG/OG/NE TUBE PLACEMENT   Final Result   Status post placement of nasogastric tube with distal tip located in the   region the stomach as described above. XR CHEST PORTABLE   Final Result   1.  Status post placement of endotracheal tube with distal tip located approximately 3.5 cm above the elsi. 2. Status post placement of right internal jugular central line with distal   tip located near junction of superior vena cava and right atrium. No   evidence of pneumothorax. XR CHEST PORTABLE   Final Result   Diffuse bilateral airspace disease. Aeration is improved compared to prior   study. XR CHEST PORTABLE   Final Result   Significant worsening in appearance of bilateral pneumonia         CT Head WO Contrast   Final Result   No acute intracranial abnormality. CT ABDOMEN PELVIS W IV CONTRAST Additional Contrast? None   Final Result   1. Mucosal thickening and enhancement of the terminal ileum, proximal colon   and sigmoid colon/rectum. Pattern is nonspecific and suggests underlying   enterocolitis. Infectious or inflammatory etiologies may be considered. 2. No pattern of bowel obstruction. 3. Hepatic steatosis. 4. Airspace changes in the lower chest likely represent underlying viral   pneumonia. XR CHEST PORTABLE   Final Result   Perihilar opacities could represent COVID pneumonia given indication. Correlate with presentation to exclude superimposed congestive heart failure.                  Assessment/Plan:    Active Hospital Problems    Diagnosis     TE (acute kidney injury) ([facility] [address] [N17.9]     Hypertriglyceridemia [E78.1]     Lactic acidosis [E87.2]     Group B streptococcal infection [A49.1]     Bacteremia due to Staphylococcus aureus [R78.81, B95.61]     Septic shock (HCC) [A41.9, R65.21]     Elevated d-dimer [R79.89]     Pulmonary infiltrates [R91.8]     Acute respiratory failure with hypoxia (HCC) [J96.01]     Altered mental status [R41.82]     Mild intermittent asthma without complication [D22.41]     CINDY on CPAP [G47.33, Z99.89]     Elevated transaminase level [R74.01]     Hepatic steatosis [K76.0]     Obesity (BMI 35.0-39.9 without comorbidity) [E66.9]     2019 novel coronavirusinfected pneumonia ([facility]IP) [U07.1, J12.82]      60yoF who presented on 08/12 with n/v/diarrhea in addition to changes in mentation in the setting of recently being diagnosed with COVID-19 who was found to have sepsis physiology from progression of COVID-19. Hospital course c/b refractory hypoxemia in the setting of severe COVID-19 pneumonia s/p steroids, IL6 inhibitor now c/b septic shock. Intubated 08/17. Due to ongoing respiartory deterioration and per pts wishes expressed via the family, the pt was transitioned to [facility] on 08/25 overnight. Defer to ICU team for transitioning orders to comfort care only per their protocol. Severe Acute, COVID-19 Pneumonia w/ shock- p/w GI sxs and dehydration. Ongoing challenges with oxygenation. - appreciate pulm reccs  - appreciate ID reccs  - s/p solumedrol 40mg BID (08/13-08/22)  - s/p tociluzimab (08/16)  - DNR CC    Acute hypoxic respiratory failure ARDS- likely from COVID-19. PaO2/FiO2: 125. Previous echo 2013 w/ nrl EF.  - respiratory cultures- 3+ Gram positive cocci, appears to be MSSA. Continue pip-tazo, agree with ICU  - DNR CC    TE- worsening renal function with poor UOP. Agree with nephrology consult as pt may need CRRT.   - appreciate nephrology assistance  - DNR CC    N/v/abd pain/diarrhea- likely from covid, CTa/p done(suggestive of enterocolitis, no obstruction, noted hepatic steatosis)  -continue supportive care  - cdiff negative  -ua was abn and ucx > 50k mixed mike, no UTI at this time     Asthma- on home singulair     Anxiety-continued paxil       DVT Prophylaxis: enoxaparin BID  GI ppx: IV PPI  Diet: Diet NPO  Code Status: DNR-CC    PT/OT Eval Status: pending clinical course    Dispo - pending clinical course    Akshat Mahoney MD

## 2021-08-25 NOTE — PROGRESS NOTES
Patient brother Megan Julian called requesting withdrawal of care. 2 RN verified that his wishes are to turn of ventilator and implement comfort care measures. Family declined coming in. Dr. Magan Stanford notified.

## 2021-08-25 NOTE — PROGRESS NOTES
Sedation stopped d/t BIS reading <15. SpO2 was in the 80's.   Vent settings changed FiO2 100% Peep of 20

## 2021-08-25 NOTE — PROGRESS NOTES
Pulmonary & Critical Care Medicine ICU Progress Note      Reason for visit: Acute respiratory failure, severe COVID-19 pneumonia. Past history of asthma, anxiety, iron deficiency anemia. Events of Last 24 hours: Patient continues to be critically ill on mechanical vent support;patient continues to be on IV pressors to maintain hemoynamics patient continues to be hypoxemic and was on 100% oxygen and PEEP of 22 and SaO2 still on lower side  medications to maintain patient ventilator synchrony along with that patient now off  off neuromuscular blockade, , patient has had a T-max of 101.7 °F, patient has sinus tachycardia on the monitor,; patient did not have any significant urine output overnight with cumulative fluid balance of + 6.6 L, patient's blood sugars are not optimally controlled, no other pertinent review of system could be obtained      Invasive Lines:   CVC RIJ 8/17  Art Line 8/17        MV: 8/17/2021      MV Settings:  Vent Mode: AC/PC Rate Set: 20 bmp/Vt Ordered: 500 mL/ [email or web address])    IV:   electrolyte-A 150 mL/hr at 08/24/21 2154    vasopressin (Septic Shock) infusion Stopped (08/24/21 1600)    dexmedetomidine HCl in NaCl Stopped (08/24/21 2314)    midazolam 150 mg/hr (08/25/21 0246)    dextrose      fentaNYL (SUBLIMAZE) infusion 150 mcg/hr (08/25/21 0502)    norepinephrine 30 mcg/min (08/25/21 0400)    sodium chloride         Vitals:  BP 85/61   Pulse 118   Temp 101.7 °F (38.7 °C) (Bladder)   Resp 20   Ht 5' (1.524 m)   Wt 185 lb 13.6 oz (84.3 kg)   LMP 04/15/2012   SpO2 (!) 81%   BMI 36.30 kg/m²          In-person bedside physical examination deferred. Pursuant to the emergency declaration under the [address] [place] [address], [address] [place] [address] authority and the [employer] Act, this clinical encounter was conducted to provide necessary medical care.    (Also consistent with new provisions and guidance Range: 40.0 - 50.0 mmHg 38.4 (L) 106.6 (H) 98.3 (H) 45.1   pO2, Boby Latest Ref Range: 25 - 40 mmHg 50.6 (H) 66 61 205.5 (H)   HCO3, Venous Latest Ref Range: 23.0 - 29.0 mmol/L 24.7 22.1 (L) 25.7 21.2 (L)   TC02 (Calc), Boby Latest Ref Range: Not Established mmol/L 26 25 29 23   Base Excess, Boby Latest Ref Range: -3.0 - 3.0 mmol/L 0.5 -11 (L) -5 (L) -5.3 (L)   MetHgb, Boby Latest Ref Range: <1.5 % 0.3   0.6   O2 Sat, Boby Latest Ref Range: Not Established % 86 74 76 99     Results for Navneet Olivares (MRN [id number]) as of 8/25/2021 09:19   Ref. Range 8/24/2021 09:34 8/24/2021 11:15 8/24/2021 11:44 8/24/2021 16:31 8/24/2021 21:33 8/25/2021 00:10   Lactic Acid Latest Ref Range: 0.4 - 2.0 mmol/L  5.3 (HH)       POC Glucose Latest Ref Range: 70 - 99 mg/dl 238 (H)  222 (H) 200 (H) 141 (H) 137 (H)     Assessment and plan:  Acute respiratory failure, hypoxemic. Ventilatory support to keep saturation being 90-94% only  Patient had to be performed last night because of worsening hypoxemia  Ventilator settings and waveforms reviewed  Ventilator changes made and discussed with the respiratory therapist  IV sedation to maintain patient ventilator synchrony-propofol has been d/sandra   IV neuromuscular blockade along with IV sedation to continue for now   Nursing was requested to see if patient's Precedex infusion can be discontinued  Titration of sedation as per RASS score/BIS  Pulmonary toilet  Monitor input output and BMP  Correct electrolytes on whenever necessary basis  We will trend the inflammatory markers  Acute COVID-19 pneumonia. Presented with GI symptoms and confusion, those had resolved but her respiratory status has worsened. ID following, received Actemra.   On IV steroids  Staph aureus bacteremia along with Group b strep infection -patient was started on IV Zosyn-titration as per clinical status and c/s  TE-likely to be secondary to ATN -may require RRT -renal consult placed   Bicarb drip changed to plasmalyte infusion   Sepsis with septic shock and lactic acidosis-patient got IV fluids as per sepsis protocol and is on multiple per IV pressors to maintain hemodynamics  Abnormal LFT. Did have steatosis on CT, could be related to COVID-19 infection as well. Hyperglycemia. May need to be monitored and placed on SSI, better controlled  CINDY. On CPAP at home. Details not available. Address when she improves  Bronchial asthma. Mild intermittent as per the patient. Presently no wheezing. Bronchodilator on PRN basis   Elevated d-dimers. Lovenox Increased to 1 mg/kg sc Q12H-may need to change to IV heparin /Eliquis    Case discussed with ICU team/internal medicine/nephrology      Critical care time spent reviewing labs/films, examining patient, collaborating with other physicians but excluding procedures for life threatening organ failure is 35minutes . Multidisciplinary rounds were completed at the bedside with participation from the intensivist, pharmacy, nursing, nutrition.   All labs and imaging studies reviewed, discussed    Patient's clinical status has taken a turn for the worse and patient's overall clinical status is very precarious with possibility of nonsurvival-has been discussed with family-comfort care approach being contemplated       Electronically signed by:  Juan Odom MD    8/25/2021    9:16 AM.

## 2021-08-25 NOTE — PROGRESS NOTES
08/25/21 0806   Vent Information   Vent Type 980   Vent Mode AC/PC   Pressure Ordered 25   Rate Set 20 bmp   Pressure Support 0 cmH20   FiO2  100 %   SpO2 (!) 80 %   SpO2/FiO2 ratio 80   Sensitivity 3   PEEP/CPAP 22   I Time/ I Time % 2.2 s   Vent Patient Data   High Peep/I Pressure 25   Peak Inspiratory Pressure 47 cmH2O   Mean Airway Pressure 40 cmH20   Rate Measured 20 br/min   Vt Exhaled 397 mL   Minute Volume 7.9 Liters   I:E Ratio 2.80:1   Spontaneous Breathing Trial (SBT) RT Doc   Pulse 122   Additional Respiratory  Assessments   Resp 20   Alarm Settings   High Pressure Alarm 55 cmH2O   Low Minute Volume Alarm 2 L/min   High Respiratory Rate 40 br/min   Low Exhaled Vt  200 mL   Patient Observation   Observations Ambu @ bedside.  Pt in prone position   ETT (adult)   Placement Date/Time: 08/17/21 1216   Tube Size: 7.5 mm  Laryngoscope: GlideScope  Blade Size: 4  Location: Oral  Placement Verified By[[email or web address]] Colorimetric EtCO2 device  Secured at: 23 cm  Placed By: Licensed provider   Secured at 23 cm   Measured From Lips   ET Placement Left   Secured By Commercial tube stuart   Site Condition Dry   Cuff Pressure 30 cm H2O

## 2021-08-26 LAB — CULTURE, BLOOD 2: NORMAL

## 2021-08-26 NOTE — PROGRESS NOTES
Required reporting for death within 7days of removal of 2 point soft wrist restraints completed- added to facility database.